# Patient Record
Sex: MALE | Race: WHITE | Employment: OTHER | URBAN - METROPOLITAN AREA
[De-identification: names, ages, dates, MRNs, and addresses within clinical notes are randomized per-mention and may not be internally consistent; named-entity substitution may affect disease eponyms.]

---

## 2017-01-11 ENCOUNTER — OFFICE VISIT (OUTPATIENT)
Dept: CARDIOLOGY CLINIC | Age: 62
End: 2017-01-11

## 2017-01-11 VITALS
OXYGEN SATURATION: 96 % | DIASTOLIC BLOOD PRESSURE: 74 MMHG | BODY MASS INDEX: 35.44 KG/M2 | HEIGHT: 63 IN | WEIGHT: 200 LBS | HEART RATE: 48 BPM | SYSTOLIC BLOOD PRESSURE: 138 MMHG

## 2017-01-11 DIAGNOSIS — R00.1 BRADYCARDIA: ICD-10-CM

## 2017-01-11 DIAGNOSIS — E78.2 MIXED HYPERLIPIDEMIA: ICD-10-CM

## 2017-01-11 DIAGNOSIS — G47.33 OSA (OBSTRUCTIVE SLEEP APNEA): ICD-10-CM

## 2017-01-11 DIAGNOSIS — I10 ESSENTIAL HYPERTENSION: Primary | ICD-10-CM

## 2017-01-11 LAB
T4 FREE: 1.3 NG/DL (ref 0.9–1.8)
TSH REFLEX: 4.69 UIU/ML (ref 0.27–4.2)

## 2017-01-11 PROCEDURE — 93000 ELECTROCARDIOGRAM COMPLETE: CPT | Performed by: INTERNAL MEDICINE

## 2017-01-11 PROCEDURE — 99214 OFFICE O/P EST MOD 30 MIN: CPT | Performed by: INTERNAL MEDICINE

## 2017-03-01 ENCOUNTER — TELEPHONE (OUTPATIENT)
Dept: FAMILY MEDICINE CLINIC | Age: 62
End: 2017-03-01

## 2017-03-01 RX ORDER — CYCLOBENZAPRINE HCL 10 MG
10 TABLET ORAL 3 TIMES DAILY PRN
Qty: 30 TABLET | Refills: 0 | Status: SHIPPED | OUTPATIENT
Start: 2017-03-01 | End: 2017-07-27 | Stop reason: ALTCHOICE

## 2017-03-15 RX ORDER — SIMVASTATIN 40 MG
TABLET ORAL
Qty: 90 TABLET | Refills: 1 | Status: SHIPPED | OUTPATIENT
Start: 2017-03-15 | End: 2017-09-11 | Stop reason: SDUPTHER

## 2017-03-23 ENCOUNTER — OFFICE VISIT (OUTPATIENT)
Dept: FAMILY MEDICINE CLINIC | Age: 62
End: 2017-03-23

## 2017-03-23 VITALS
TEMPERATURE: 97.6 F | BODY MASS INDEX: 35.61 KG/M2 | DIASTOLIC BLOOD PRESSURE: 62 MMHG | HEIGHT: 63 IN | WEIGHT: 201 LBS | HEART RATE: 56 BPM | SYSTOLIC BLOOD PRESSURE: 136 MMHG

## 2017-03-23 DIAGNOSIS — E78.2 MIXED HYPERLIPIDEMIA: ICD-10-CM

## 2017-03-23 DIAGNOSIS — E03.9 ACQUIRED HYPOTHYROIDISM: ICD-10-CM

## 2017-03-23 DIAGNOSIS — R73.02 GLUCOSE INTOLERANCE (IMPAIRED GLUCOSE TOLERANCE): ICD-10-CM

## 2017-03-23 DIAGNOSIS — R73.02 GLUCOSE INTOLERANCE (IMPAIRED GLUCOSE TOLERANCE): Primary | ICD-10-CM

## 2017-03-23 LAB
ALT SERPL-CCNC: 28 U/L (ref 10–40)
ANION GAP SERPL CALCULATED.3IONS-SCNC: 9 MMOL/L (ref 3–16)
AST SERPL-CCNC: 16 U/L (ref 15–37)
BUN BLDV-MCNC: 31 MG/DL (ref 7–20)
CALCIUM SERPL-MCNC: 9 MG/DL (ref 8.3–10.6)
CHLORIDE BLD-SCNC: 106 MMOL/L (ref 99–110)
CHOLESTEROL, TOTAL: 143 MG/DL (ref 0–199)
CO2: 28 MMOL/L (ref 21–32)
CREAT SERPL-MCNC: 1.2 MG/DL (ref 0.8–1.3)
GFR AFRICAN AMERICAN: >60
GFR NON-AFRICAN AMERICAN: >60
GLUCOSE BLD-MCNC: 114 MG/DL (ref 70–99)
HDLC SERPL-MCNC: 49 MG/DL (ref 40–60)
LDL CHOLESTEROL CALCULATED: 78 MG/DL
POTASSIUM SERPL-SCNC: 5 MMOL/L (ref 3.5–5.1)
SODIUM BLD-SCNC: 143 MMOL/L (ref 136–145)
T4 FREE: 1.5 NG/DL (ref 0.9–1.8)
TRIGL SERPL-MCNC: 78 MG/DL (ref 0–150)
TSH SERPL DL<=0.05 MIU/L-ACNC: 1.66 UIU/ML (ref 0.27–4.2)
VLDLC SERPL CALC-MCNC: 16 MG/DL

## 2017-03-23 PROCEDURE — 99213 OFFICE O/P EST LOW 20 MIN: CPT | Performed by: FAMILY MEDICINE

## 2017-03-23 PROCEDURE — 1036F TOBACCO NON-USER: CPT | Performed by: FAMILY MEDICINE

## 2017-03-23 PROCEDURE — G8427 DOCREV CUR MEDS BY ELIG CLIN: HCPCS | Performed by: FAMILY MEDICINE

## 2017-03-23 PROCEDURE — G8417 CALC BMI ABV UP PARAM F/U: HCPCS | Performed by: FAMILY MEDICINE

## 2017-03-23 PROCEDURE — G8484 FLU IMMUNIZE NO ADMIN: HCPCS | Performed by: FAMILY MEDICINE

## 2017-03-23 PROCEDURE — 3017F COLORECTAL CA SCREEN DOC REV: CPT | Performed by: FAMILY MEDICINE

## 2017-03-23 ASSESSMENT — ENCOUNTER SYMPTOMS
NAUSEA: 0
ABDOMINAL DISTENTION: 0
CHEST TIGHTNESS: 0
CONSTIPATION: 0
VOMITING: 0
DIARRHEA: 0
ABDOMINAL PAIN: 0
SHORTNESS OF BREATH: 0
BLOOD IN STOOL: 0

## 2017-03-24 LAB
ESTIMATED AVERAGE GLUCOSE: 131.2 MG/DL
HBA1C MFR BLD: 6.2 %

## 2017-04-20 ENCOUNTER — OFFICE VISIT (OUTPATIENT)
Dept: FAMILY MEDICINE CLINIC | Age: 62
End: 2017-04-20

## 2017-04-20 VITALS
DIASTOLIC BLOOD PRESSURE: 84 MMHG | TEMPERATURE: 98.7 F | SYSTOLIC BLOOD PRESSURE: 138 MMHG | WEIGHT: 203 LBS | HEIGHT: 63 IN | BODY MASS INDEX: 35.97 KG/M2

## 2017-04-20 DIAGNOSIS — J02.9 SORE THROAT: ICD-10-CM

## 2017-04-20 DIAGNOSIS — H02.9 EYELID LESION: Primary | ICD-10-CM

## 2017-04-20 PROCEDURE — G8417 CALC BMI ABV UP PARAM F/U: HCPCS | Performed by: FAMILY MEDICINE

## 2017-04-20 PROCEDURE — 99213 OFFICE O/P EST LOW 20 MIN: CPT | Performed by: FAMILY MEDICINE

## 2017-04-20 PROCEDURE — G8427 DOCREV CUR MEDS BY ELIG CLIN: HCPCS | Performed by: FAMILY MEDICINE

## 2017-04-20 PROCEDURE — 3017F COLORECTAL CA SCREEN DOC REV: CPT | Performed by: FAMILY MEDICINE

## 2017-04-20 PROCEDURE — 1036F TOBACCO NON-USER: CPT | Performed by: FAMILY MEDICINE

## 2017-04-21 ENCOUNTER — TELEPHONE (OUTPATIENT)
Dept: FAMILY MEDICINE CLINIC | Age: 62
End: 2017-04-21

## 2017-04-21 RX ORDER — AZITHROMYCIN 250 MG/1
TABLET, FILM COATED ORAL
Qty: 1 PACKET | Refills: 0 | Status: SHIPPED | OUTPATIENT
Start: 2017-04-21 | End: 2017-05-01

## 2017-06-19 RX ORDER — LEVOTHYROXINE SODIUM 175 UG/1
TABLET ORAL
Qty: 90 TABLET | Refills: 1 | Status: SHIPPED | OUTPATIENT
Start: 2017-06-19 | End: 2017-12-16 | Stop reason: SDUPTHER

## 2017-07-12 ENCOUNTER — OFFICE VISIT (OUTPATIENT)
Dept: CARDIOLOGY CLINIC | Age: 62
End: 2017-07-12

## 2017-07-12 VITALS
HEIGHT: 63 IN | OXYGEN SATURATION: 96 % | DIASTOLIC BLOOD PRESSURE: 60 MMHG | WEIGHT: 203.2 LBS | BODY MASS INDEX: 36 KG/M2 | HEART RATE: 46 BPM | SYSTOLIC BLOOD PRESSURE: 128 MMHG

## 2017-07-12 DIAGNOSIS — G47.33 OSA (OBSTRUCTIVE SLEEP APNEA): ICD-10-CM

## 2017-07-12 DIAGNOSIS — R00.1 BRADYCARDIA: Primary | ICD-10-CM

## 2017-07-12 DIAGNOSIS — I10 ESSENTIAL HYPERTENSION: ICD-10-CM

## 2017-07-12 DIAGNOSIS — E78.2 MIXED HYPERLIPIDEMIA: ICD-10-CM

## 2017-07-12 PROCEDURE — 93000 ELECTROCARDIOGRAM COMPLETE: CPT | Performed by: INTERNAL MEDICINE

## 2017-07-12 PROCEDURE — 1036F TOBACCO NON-USER: CPT | Performed by: INTERNAL MEDICINE

## 2017-07-12 PROCEDURE — G8417 CALC BMI ABV UP PARAM F/U: HCPCS | Performed by: INTERNAL MEDICINE

## 2017-07-12 PROCEDURE — 99214 OFFICE O/P EST MOD 30 MIN: CPT | Performed by: INTERNAL MEDICINE

## 2017-07-12 PROCEDURE — G8427 DOCREV CUR MEDS BY ELIG CLIN: HCPCS | Performed by: INTERNAL MEDICINE

## 2017-07-12 PROCEDURE — 3017F COLORECTAL CA SCREEN DOC REV: CPT | Performed by: INTERNAL MEDICINE

## 2017-07-27 ENCOUNTER — OFFICE VISIT (OUTPATIENT)
Dept: FAMILY MEDICINE CLINIC | Age: 62
End: 2017-07-27

## 2017-07-27 VITALS
HEART RATE: 52 BPM | WEIGHT: 198.8 LBS | SYSTOLIC BLOOD PRESSURE: 122 MMHG | HEIGHT: 63 IN | DIASTOLIC BLOOD PRESSURE: 72 MMHG | BODY MASS INDEX: 35.22 KG/M2

## 2017-07-27 DIAGNOSIS — R73.02 GLUCOSE INTOLERANCE (IMPAIRED GLUCOSE TOLERANCE): ICD-10-CM

## 2017-07-27 DIAGNOSIS — I10 ESSENTIAL HYPERTENSION: ICD-10-CM

## 2017-07-27 DIAGNOSIS — K21.9 GASTROESOPHAGEAL REFLUX DISEASE, ESOPHAGITIS PRESENCE NOT SPECIFIED: ICD-10-CM

## 2017-07-27 DIAGNOSIS — Z23 NEED FOR TDAP VACCINATION: ICD-10-CM

## 2017-07-27 DIAGNOSIS — I10 ESSENTIAL HYPERTENSION: Primary | ICD-10-CM

## 2017-07-27 DIAGNOSIS — E03.9 ACQUIRED HYPOTHYROIDISM: ICD-10-CM

## 2017-07-27 LAB
ANION GAP SERPL CALCULATED.3IONS-SCNC: 13 MMOL/L (ref 3–16)
BUN BLDV-MCNC: 25 MG/DL (ref 7–20)
CALCIUM SERPL-MCNC: 9.8 MG/DL (ref 8.3–10.6)
CHLORIDE BLD-SCNC: 104 MMOL/L (ref 99–110)
CO2: 28 MMOL/L (ref 21–32)
CREAT SERPL-MCNC: 1.3 MG/DL (ref 0.8–1.3)
GFR AFRICAN AMERICAN: >60
GFR NON-AFRICAN AMERICAN: 56
GLUCOSE BLD-MCNC: 115 MG/DL (ref 70–99)
POTASSIUM SERPL-SCNC: 4.8 MMOL/L (ref 3.5–5.1)
SODIUM BLD-SCNC: 145 MMOL/L (ref 136–145)

## 2017-07-27 PROCEDURE — G8417 CALC BMI ABV UP PARAM F/U: HCPCS | Performed by: FAMILY MEDICINE

## 2017-07-27 PROCEDURE — G8427 DOCREV CUR MEDS BY ELIG CLIN: HCPCS | Performed by: FAMILY MEDICINE

## 2017-07-27 PROCEDURE — 3017F COLORECTAL CA SCREEN DOC REV: CPT | Performed by: FAMILY MEDICINE

## 2017-07-27 PROCEDURE — 99214 OFFICE O/P EST MOD 30 MIN: CPT | Performed by: FAMILY MEDICINE

## 2017-07-27 PROCEDURE — 90715 TDAP VACCINE 7 YRS/> IM: CPT | Performed by: FAMILY MEDICINE

## 2017-07-27 PROCEDURE — 90471 IMMUNIZATION ADMIN: CPT | Performed by: FAMILY MEDICINE

## 2017-07-27 PROCEDURE — 1036F TOBACCO NON-USER: CPT | Performed by: FAMILY MEDICINE

## 2017-07-27 ASSESSMENT — ENCOUNTER SYMPTOMS
ABDOMINAL DISTENTION: 0
CONSTIPATION: 0
NAUSEA: 0
COUGH: 0
ABDOMINAL PAIN: 0
BLOOD IN STOOL: 0
DIARRHEA: 0
VOMITING: 0
CHEST TIGHTNESS: 0
SHORTNESS OF BREATH: 0

## 2017-07-28 LAB
ESTIMATED AVERAGE GLUCOSE: 134.1 MG/DL
HBA1C MFR BLD: 6.3 %

## 2017-08-16 ENCOUNTER — HOSPITAL ENCOUNTER (OUTPATIENT)
Dept: OTHER | Age: 62
Discharge: HOME | End: 2017-08-16
Attending: SURGERY | Admitting: SURGERY

## 2017-08-16 LAB — PROSTATE SPECIFIC ANTIGEN: 1.62 NG/ML (ref 0–4)

## 2017-09-11 RX ORDER — SIMVASTATIN 40 MG
TABLET ORAL
Qty: 90 TABLET | Refills: 1 | Status: SHIPPED | OUTPATIENT
Start: 2017-09-11 | End: 2018-03-10 | Stop reason: SDUPTHER

## 2017-09-29 ENCOUNTER — OFFICE VISIT (OUTPATIENT)
Dept: FAMILY MEDICINE CLINIC | Age: 62
End: 2017-09-29

## 2017-09-29 ENCOUNTER — HOSPITAL ENCOUNTER (OUTPATIENT)
Dept: NON INVASIVE DIAGNOSTICS | Age: 62
Discharge: HOME | End: 2017-09-29
Attending: FAMILY MEDICINE | Admitting: FAMILY MEDICINE

## 2017-09-29 VITALS
SYSTOLIC BLOOD PRESSURE: 126 MMHG | TEMPERATURE: 99.2 F | HEIGHT: 63 IN | BODY MASS INDEX: 35.37 KG/M2 | WEIGHT: 199.6 LBS | DIASTOLIC BLOOD PRESSURE: 82 MMHG

## 2017-09-29 DIAGNOSIS — J02.9 SORE THROAT: ICD-10-CM

## 2017-09-29 DIAGNOSIS — R05.9 COUGH: ICD-10-CM

## 2017-09-29 DIAGNOSIS — J02.9 SORE THROAT: Primary | ICD-10-CM

## 2017-09-29 PROCEDURE — 99213 OFFICE O/P EST LOW 20 MIN: CPT | Performed by: FAMILY MEDICINE

## 2017-09-29 PROCEDURE — G8427 DOCREV CUR MEDS BY ELIG CLIN: HCPCS | Performed by: FAMILY MEDICINE

## 2017-09-29 PROCEDURE — 3017F COLORECTAL CA SCREEN DOC REV: CPT | Performed by: FAMILY MEDICINE

## 2017-09-29 PROCEDURE — 1036F TOBACCO NON-USER: CPT | Performed by: FAMILY MEDICINE

## 2017-09-29 PROCEDURE — G8417 CALC BMI ABV UP PARAM F/U: HCPCS | Performed by: FAMILY MEDICINE

## 2017-09-29 RX ORDER — DOXYCYCLINE HYCLATE 100 MG/1
100 CAPSULE ORAL 2 TIMES DAILY
Qty: 20 CAPSULE | Refills: 0 | Status: SHIPPED | OUTPATIENT
Start: 2017-09-29 | End: 2017-10-09

## 2017-12-07 ENCOUNTER — OFFICE VISIT (OUTPATIENT)
Dept: FAMILY MEDICINE CLINIC | Age: 62
End: 2017-12-07

## 2017-12-07 VITALS
HEART RATE: 72 BPM | TEMPERATURE: 98.2 F | SYSTOLIC BLOOD PRESSURE: 134 MMHG | WEIGHT: 200.2 LBS | BODY MASS INDEX: 35.47 KG/M2 | DIASTOLIC BLOOD PRESSURE: 70 MMHG | HEIGHT: 63 IN

## 2017-12-07 DIAGNOSIS — E78.2 MIXED HYPERLIPIDEMIA: ICD-10-CM

## 2017-12-07 DIAGNOSIS — E03.9 ACQUIRED HYPOTHYROIDISM: ICD-10-CM

## 2017-12-07 DIAGNOSIS — R73.02 GLUCOSE INTOLERANCE (IMPAIRED GLUCOSE TOLERANCE): Primary | ICD-10-CM

## 2017-12-07 DIAGNOSIS — I10 ESSENTIAL HYPERTENSION: ICD-10-CM

## 2017-12-07 PROCEDURE — 3017F COLORECTAL CA SCREEN DOC REV: CPT | Performed by: FAMILY MEDICINE

## 2017-12-07 PROCEDURE — 1036F TOBACCO NON-USER: CPT | Performed by: FAMILY MEDICINE

## 2017-12-07 PROCEDURE — G8484 FLU IMMUNIZE NO ADMIN: HCPCS | Performed by: FAMILY MEDICINE

## 2017-12-07 PROCEDURE — 99214 OFFICE O/P EST MOD 30 MIN: CPT | Performed by: FAMILY MEDICINE

## 2017-12-07 PROCEDURE — G8427 DOCREV CUR MEDS BY ELIG CLIN: HCPCS | Performed by: FAMILY MEDICINE

## 2017-12-07 PROCEDURE — G8417 CALC BMI ABV UP PARAM F/U: HCPCS | Performed by: FAMILY MEDICINE

## 2017-12-07 ASSESSMENT — ENCOUNTER SYMPTOMS
BLOOD IN STOOL: 0
ABDOMINAL PAIN: 0
DIARRHEA: 0
VOMITING: 0
CONSTIPATION: 0
NAUSEA: 0
CHEST TIGHTNESS: 0
COUGH: 0
ABDOMINAL DISTENTION: 0
SHORTNESS OF BREATH: 0

## 2017-12-07 NOTE — PROGRESS NOTES
Constitutional: Negative for appetite change, chills, fever and unexpected weight change. Respiratory: Negative for cough, chest tightness and shortness of breath. Cardiovascular: Negative for chest pain, palpitations and leg swelling. Gastrointestinal: Negative for abdominal distention, abdominal pain, blood in stool, constipation, diarrhea, nausea and vomiting. No gerd no dysphagia   Endocrine: Negative for polydipsia and polyuria. Genitourinary: Negative for difficulty urinating and hematuria. Neurological: Negative for dizziness and headaches. Objective:   Physical Exam   Constitutional: He appears well-developed and well-nourished. No distress. HENT:   Head: Normocephalic and atraumatic. Mouth/Throat: Oropharynx is clear and moist and mucous membranes are normal.   Eyes: No scleral icterus. Neck: Full passive range of motion without pain. Neck supple. No thyroid mass and no thyromegaly present. Cardiovascular: Normal rate, regular rhythm and normal heart sounds. Exam reveals no gallop and no friction rub. No murmur heard. No edema   Pulmonary/Chest: Effort normal and breath sounds normal. No accessory muscle usage. No tachypnea. No respiratory distress. He has no decreased breath sounds. He has no wheezes. He has no rhonchi. He has no rales. Abdominal: Soft. Normal appearance and bowel sounds are normal. He exhibits no distension, no abdominal bruit, no pulsatile midline mass and no mass. There is no hepatosplenomegaly. There is no tenderness. There is no rigidity and no guarding. Lymphadenopathy:     He has no cervical adenopathy. Right: No supraclavicular adenopathy present. Left: No supraclavicular adenopathy present. Neurological: He is alert. He displays no tremor. Skin: Skin is warm, dry and intact. He is not diaphoretic. No cyanosis. No pallor. Nails show no clubbing. Psychiatric: He has a normal mood and affect.  His speech is normal. Assessment:      1. Glucose intolerance (impaired glucose tolerance)  Hemoglobin A1C    Comprehensive Metabolic Panel   2. Acquired hypothyroidism  TSH without Reflex    T4, Free   3. Essential hypertension  Lipid Panel    Comprehensive Metabolic Panel   4.  Mixed hyperlipidemia  Lipid Panel    Comprehensive Metabolic Panel     Stable and doing well      Plan:      Continue to stay with the low fat diet  Call for any problem  See us back in 6 months

## 2017-12-12 LAB
ALBUMIN SERPL-MCNC: 4.3 G/DL (ref 3.5–5.7)
ALP BLD-CCNC: 49 U/L (ref 36–125)
ALT SERPL-CCNC: 25 U/L (ref 7–52)
ANION GAP SERPL CALCULATED.3IONS-SCNC: 6 MMOL/L (ref 3–16)
AST SERPL-CCNC: 16 U/L (ref 13–39)
BILIRUB SERPL-MCNC: 0.6 MG/DL (ref 0–1.5)
BUN BLDV-MCNC: 23 MG/DL (ref 7–25)
CALCIUM SERPL-MCNC: 9.5 MG/DL (ref 8.6–10.3)
CHLORIDE BLD-SCNC: 108 MMOL/L (ref 98–110)
CHOLESTEROL, TOTAL: 149 MG/DL (ref 0–200)
CO2: 28 MMOL/L (ref 21–33)
CREAT SERPL-MCNC: 1.14 MG/DL (ref 0.6–1.3)
GFR, ESTIMATED: 69 SEE NOTE.
GFR, ESTIMATED: 79 SEE NOTE.
GLUCOSE BLD-MCNC: 120 MG/DL (ref 70–100)
HBA1C MFR BLD: 6.4 % (ref 4.8–6.4)
HDLC SERPL-MCNC: 61 MG/DL (ref 60–92)
LDL CHOLESTEROL CALCULATED: 72 MG/DL
OSMOLALITY CALCULATION: 299 MOSM/KG (ref 278–305)
POTASSIUM SERPL-SCNC: 4.4 MMOL/L (ref 3.5–5.3)
SODIUM BLD-SCNC: 142 MMOL/L (ref 133–146)
T4 FREE: 1.02 NG/DL (ref 0.61–1.76)
TOTAL PROTEIN: 6.8 G/DL (ref 6.4–8.9)
TRIGL SERPL-MCNC: 79 MG/DL (ref 10–149)
TSH, 3RD GENERATION: 2.31 UIU/ML (ref 0.34–5.6)

## 2017-12-14 ENCOUNTER — TELEPHONE (OUTPATIENT)
Dept: FAMILY MEDICINE CLINIC | Age: 62
End: 2017-12-14

## 2017-12-18 RX ORDER — LEVOTHYROXINE SODIUM 175 UG/1
TABLET ORAL
Qty: 90 TABLET | Refills: 1 | Status: SHIPPED | OUTPATIENT
Start: 2017-12-18 | End: 2018-04-30 | Stop reason: SDUPTHER

## 2018-01-30 ENCOUNTER — OFFICE VISIT (OUTPATIENT)
Dept: FAMILY MEDICINE CLINIC | Age: 63
End: 2018-01-30

## 2018-01-30 VITALS
DIASTOLIC BLOOD PRESSURE: 84 MMHG | HEIGHT: 63 IN | WEIGHT: 198.8 LBS | BODY MASS INDEX: 35.22 KG/M2 | TEMPERATURE: 98.4 F | SYSTOLIC BLOOD PRESSURE: 124 MMHG

## 2018-01-30 DIAGNOSIS — J06.9 UPPER RESPIRATORY TRACT INFECTION, UNSPECIFIED TYPE: Primary | ICD-10-CM

## 2018-01-30 PROCEDURE — 99213 OFFICE O/P EST LOW 20 MIN: CPT | Performed by: FAMILY MEDICINE

## 2018-01-30 PROCEDURE — 3017F COLORECTAL CA SCREEN DOC REV: CPT | Performed by: FAMILY MEDICINE

## 2018-01-30 PROCEDURE — G8417 CALC BMI ABV UP PARAM F/U: HCPCS | Performed by: FAMILY MEDICINE

## 2018-01-30 PROCEDURE — G8427 DOCREV CUR MEDS BY ELIG CLIN: HCPCS | Performed by: FAMILY MEDICINE

## 2018-01-30 PROCEDURE — G8484 FLU IMMUNIZE NO ADMIN: HCPCS | Performed by: FAMILY MEDICINE

## 2018-01-30 PROCEDURE — 1036F TOBACCO NON-USER: CPT | Performed by: FAMILY MEDICINE

## 2018-01-30 RX ORDER — DOXYCYCLINE HYCLATE 100 MG/1
100 CAPSULE ORAL 2 TIMES DAILY
Qty: 20 CAPSULE | Refills: 0 | Status: SHIPPED | OUTPATIENT
Start: 2018-01-30 | End: 2018-02-09

## 2018-01-30 NOTE — PROGRESS NOTES
Subjective:      Patient ID: Ashley Nunez is a 58 y.o. male. Patient presents with:  Congestion: cough, ears clogged x 2 days    Cough productive  Ears clogged no pain  No fever  No sore throat  No congestion head  No tobacco  Wife also ill     height is 5' 3\" (1.6 m) and weight is 198 lb 12.8 oz (90.2 kg). His oral temperature is 98.4 °F (36.9 °C). His blood pressure is 124/84. Review of Systems    Objective:   Physical Exam   Constitutional: He appears well-developed and well-nourished. No distress. HENT:   Head: Normocephalic and atraumatic. Right Ear: Tympanic membrane and ear canal normal.   Left Ear: Tympanic membrane and ear canal normal.   Nose: Nose normal.   Mouth/Throat: Uvula is midline, oropharynx is clear and moist and mucous membranes are normal. No oral lesions. Neck: Neck supple. Pulmonary/Chest: Effort normal and breath sounds normal. No accessory muscle usage. No tachypnea. No respiratory distress. He has no decreased breath sounds. He has no wheezes. He has no rhonchi. He has no rales. Few ronchi in the posterior chest and went away after a few deep breaths   Lymphadenopathy:        Head (right side): No submental and no submandibular adenopathy present. Head (left side): No submental and no submandibular adenopathy present. He has no cervical adenopathy. Right: No supraclavicular adenopathy present. Left: No supraclavicular adenopathy present. Neurological: He is alert. Skin: Skin is warm, dry and intact. He is not diaphoretic. No pallor. Assessment:      1.  Upper respiratory tract infection, unspecified type             Plan:      Do take the antibiotic  Take fluids  Use robitussin dm for the cough      Orders Placed This Encounter   Medications    doxycycline hyclate (VIBRAMYCIN) 100 MG capsule     Sig: Take 1 capsule by mouth 2 times daily for 10 days     Dispense:  20 capsule     Refill:  0

## 2018-03-12 RX ORDER — SIMVASTATIN 40 MG
TABLET ORAL
Qty: 90 TABLET | Refills: 2 | Status: SHIPPED | OUTPATIENT
Start: 2018-03-12 | End: 2018-03-22 | Stop reason: SDUPTHER

## 2018-03-14 ENCOUNTER — OFFICE VISIT (OUTPATIENT)
Dept: CARDIOLOGY CLINIC | Age: 63
End: 2018-03-14

## 2018-03-14 VITALS
SYSTOLIC BLOOD PRESSURE: 124 MMHG | OXYGEN SATURATION: 97 % | BODY MASS INDEX: 34.73 KG/M2 | WEIGHT: 196 LBS | DIASTOLIC BLOOD PRESSURE: 66 MMHG | HEART RATE: 50 BPM | HEIGHT: 63 IN

## 2018-03-14 DIAGNOSIS — R00.1 BRADYCARDIA: ICD-10-CM

## 2018-03-14 DIAGNOSIS — I10 ESSENTIAL HYPERTENSION: Primary | ICD-10-CM

## 2018-03-14 DIAGNOSIS — E78.2 MIXED HYPERLIPIDEMIA: ICD-10-CM

## 2018-03-14 DIAGNOSIS — G47.33 OSA (OBSTRUCTIVE SLEEP APNEA): ICD-10-CM

## 2018-03-14 PROCEDURE — G8417 CALC BMI ABV UP PARAM F/U: HCPCS | Performed by: INTERNAL MEDICINE

## 2018-03-14 PROCEDURE — G8427 DOCREV CUR MEDS BY ELIG CLIN: HCPCS | Performed by: INTERNAL MEDICINE

## 2018-03-14 PROCEDURE — 93000 ELECTROCARDIOGRAM COMPLETE: CPT | Performed by: INTERNAL MEDICINE

## 2018-03-14 PROCEDURE — G8484 FLU IMMUNIZE NO ADMIN: HCPCS | Performed by: INTERNAL MEDICINE

## 2018-03-14 PROCEDURE — 1036F TOBACCO NON-USER: CPT | Performed by: INTERNAL MEDICINE

## 2018-03-14 PROCEDURE — 99214 OFFICE O/P EST MOD 30 MIN: CPT | Performed by: INTERNAL MEDICINE

## 2018-03-14 PROCEDURE — 3017F COLORECTAL CA SCREEN DOC REV: CPT | Performed by: INTERNAL MEDICINE

## 2018-03-14 NOTE — PATIENT INSTRUCTIONS
Patient Education        Heart-Healthy Diet: Care Instructions  Your Care Instructions    A heart-healthy diet has lots of vegetables, fruits, nuts, beans, and whole grains, and is low in salt. It limits foods that are high in saturated fat, such as meats, cheeses, and fried foods. It may be hard to change your diet, but even small changes can lower your risk of heart attack and heart disease. Follow-up care is a key part of your treatment and safety. Be sure to make and go to all appointments, and call your doctor if you are having problems. It's also a good idea to know your test results and keep a list of the medicines you take. How can you care for yourself at home? Watch your portions  · Learn what a serving is. A \"serving\" and a \"portion\" are not always the same thing. Make sure that you are not eating larger portions than are recommended. For example, a serving of pasta is ½ cup. A serving size of meat is 2 to 3 ounces. A 3-ounce serving is about the size of a deck of cards. Measure serving sizes until you are good at [place]" them. Keep in mind that restaurants often serve portions that are 2 or 3 times the size of one serving. · To keep your energy level up and keep you from feeling hungry, eat often but in smaller portions. · Eat only the number of calories you need to stay at a healthy weight. If you need to lose weight, eat fewer calories than your body burns (through exercise and other physical activity). Eat more fruits and vegetables  · Eat a variety of fruit and vegetables every day. Dark green, deep orange, red, or yellow fruits and vegetables are especially good for you. Examples include spinach, carrots, peaches, and berries. · Keep carrots, celery, and other veggies handy for snacks. Buy fruit that is in season and store it where you can see it so that you will be tempted to eat it. · Cook dishes that have a lot of veggies in them, such as stir-fries and soups.   Limit saturated and

## 2018-03-14 NOTE — LETTER
[facility] HEART [address] [place] [address]  Phone: [phone]  Fax: [phone]    Mi Hogan MD        March 14, 2018     Trish Martinez MD  [address] [place], [place] [id number]    Patient: Tho Storey  MR Number: [id number]  YOB: 1955  Date of Visit: 3/14/2018    Dear Dr. Trish Martinez:            [address]      Cardiology [facility] up    Tho Storey  1955    March 14, 2018    Cc: \"I have no chest pain\"     HPI:  The patient is 58 y.o. male with a past medical history significant for HTN, HLD & sleep apena. He is here for an annual check up for his hypertension. He is feeling good and is working on his home doing remodeling without any symptoms. He denies CP, pressure, tightness, edema, SOB, heart racing, palpitations, lightheaded, dizziness, PND or orthopnea. He walks his dog daily. He also reports that he no longer wears his CPAP.        Past Medical History:   Diagnosis Date    Anxiety     BIPOLAR DISORDER     HYPERCHOLESTERAEMIA     Hypothyroid     Kidney disease     OCD (obsessive compulsive disorder)     PTSD     Screening PSA (prostate specific antigen) 1.3.11    result - 2.91    Sleep apnea      Past Surgical History:   Procedure Laterality Date    COLONOSCOPY  4.27.05    normal, Dr. Donna Goff COLONOSCOPY  5/16/14    normal, rhoades    EXTERNAL AUDITORY CANAL RECONSTRUCTION      left 1980 and 1995    LYMPH NODE BIOPSY  4/27/2010    removed groin, benign, dr Cranford Bernheim  5/16/14    Erica moore, check in one year    UPPER GASTROINTESTINAL ENDOSCOPY  6/1/15    Erica Schrader, check one year     Family History   Problem Relation Age of Onset    Heart Disease Mother     Heart Disease Father     Lung Cancer Sister     Diabetes Maternal Uncle     Diabetes Maternal Grandmother     Arthritis Other      Social History Substance Use Topics    Smoking status: Never Smoker    Smokeless tobacco: Never Used    Alcohol use No       Allergies   Allergen Reactions    Codeine      Current Outpatient Prescriptions   Medication Sig Dispense Refill    simvastatin (ZOCOR) 40 MG tablet TAKE 1 TABLET NIGHTLY 90 tablet 2    levothyroxine (SYNTHROID) 175 MCG tablet TAKE 1 TABLET DAILY 90 tablet 1    pantoprazole (PROTONIX) 20 MG tablet Take 20 mg by mouth daily      ONE TOUCH ULTRA TEST strip TEST ONCE DAILY 30 strip 6    ONETOUCH DELICA LANCETS MISC USE AS DIRECTED 4 each 6    busPIRone (BUSPAR) 10 MG tablet Take 1 tablet by mouth 2 times daily. (Patient taking differently: 20 mg 2 times daily ) 60 tablet 4    desvenlafaxine (PRISTIQ) 50 MG TB24 Take 50 mg by mouth daily.  fluvoxamine (LUVOX) 50 MG tablet Take 50 mg by mouth nightly        No current facility-administered medications for this visit. Review of Systems:  Review of systems is as detailed above and all other systems are normal.     Physical Exam:   /66   Pulse 50   Ht 5' 3\" (1.6 m)   Wt 196 lb (88.9 kg) Comment: without shoes  SpO2 97%   BMI 34.72 kg/m²    Wt Readings from Last 3 Encounters:   03/14/18 196 lb (88.9 kg)   01/30/18 198 lb 12.8 oz (90.2 kg)   12/07/17 200 lb 3.2 oz (90.8 kg)     Constitutional: He is oriented to person, place, and time. He appears well-developed and well-nourished. In no acute distress. Head: Normocephalic and atraumatic. Pupils equal and round. Neck: Neck supple. No JVP or carotid bruit appreciated. No mass and no thyromegaly present. No lymphadenopathy present. Cardiovascular: Bradycardia. Normal heart sounds. Exam reveals no gallop and no friction rub. No murmur heard. Pulmonary/Chest: Effort normal and breath sounds normal. No respiratory distress. He has no wheezes, rhonchi or rales. Abdominal: Soft, non-tender. Bowel sounds are normal. He exhibits no organomegaly, mass or bruit. Extremities: No edema, cyanosis, or clubbing. Pulses are 2+ radial/dorsalis pedis/posterior tibial/carotid bilaterally. Neurological: No gross cranial nerve deficit. Coordination normal.   Skin: Skin is warm and dry. There is no rash or diaphoresis. Psychiatric: He has a normal mood and affect. His speech is normal and behavior is normal.     Lab Review:   FLP:    Lab Results   Component Value Date    TRIG 79 12/12/2017    HDL 61 12/12/2017    HDL 54 01/31/2012    LDLCALC 72 12/12/2017    LABVLDL 16 03/23/2017     BUN/Creatinine:    Lab Results   Component Value Date    BUN 23 12/12/2017    CREATININE 1.14 12/12/2017     EKG Interpretation: 7/12/17, reviewed showed s bardycardia. 3/14/18, Marked sinus  Bradycardia  -First degree A-V block-HR 47       Image Review:     ECHO: 4/2010  LVEF 65%    Assessment/Plan:     Hypertension  BP is stable today. BMP from 12/2017 stable.      Hyperlipidemia  Last lipid profile from 12/2017 was within acceptable limits. Continue Zocor.       Bradycardia  Pt continues to have marked bradycardia on clinical exam. His EKG shows sinus bradycardia with HR 47 He continues to remain asymptomatic. This is very likely due to high vagal tone. I will continue to follow him clinically. TSH from 3/2017 WNL. BRIDGETTE  Pt is no longer using CPAP machine and refuses to wear it. Patient to follow up in 1 year. Thank you very much for allowing me to participate in the care of your patient. Please do not hesitate to contact me if you have any questions. Sincerely,  Sherrell Yee MD      [address] [place] [address]  Ph: [phone]  Fax: [phone]        If you have questions, please do not hesitate to call me. I look forward to following Kathy Ann along with you.     Sincerely,        Sherrell Yee MD

## 2018-03-22 RX ORDER — SIMVASTATIN 40 MG
40 TABLET ORAL NIGHTLY
Qty: 90 TABLET | Refills: 2 | Status: SHIPPED | OUTPATIENT
Start: 2018-03-22 | End: 2019-03-03 | Stop reason: SDUPTHER

## 2018-04-23 ENCOUNTER — OFFICE VISIT (OUTPATIENT)
Dept: FAMILY MEDICINE CLINIC | Age: 63
End: 2018-04-23

## 2018-04-23 VITALS
HEIGHT: 63 IN | WEIGHT: 203 LBS | HEART RATE: 56 BPM | TEMPERATURE: 98.3 F | SYSTOLIC BLOOD PRESSURE: 134 MMHG | BODY MASS INDEX: 35.97 KG/M2 | DIASTOLIC BLOOD PRESSURE: 82 MMHG

## 2018-04-23 DIAGNOSIS — I10 ESSENTIAL HYPERTENSION: Primary | ICD-10-CM

## 2018-04-23 DIAGNOSIS — R73.02 GLUCOSE INTOLERANCE (IMPAIRED GLUCOSE TOLERANCE): ICD-10-CM

## 2018-04-23 DIAGNOSIS — K21.9 GASTROESOPHAGEAL REFLUX DISEASE, ESOPHAGITIS PRESENCE NOT SPECIFIED: ICD-10-CM

## 2018-04-23 DIAGNOSIS — I10 ESSENTIAL HYPERTENSION: ICD-10-CM

## 2018-04-23 LAB
ANION GAP SERPL CALCULATED.3IONS-SCNC: 14 MMOL/L (ref 3–16)
BILIRUBIN URINE: NEGATIVE
BLOOD, URINE: NEGATIVE
BUN BLDV-MCNC: 22 MG/DL (ref 7–20)
CALCIUM SERPL-MCNC: 9.6 MG/DL (ref 8.3–10.6)
CHLORIDE BLD-SCNC: 104 MMOL/L (ref 99–110)
CLARITY: CLEAR
CO2: 29 MMOL/L (ref 21–32)
COLOR: YELLOW
CREAT SERPL-MCNC: 1.2 MG/DL (ref 0.8–1.3)
EPITHELIAL CELLS, UA: 0 /HPF (ref 0–5)
GFR AFRICAN AMERICAN: >60
GFR NON-AFRICAN AMERICAN: >60
GLUCOSE BLD-MCNC: 113 MG/DL (ref 70–99)
GLUCOSE URINE: NEGATIVE MG/DL
HYALINE CASTS: 1 /LPF (ref 0–8)
KETONES, URINE: NEGATIVE MG/DL
LEUKOCYTE ESTERASE, URINE: NEGATIVE
MICROSCOPIC EXAMINATION: YES
NITRITE, URINE: NEGATIVE
PH UA: 6
POTASSIUM SERPL-SCNC: 4.6 MMOL/L (ref 3.5–5.1)
PROTEIN UA: ABNORMAL MG/DL
RBC UA: 2 /HPF (ref 0–4)
SODIUM BLD-SCNC: 147 MMOL/L (ref 136–145)
SPECIFIC GRAVITY UA: 1.02
UROBILINOGEN, URINE: 0.2 E.U./DL
WBC UA: 3 /HPF (ref 0–5)

## 2018-04-23 PROCEDURE — 1036F TOBACCO NON-USER: CPT | Performed by: FAMILY MEDICINE

## 2018-04-23 PROCEDURE — G8417 CALC BMI ABV UP PARAM F/U: HCPCS | Performed by: FAMILY MEDICINE

## 2018-04-23 PROCEDURE — 99213 OFFICE O/P EST LOW 20 MIN: CPT | Performed by: FAMILY MEDICINE

## 2018-04-23 PROCEDURE — G8427 DOCREV CUR MEDS BY ELIG CLIN: HCPCS | Performed by: FAMILY MEDICINE

## 2018-04-23 PROCEDURE — 3017F COLORECTAL CA SCREEN DOC REV: CPT | Performed by: FAMILY MEDICINE

## 2018-04-23 ASSESSMENT — ENCOUNTER SYMPTOMS
NAUSEA: 0
CHEST TIGHTNESS: 0
SHORTNESS OF BREATH: 0
BLOOD IN STOOL: 0
ABDOMINAL PAIN: 0
CONSTIPATION: 0
DIARRHEA: 0
ABDOMINAL DISTENTION: 0
VOMITING: 0

## 2018-04-23 ASSESSMENT — PATIENT HEALTH QUESTIONNAIRE - PHQ9
SUM OF ALL RESPONSES TO PHQ9 QUESTIONS 1 & 2: 0
1. LITTLE INTEREST OR PLEASURE IN DOING THINGS: 0
SUM OF ALL RESPONSES TO PHQ QUESTIONS 1-9: 0
2. FEELING DOWN, DEPRESSED OR HOPELESS: 0

## 2018-04-24 ENCOUNTER — TELEPHONE (OUTPATIENT)
Dept: FAMILY MEDICINE CLINIC | Age: 63
End: 2018-04-24

## 2018-04-24 LAB
ESTIMATED AVERAGE GLUCOSE: 134.1 MG/DL
HBA1C MFR BLD: 6.3 %

## 2018-04-30 RX ORDER — LEVOTHYROXINE SODIUM 175 UG/1
TABLET ORAL
Qty: 90 TABLET | Refills: 1 | Status: SHIPPED | OUTPATIENT
Start: 2018-04-30 | End: 2018-12-06 | Stop reason: SDUPTHER

## 2018-08-09 ENCOUNTER — OFFICE VISIT (OUTPATIENT)
Dept: FAMILY MEDICINE CLINIC | Age: 63
End: 2018-08-09

## 2018-08-09 VITALS
DIASTOLIC BLOOD PRESSURE: 78 MMHG | WEIGHT: 203.4 LBS | HEART RATE: 68 BPM | BODY MASS INDEX: 36.04 KG/M2 | TEMPERATURE: 97.8 F | HEIGHT: 63 IN | SYSTOLIC BLOOD PRESSURE: 128 MMHG

## 2018-08-09 DIAGNOSIS — R73.02 GLUCOSE INTOLERANCE (IMPAIRED GLUCOSE TOLERANCE): ICD-10-CM

## 2018-08-09 DIAGNOSIS — E03.9 ACQUIRED HYPOTHYROIDISM: ICD-10-CM

## 2018-08-09 DIAGNOSIS — I10 ESSENTIAL HYPERTENSION: Primary | ICD-10-CM

## 2018-08-09 DIAGNOSIS — I10 ESSENTIAL HYPERTENSION: ICD-10-CM

## 2018-08-09 LAB
A/G RATIO: 1.9 (ref 1.1–2.2)
ALBUMIN SERPL-MCNC: 4.5 G/DL (ref 3.4–5)
ALP BLD-CCNC: 53 U/L (ref 40–129)
ALT SERPL-CCNC: 25 U/L (ref 10–40)
ANION GAP SERPL CALCULATED.3IONS-SCNC: 13 MMOL/L (ref 3–16)
AST SERPL-CCNC: 18 U/L (ref 15–37)
BILIRUB SERPL-MCNC: 0.6 MG/DL (ref 0–1)
BUN BLDV-MCNC: 22 MG/DL (ref 7–20)
CALCIUM SERPL-MCNC: 9.3 MG/DL (ref 8.3–10.6)
CHLORIDE BLD-SCNC: 103 MMOL/L (ref 99–110)
CHOLESTEROL, TOTAL: 147 MG/DL (ref 0–199)
CO2: 27 MMOL/L (ref 21–32)
CREAT SERPL-MCNC: 1.3 MG/DL (ref 0.8–1.3)
GFR AFRICAN AMERICAN: >60
GFR NON-AFRICAN AMERICAN: 56
GLOBULIN: 2.4 G/DL
GLUCOSE BLD-MCNC: 113 MG/DL (ref 70–99)
HDLC SERPL-MCNC: 59 MG/DL (ref 40–60)
LDL CHOLESTEROL CALCULATED: 71 MG/DL
POTASSIUM SERPL-SCNC: 4.5 MMOL/L (ref 3.5–5.1)
SODIUM BLD-SCNC: 143 MMOL/L (ref 136–145)
T4 FREE: 1.7 NG/DL (ref 0.9–1.8)
TOTAL PROTEIN: 6.9 G/DL (ref 6.4–8.2)
TRIGL SERPL-MCNC: 84 MG/DL (ref 0–150)
TSH SERPL DL<=0.05 MIU/L-ACNC: 3.99 UIU/ML (ref 0.27–4.2)
VLDLC SERPL CALC-MCNC: 17 MG/DL

## 2018-08-09 PROCEDURE — 1036F TOBACCO NON-USER: CPT | Performed by: FAMILY MEDICINE

## 2018-08-09 PROCEDURE — 99213 OFFICE O/P EST LOW 20 MIN: CPT | Performed by: FAMILY MEDICINE

## 2018-08-09 PROCEDURE — G8417 CALC BMI ABV UP PARAM F/U: HCPCS | Performed by: FAMILY MEDICINE

## 2018-08-09 PROCEDURE — G8427 DOCREV CUR MEDS BY ELIG CLIN: HCPCS | Performed by: FAMILY MEDICINE

## 2018-08-09 PROCEDURE — 3017F COLORECTAL CA SCREEN DOC REV: CPT | Performed by: FAMILY MEDICINE

## 2018-08-10 LAB
ESTIMATED AVERAGE GLUCOSE: 139.9 MG/DL
HBA1C MFR BLD: 6.5 %

## 2018-09-27 ENCOUNTER — HOSPITAL ENCOUNTER (OUTPATIENT)
Age: 63
Discharge: HOME | End: 2018-09-27
Payer: MEDICARE

## 2018-09-27 LAB
ANION GAP SERPL CALCULATED.3IONS-SCNC: 12 MMOL/L (ref 3–16)
BUN BLDV-MCNC: 27 MG/DL (ref 7–20)
CALCIUM SERPL-MCNC: 9.5 MG/DL (ref 8.3–10.6)
CHLORIDE BLD-SCNC: 103 MMOL/L (ref 99–110)
CO2: 28 MMOL/L (ref 21–32)
CREAT SERPL-MCNC: 1.5 MG/DL (ref 0.8–1.3)
GFR AFRICAN AMERICAN: 57
GFR NON-AFRICAN AMERICAN: 47
GLUCOSE BLD-MCNC: 113 MG/DL (ref 70–99)
POTASSIUM SERPL-SCNC: 5 MMOL/L (ref 3.5–5.1)
SODIUM BLD-SCNC: 143 MMOL/L (ref 136–145)

## 2018-09-27 PROCEDURE — 84156 ASSAY OF PROTEIN URINE: CPT

## 2018-09-27 PROCEDURE — 82570 ASSAY OF URINE CREATININE: CPT

## 2018-09-27 PROCEDURE — 80048 BASIC METABOLIC PNL TOTAL CA: CPT

## 2018-09-27 PROCEDURE — 36415 COLL VENOUS BLD VENIPUNCTURE: CPT

## 2018-09-28 LAB
CREATININE URINE: 161.7 MG/DL (ref 39–259)
PROTEIN PROTEIN: 8 MG/DL
PROTEIN/CREAT RATIO: 0 MG/DL

## 2018-12-07 RX ORDER — LEVOTHYROXINE SODIUM 175 UG/1
TABLET ORAL
Qty: 90 TABLET | Refills: 2 | Status: SHIPPED | OUTPATIENT
Start: 2018-12-07 | End: 2019-09-29 | Stop reason: SDUPTHER

## 2019-01-18 ENCOUNTER — OFFICE VISIT (OUTPATIENT)
Dept: FAMILY MEDICINE CLINIC | Age: 64
End: 2019-01-18
Payer: MEDICARE

## 2019-01-18 VITALS
WEIGHT: 203 LBS | TEMPERATURE: 97.7 F | SYSTOLIC BLOOD PRESSURE: 136 MMHG | HEIGHT: 63 IN | DIASTOLIC BLOOD PRESSURE: 80 MMHG | BODY MASS INDEX: 35.97 KG/M2 | HEART RATE: 56 BPM

## 2019-01-18 DIAGNOSIS — E03.9 ACQUIRED HYPOTHYROIDISM: ICD-10-CM

## 2019-01-18 DIAGNOSIS — J06.9 UPPER RESPIRATORY TRACT INFECTION, UNSPECIFIED TYPE: Primary | ICD-10-CM

## 2019-01-18 DIAGNOSIS — E11.9 TYPE 2 DIABETES MELLITUS WITHOUT COMPLICATION, WITHOUT LONG-TERM CURRENT USE OF INSULIN (HCC): ICD-10-CM

## 2019-01-18 LAB
A/G RATIO: 1.2 (ref 1.1–2.2)
ALBUMIN SERPL-MCNC: 4.1 G/DL (ref 3.4–5)
ALP BLD-CCNC: 64 U/L (ref 40–129)
ALT SERPL-CCNC: 24 U/L (ref 10–40)
ANION GAP SERPL CALCULATED.3IONS-SCNC: 12 MMOL/L (ref 3–16)
AST SERPL-CCNC: 19 U/L (ref 15–37)
BILIRUB SERPL-MCNC: 0.4 MG/DL (ref 0–1)
BUN BLDV-MCNC: 25 MG/DL (ref 7–20)
CALCIUM SERPL-MCNC: 9.5 MG/DL (ref 8.3–10.6)
CHLORIDE BLD-SCNC: 107 MMOL/L (ref 99–110)
CO2: 24 MMOL/L (ref 21–32)
CREAT SERPL-MCNC: 1.2 MG/DL (ref 0.8–1.3)
GFR AFRICAN AMERICAN: >60
GFR NON-AFRICAN AMERICAN: >60
GLOBULIN: 3.3 G/DL
GLUCOSE BLD-MCNC: 133 MG/DL (ref 70–99)
POTASSIUM SERPL-SCNC: 4.9 MMOL/L (ref 3.5–5.1)
SODIUM BLD-SCNC: 143 MMOL/L (ref 136–145)
T4 FREE: 1.6 NG/DL (ref 0.9–1.8)
TOTAL PROTEIN: 7.4 G/DL (ref 6.4–8.2)
TSH SERPL DL<=0.05 MIU/L-ACNC: 0.72 UIU/ML (ref 0.27–4.2)

## 2019-01-18 PROCEDURE — 3017F COLORECTAL CA SCREEN DOC REV: CPT | Performed by: FAMILY MEDICINE

## 2019-01-18 PROCEDURE — 1036F TOBACCO NON-USER: CPT | Performed by: FAMILY MEDICINE

## 2019-01-18 PROCEDURE — 3046F HEMOGLOBIN A1C LEVEL >9.0%: CPT | Performed by: FAMILY MEDICINE

## 2019-01-18 PROCEDURE — G8427 DOCREV CUR MEDS BY ELIG CLIN: HCPCS | Performed by: FAMILY MEDICINE

## 2019-01-18 PROCEDURE — G8484 FLU IMMUNIZE NO ADMIN: HCPCS | Performed by: FAMILY MEDICINE

## 2019-01-18 PROCEDURE — 99213 OFFICE O/P EST LOW 20 MIN: CPT | Performed by: FAMILY MEDICINE

## 2019-01-18 PROCEDURE — G8417 CALC BMI ABV UP PARAM F/U: HCPCS | Performed by: FAMILY MEDICINE

## 2019-01-18 PROCEDURE — 2022F DILAT RTA XM EVC RTNOPTHY: CPT | Performed by: FAMILY MEDICINE

## 2019-01-18 RX ORDER — FLUVOXAMINE MALEATE 100 MG
TABLET ORAL NIGHTLY
COMMUNITY
Start: 2018-11-22

## 2019-01-18 RX ORDER — CEFUROXIME AXETIL 250 MG/1
250 TABLET ORAL 2 TIMES DAILY
Qty: 20 TABLET | Refills: 0 | Status: SHIPPED | OUTPATIENT
Start: 2019-01-18 | End: 2019-01-28

## 2019-01-18 ASSESSMENT — ENCOUNTER SYMPTOMS
CONSTIPATION: 0
VOMITING: 0
ABDOMINAL DISTENTION: 0
DIARRHEA: 0
SHORTNESS OF BREATH: 0
CHEST TIGHTNESS: 0
ABDOMINAL PAIN: 0
NAUSEA: 0
BLOOD IN STOOL: 0

## 2019-01-19 LAB
ESTIMATED AVERAGE GLUCOSE: 139.9 MG/DL
HBA1C MFR BLD: 6.5 %

## 2019-02-07 ENCOUNTER — OFFICE VISIT (OUTPATIENT)
Dept: FAMILY MEDICINE CLINIC | Age: 64
End: 2019-02-07
Payer: MEDICARE

## 2019-02-07 VITALS
TEMPERATURE: 98.3 F | HEIGHT: 63 IN | HEART RATE: 52 BPM | SYSTOLIC BLOOD PRESSURE: 122 MMHG | WEIGHT: 202 LBS | DIASTOLIC BLOOD PRESSURE: 80 MMHG | BODY MASS INDEX: 35.79 KG/M2

## 2019-02-07 DIAGNOSIS — E11.9 TYPE 2 DIABETES MELLITUS WITHOUT COMPLICATION, WITHOUT LONG-TERM CURRENT USE OF INSULIN (HCC): ICD-10-CM

## 2019-02-07 DIAGNOSIS — K21.9 GASTROESOPHAGEAL REFLUX DISEASE, ESOPHAGITIS PRESENCE NOT SPECIFIED: ICD-10-CM

## 2019-02-07 DIAGNOSIS — E03.9 ACQUIRED HYPOTHYROIDISM: Primary | ICD-10-CM

## 2019-02-07 PROCEDURE — 1036F TOBACCO NON-USER: CPT | Performed by: FAMILY MEDICINE

## 2019-02-07 PROCEDURE — G8484 FLU IMMUNIZE NO ADMIN: HCPCS | Performed by: FAMILY MEDICINE

## 2019-02-07 PROCEDURE — 3017F COLORECTAL CA SCREEN DOC REV: CPT | Performed by: FAMILY MEDICINE

## 2019-02-07 PROCEDURE — 2022F DILAT RTA XM EVC RTNOPTHY: CPT | Performed by: FAMILY MEDICINE

## 2019-02-07 PROCEDURE — G8427 DOCREV CUR MEDS BY ELIG CLIN: HCPCS | Performed by: FAMILY MEDICINE

## 2019-02-07 PROCEDURE — G8417 CALC BMI ABV UP PARAM F/U: HCPCS | Performed by: FAMILY MEDICINE

## 2019-02-07 PROCEDURE — 3044F HG A1C LEVEL LT 7.0%: CPT | Performed by: FAMILY MEDICINE

## 2019-02-07 PROCEDURE — 99214 OFFICE O/P EST MOD 30 MIN: CPT | Performed by: FAMILY MEDICINE

## 2019-02-07 ASSESSMENT — ENCOUNTER SYMPTOMS
ABDOMINAL PAIN: 0
DIARRHEA: 0
CHEST TIGHTNESS: 0
CONSTIPATION: 0
VOMITING: 0
SHORTNESS OF BREATH: 0
NAUSEA: 0
ABDOMINAL DISTENTION: 0
BLOOD IN STOOL: 0

## 2019-03-04 RX ORDER — SIMVASTATIN 40 MG
40 TABLET ORAL NIGHTLY
Qty: 90 TABLET | Refills: 2 | Status: SHIPPED | OUTPATIENT
Start: 2019-03-04 | End: 2019-09-29 | Stop reason: SDUPTHER

## 2019-05-24 NOTE — PROGRESS NOTES
Was here with wife and noted a few weeks of irritated area on the abdomen. Though it was bug bite but not healing  No fever  Picks at it  Small barely felt red papule  About 8 mm   No foreign body  No d/c  He will use the bactroban   caLL IF NO BETTER   he is seeing dr Sweeney No soon and will check then    Orders Placed This Encounter   Medications    mupirocin (BACTROBAN) 2 % ointment     Sig: Apply topically 3 times daily for 5 days Apply 3 times daily.      Dispense:  1 Tube     Refill:  0

## 2019-06-06 ENCOUNTER — OFFICE VISIT (OUTPATIENT)
Dept: FAMILY MEDICINE CLINIC | Age: 64
End: 2019-06-06
Payer: MEDICARE

## 2019-06-06 VITALS
SYSTOLIC BLOOD PRESSURE: 134 MMHG | WEIGHT: 195 LBS | TEMPERATURE: 97.8 F | HEIGHT: 63 IN | HEART RATE: 52 BPM | BODY MASS INDEX: 34.55 KG/M2 | DIASTOLIC BLOOD PRESSURE: 70 MMHG

## 2019-06-06 DIAGNOSIS — I10 ESSENTIAL HYPERTENSION: ICD-10-CM

## 2019-06-06 DIAGNOSIS — H72.92 PERFORATION OF LEFT TYMPANIC MEMBRANE: ICD-10-CM

## 2019-06-06 DIAGNOSIS — E11.9 TYPE 2 DIABETES MELLITUS WITHOUT COMPLICATION, WITHOUT LONG-TERM CURRENT USE OF INSULIN (HCC): ICD-10-CM

## 2019-06-06 DIAGNOSIS — E11.9 TYPE 2 DIABETES MELLITUS WITHOUT COMPLICATION, WITHOUT LONG-TERM CURRENT USE OF INSULIN (HCC): Primary | ICD-10-CM

## 2019-06-06 LAB
A/G RATIO: 1.2 (ref 1.1–2.2)
ALBUMIN SERPL-MCNC: 4.2 G/DL (ref 3.4–5)
ALP BLD-CCNC: 70 U/L (ref 40–129)
ALT SERPL-CCNC: 37 U/L (ref 10–40)
ANION GAP SERPL CALCULATED.3IONS-SCNC: 13 MMOL/L (ref 3–16)
AST SERPL-CCNC: 22 U/L (ref 15–37)
BILIRUB SERPL-MCNC: 0.3 MG/DL (ref 0–1)
BUN BLDV-MCNC: 31 MG/DL (ref 7–20)
CALCIUM SERPL-MCNC: 10.1 MG/DL (ref 8.3–10.6)
CHLORIDE BLD-SCNC: 105 MMOL/L (ref 99–110)
CHOLESTEROL, TOTAL: 163 MG/DL (ref 0–199)
CO2: 25 MMOL/L (ref 21–32)
CREAT SERPL-MCNC: 1.3 MG/DL (ref 0.8–1.3)
GFR AFRICAN AMERICAN: >60
GFR NON-AFRICAN AMERICAN: 56
GLOBULIN: 3.4 G/DL
GLUCOSE BLD-MCNC: 108 MG/DL (ref 70–99)
HDLC SERPL-MCNC: 59 MG/DL (ref 40–60)
LDL CHOLESTEROL CALCULATED: 85 MG/DL
POTASSIUM SERPL-SCNC: 5.6 MMOL/L (ref 3.5–5.1)
SODIUM BLD-SCNC: 143 MMOL/L (ref 136–145)
TOTAL PROTEIN: 7.6 G/DL (ref 6.4–8.2)
TRIGL SERPL-MCNC: 95 MG/DL (ref 0–150)
VLDLC SERPL CALC-MCNC: 19 MG/DL

## 2019-06-06 PROCEDURE — 2022F DILAT RTA XM EVC RTNOPTHY: CPT | Performed by: FAMILY MEDICINE

## 2019-06-06 PROCEDURE — G8417 CALC BMI ABV UP PARAM F/U: HCPCS | Performed by: FAMILY MEDICINE

## 2019-06-06 PROCEDURE — 1036F TOBACCO NON-USER: CPT | Performed by: FAMILY MEDICINE

## 2019-06-06 PROCEDURE — 3017F COLORECTAL CA SCREEN DOC REV: CPT | Performed by: FAMILY MEDICINE

## 2019-06-06 PROCEDURE — 3044F HG A1C LEVEL LT 7.0%: CPT | Performed by: FAMILY MEDICINE

## 2019-06-06 PROCEDURE — 99213 OFFICE O/P EST LOW 20 MIN: CPT | Performed by: FAMILY MEDICINE

## 2019-06-06 PROCEDURE — G8427 DOCREV CUR MEDS BY ELIG CLIN: HCPCS | Performed by: FAMILY MEDICINE

## 2019-06-06 ASSESSMENT — ENCOUNTER SYMPTOMS
NAUSEA: 0
BLOOD IN STOOL: 0
DIARRHEA: 0
ABDOMINAL PAIN: 0
CHEST TIGHTNESS: 0
CONSTIPATION: 0
VOMITING: 0
SHORTNESS OF BREATH: 0
ABDOMINAL DISTENTION: 0

## 2019-06-06 NOTE — PROGRESS NOTES
Subjective:      Patient ID: Lukasz Berger is a 59 y.o. male. Patient presents with: Follow-up: thyroid, hypertension, lipids     He is doing well and no c/o  Last few times he has checked the glucose he was at about 111    He is trying to watch the diet carefully. He is to start the cpap  Feet ok  eye check in October    YOB: 1955    Date of Visit:  6/6/2019     -- Codeine     Current Outpatient Medications:  simvastatin (ZOCOR) 40 MG tablet, TAKE 1 TABLET BY MOUTH  NIGHTLY, Disp: 90 tablet, Rfl: 2  fluvoxaMINE (LUVOX) 100 MG tablet, , Disp: , Rfl:   levothyroxine (SYNTHROID) 175 MCG tablet, TAKE 1 TABLET DAILY, Disp: 90 tablet, Rfl: 2  pantoprazole (PROTONIX) 20 MG tablet, Take 20 mg by mouth daily, Disp: , Rfl:   ONE TOUCH ULTRA TEST strip, TEST ONCE DAILY, Disp: 30 strip, Rfl: 6  ONETOUCH DELICA LANCETS MISC, USE AS DIRECTED, Disp: 4 each, Rfl: 6  busPIRone (BUSPAR) 10 MG tablet, Take 1 tablet by mouth 2 times daily. (Patient taking differently: 20 mg 2 times daily ), Disp: 60 tablet, Rfl: 4  desvenlafaxine (PRISTIQ) 50 MG TB24, Take 50 mg by mouth daily. , Disp: , Rfl:     No current facility-administered medications for this visit.       ---------------------------               06/06/19                      1355         ---------------------------   BP:          134/70         Site:    Left Upper Arm     Position:     Sitting        Cuff Size:   Large Adult      Pulse:         52           Temp:   97.8 °F (36.6 °C)   TempSrc:      Oral          Weight: 195 lb (88.5 kg)    Height:   5' 3\" (1.6 m)    ---------------------------  Body mass index is 34.54 kg/m².      Wt Readings from Last 3 Encounters:  06/06/19 : 195 lb (88.5 kg)  02/07/19 : 202 lb (91.6 kg)  01/18/19 : 203 lb (92.1 kg)    BP Readings from Last 3 Encounters:  06/06/19 : 134/70  02/07/19 : 122/80  01/18/19 : 136/80                    Review of Systems   Constitutional: Negative for

## 2019-06-07 DIAGNOSIS — E87.5 SERUM POTASSIUM ELEVATED: ICD-10-CM

## 2019-06-07 DIAGNOSIS — R79.9 ABNORMAL BLOOD CHEMISTRY: Primary | ICD-10-CM

## 2019-06-07 LAB
ESTIMATED AVERAGE GLUCOSE: 139.9 MG/DL
HBA1C MFR BLD: 6.5 %

## 2019-06-10 ENCOUNTER — OFFICE VISIT (OUTPATIENT)
Dept: ENT CLINIC | Age: 64
End: 2019-06-10
Payer: MEDICARE

## 2019-06-10 VITALS
WEIGHT: 196.4 LBS | HEART RATE: 40 BPM | BODY MASS INDEX: 34.8 KG/M2 | TEMPERATURE: 98.8 F | SYSTOLIC BLOOD PRESSURE: 108 MMHG | HEIGHT: 63 IN | DIASTOLIC BLOOD PRESSURE: 66 MMHG

## 2019-06-10 DIAGNOSIS — H74.02 TYMPANOSCLEROSIS INVOLVING TYMPANIC MEMBRANE ONLY, LEFT: Primary | ICD-10-CM

## 2019-06-10 DIAGNOSIS — H72.92 MONOMERIC TYMPANIC MEMBRANE OF LEFT EAR: ICD-10-CM

## 2019-06-10 PROCEDURE — 3017F COLORECTAL CA SCREEN DOC REV: CPT | Performed by: OTOLARYNGOLOGY

## 2019-06-10 PROCEDURE — 1036F TOBACCO NON-USER: CPT | Performed by: OTOLARYNGOLOGY

## 2019-06-10 PROCEDURE — G8427 DOCREV CUR MEDS BY ELIG CLIN: HCPCS | Performed by: OTOLARYNGOLOGY

## 2019-06-10 PROCEDURE — G8417 CALC BMI ABV UP PARAM F/U: HCPCS | Performed by: OTOLARYNGOLOGY

## 2019-06-10 PROCEDURE — 99203 OFFICE O/P NEW LOW 30 MIN: CPT | Performed by: OTOLARYNGOLOGY

## 2019-06-10 NOTE — PROGRESS NOTES
CHIEF COMPLAINT: Hearing loss left ear    HISTORY OF PRESENT ILLNESS:  59 y.o. male who presents with hearing loss left ear. Long standing. . No otorrhea. No otalgia. Has history of noise exposure at work but wore ear protection. Has had 4 procedures on his left middle ear. Has concern about a perforation of the left tympanic membrane. PAST MEDICAL HISTORY:   Social History     Tobacco Use   Smoking Status Never Smoker   Smokeless Tobacco Never Used                                                    Social History     Substance and Sexual Activity   Alcohol Use No    Alcohol/week: 0.0 oz                                                    Current Outpatient Medications:     simvastatin (ZOCOR) 40 MG tablet, TAKE 1 TABLET BY MOUTH  NIGHTLY, Disp: 90 tablet, Rfl: 2    fluvoxaMINE (LUVOX) 100 MG tablet, , Disp: , Rfl:     levothyroxine (SYNTHROID) 175 MCG tablet, TAKE 1 TABLET DAILY, Disp: 90 tablet, Rfl: 2    pantoprazole (PROTONIX) 20 MG tablet, Take 20 mg by mouth daily, Disp: , Rfl:     ONE TOUCH ULTRA TEST strip, TEST ONCE DAILY, Disp: 30 strip, Rfl: 6    ONETOUCH DELICA LANCETS MISC, USE AS DIRECTED, Disp: 4 each, Rfl: 6    busPIRone (BUSPAR) 10 MG tablet, Take 1 tablet by mouth 2 times daily. (Patient taking differently: 20 mg 2 times daily ), Disp: 60 tablet, Rfl: 4    desvenlafaxine (PRISTIQ) 50 MG TB24, Take 50 mg by mouth daily.   , Disp: , Rfl:                                                  Past Medical History:   Diagnosis Date    Allergic rhinitis     Anxiety     Arthritis     Bipolar Disorder     Diabetes mellitus ([address]     GERD (gastroesophageal reflux disease)     Hearing loss     HYPERCHOLESTERAEMIA     Hypertension     Hypothyroid     Kidney disease     OCD (obsessive compulsive disorder)     PTSD     Screening PSA (prostate specific antigen) 1.3.11    result - 2.91    Sleep apnea                                                     Past Surgical History:   Procedure Laterality Date    COLONOSCOPY  4.27.05    normal, Dr. Emeka Phoenix COLONOSCOPY  5/16/14    normal, Yumiko Franklin EXTERNAL AUDITORY CANAL RECONSTRUCTION      left 1980 and 1995    LYMPH NODE BIOPSY  4/27/2010    removed groin, benign, dr Zenaida Cordero  5/16/14    Jose moore, check in one year    UPPER GASTROINTESTINAL ENDOSCOPY  6/1/15    rhoades, check one year         REVIEW OF SYSTEMS:  All pertinent positive and negative review of systems included in HPI. Otherwise, all systems are reviewed and negative. PHYSICAL EXAMINATION:   GENERAL: wdwn- no acute distress  COMMUNICATION :  Normal voice  MENTAL STATUS:  Normal  HEAD AND FACE:  Normal  EXTERNAL EARS AND NOSE:  Normal  FACIAL MUSCLES:  Normal  EXTRAOCULAR MUSCLES: Intact  FACE PALPATION:  Negative  OTOSCOPY: Right ear normal.  Left tympanic membrane has some anterior tympanosclerosis as well as an area of monomeric membrane. The middle ear space is well aerated. TUNING FORKS: Rinne ++ Oliveira midline at 512 Hz  INTRANASAL:  Septum midline, turbinates normal, meati clear. LIPS, TEETH, GINGIVA:  Normal mucosa  PHARYNX:  Normal  NECK:  No masses or adenopathy  SALIVARY GLANDS:  Normal  THYROID:  Normal    IMPRESSION: Status post multiple procedures on his left tympanic membrane with a monomeric membrane and some tympanosclerosis. I see no perforation. PLAN: Patient advised that he does not have a perforation. He is not interested in audiometry at this time. FOLLOW-UP: As needed.

## 2019-06-24 ENCOUNTER — HOSPITAL ENCOUNTER (OUTPATIENT)
Age: 64
Discharge: HOME | End: 2019-06-24
Payer: MEDICARE

## 2019-06-24 DIAGNOSIS — E87.5 SERUM POTASSIUM ELEVATED: ICD-10-CM

## 2019-06-24 DIAGNOSIS — R79.9 ABNORMAL BLOOD CHEMISTRY: ICD-10-CM

## 2019-06-24 LAB
ANION GAP SERPL CALCULATED.3IONS-SCNC: 11 MMOL/L (ref 3–16)
BUN BLDV-MCNC: 24 MG/DL (ref 7–20)
CALCIUM SERPL-MCNC: 9.6 MG/DL (ref 8.3–10.6)
CHLORIDE BLD-SCNC: 104 MMOL/L (ref 99–110)
CO2: 28 MMOL/L (ref 21–32)
CREAT SERPL-MCNC: 1.2 MG/DL (ref 0.8–1.3)
GFR AFRICAN AMERICAN: >60
GFR NON-AFRICAN AMERICAN: >60
GLUCOSE BLD-MCNC: 122 MG/DL (ref 70–99)
POTASSIUM SERPL-SCNC: 5 MMOL/L (ref 3.5–5.1)
SODIUM BLD-SCNC: 143 MMOL/L (ref 136–145)

## 2019-06-24 PROCEDURE — 36415 COLL VENOUS BLD VENIPUNCTURE: CPT

## 2019-06-24 PROCEDURE — 80048 BASIC METABOLIC PNL TOTAL CA: CPT

## 2019-09-20 NOTE — PROGRESS NOTES
"""S/P IOL OU: OD: Tecnis ZCB00 26.0FemtoOmidria. OS: Tecnis ZCB00 25 (ORA) +ORA/Arcs. " [address]      Cardiology [facility] up    Bruna Kurtz  1955    March 14, 2018    Cc: \"I have no chest pain\"     HPI:  The patient is 58 y.o. male with a past medical history significant for HTN, HLD & sleep apena. He is here for an annual check up for his hypertension. He is feeling good and is working on his home doing remodeling without any symptoms. He denies CP, pressure, tightness, edema, SOB, heart racing, palpitations, lightheaded, dizziness, PND or orthopnea. He walks his dog daily. He also reports that he no longer wears his CPAP.        Past Medical History:   Diagnosis Date    Anxiety     BIPOLAR DISORDER     HYPERCHOLESTERAEMIA     Hypothyroid     Kidney disease     OCD (obsessive compulsive disorder)     PTSD     Screening PSA (prostate specific antigen) 1.3.11    result - 2.91    Sleep apnea      Past Surgical History:   Procedure Laterality Date    COLONOSCOPY  4.27.05    normal, Dr. Merida Standing COLONOSCOPY  5/16/14    normal, rhoades    EXTERNAL AUDITORY CANAL RECONSTRUCTION      left 1980 and 1995    LYMPH NODE BIOPSY  4/27/2010    removed groin, benign, dr Jose Tran ENDOSCOPY  5/16/14    Toma moore, check in one year    UPPER GASTROINTESTINAL ENDOSCOPY  6/1/15    Toma Carreno, check one year     Family History   Problem Relation Age of Onset    Heart Disease Mother     Heart Disease Father     Lung Cancer Sister     Diabetes Maternal Uncle     Diabetes Maternal Grandmother     Arthritis Other      Social History   Substance Use Topics    Smoking status: Never Smoker    Smokeless tobacco: Never Used    Alcohol use No       Allergies   Allergen Reactions    Codeine      Current Outpatient Prescriptions   Medication Sig Dispense Refill    simvastatin (ZOCOR) 40 MG tablet TAKE 1 TABLET NIGHTLY 90 tablet 2    levothyroxine (SYNTHROID) 175 MCG tablet TAKE 1 TABLET DAILY 90 tablet 1    Date    TRIG 79 12/12/2017    HDL 61 12/12/2017    HDL 54 01/31/2012    LDLCALC 72 12/12/2017    LABVLDL 16 03/23/2017     BUN/Creatinine:    Lab Results   Component Value Date    BUN 23 12/12/2017    CREATININE 1.14 12/12/2017     EKG Interpretation: 7/12/17, reviewed showed s bardycardia. 3/14/18, Marked sinus  Bradycardia  -First degree A-V block-HR 47       Image Review:     ECHO: 4/2010  LVEF 65%    Assessment/Plan:     Hypertension  BP is stable today. BMP from 12/2017 stable.      Hyperlipidemia  Last lipid profile from 12/2017 was within acceptable limits. Continue Zocor.       Bradycardia  Pt continues to have marked bradycardia on clinical exam. His EKG shows sinus bradycardia with HR 47 He continues to remain asymptomatic. This is very likely due to high vagal tone. I will continue to follow him clinically. TSH from 3/2017 WNL. BRIDGETTE  Pt is no longer using CPAP machine and refuses to wear it. Patient to follow up in 1 year. Thank you very much for allowing me to participate in the care of your patient. Please do not hesitate to contact me if you have any questions.     Sincerely,  MD DAGOBERTO [address] [place] [address], [address] [place] [address] Hugh Gonzalez [facility]  Ph: [phone]  Fax: [phone]

## 2019-09-30 RX ORDER — SIMVASTATIN 40 MG
40 TABLET ORAL NIGHTLY
Qty: 90 TABLET | Refills: 2 | Status: SHIPPED | OUTPATIENT
Start: 2019-09-30 | End: 2020-05-11 | Stop reason: SDUPTHER

## 2019-09-30 RX ORDER — LEVOTHYROXINE SODIUM 175 UG/1
TABLET ORAL
Qty: 90 TABLET | Refills: 2 | Status: SHIPPED | OUTPATIENT
Start: 2019-09-30 | End: 2020-05-11 | Stop reason: SDUPTHER

## 2019-10-14 ENCOUNTER — OFFICE VISIT (OUTPATIENT)
Dept: FAMILY MEDICINE CLINIC | Age: 64
End: 2019-10-14
Payer: MEDICARE

## 2019-10-14 VITALS
BODY MASS INDEX: 34.02 KG/M2 | HEIGHT: 63 IN | DIASTOLIC BLOOD PRESSURE: 80 MMHG | HEART RATE: 76 BPM | SYSTOLIC BLOOD PRESSURE: 138 MMHG | WEIGHT: 192 LBS | TEMPERATURE: 97.6 F

## 2019-10-14 DIAGNOSIS — E11.9 TYPE 2 DIABETES MELLITUS WITHOUT COMPLICATION, WITHOUT LONG-TERM CURRENT USE OF INSULIN (HCC): ICD-10-CM

## 2019-10-14 DIAGNOSIS — E11.9 TYPE 2 DIABETES MELLITUS WITHOUT COMPLICATION, WITHOUT LONG-TERM CURRENT USE OF INSULIN (HCC): Primary | ICD-10-CM

## 2019-10-14 LAB — GLUCOSE BLD-MCNC: 113 MG/DL (ref 70–99)

## 2019-10-14 PROCEDURE — 1036F TOBACCO NON-USER: CPT | Performed by: FAMILY MEDICINE

## 2019-10-14 PROCEDURE — G8417 CALC BMI ABV UP PARAM F/U: HCPCS | Performed by: FAMILY MEDICINE

## 2019-10-14 PROCEDURE — 3017F COLORECTAL CA SCREEN DOC REV: CPT | Performed by: FAMILY MEDICINE

## 2019-10-14 PROCEDURE — G8484 FLU IMMUNIZE NO ADMIN: HCPCS | Performed by: FAMILY MEDICINE

## 2019-10-14 PROCEDURE — G8427 DOCREV CUR MEDS BY ELIG CLIN: HCPCS | Performed by: FAMILY MEDICINE

## 2019-10-14 PROCEDURE — 3044F HG A1C LEVEL LT 7.0%: CPT | Performed by: FAMILY MEDICINE

## 2019-10-14 PROCEDURE — 2022F DILAT RTA XM EVC RTNOPTHY: CPT | Performed by: FAMILY MEDICINE

## 2019-10-14 PROCEDURE — 99213 OFFICE O/P EST LOW 20 MIN: CPT | Performed by: FAMILY MEDICINE

## 2019-10-15 LAB
ESTIMATED AVERAGE GLUCOSE: 128.4 MG/DL
HBA1C MFR BLD: 6.1 %

## 2020-02-06 ENCOUNTER — OFFICE VISIT (OUTPATIENT)
Dept: FAMILY MEDICINE CLINIC | Age: 65
End: 2020-02-06
Payer: MEDICARE

## 2020-02-06 VITALS
SYSTOLIC BLOOD PRESSURE: 132 MMHG | DIASTOLIC BLOOD PRESSURE: 84 MMHG | BODY MASS INDEX: 34.38 KG/M2 | WEIGHT: 194 LBS | HEIGHT: 63 IN | HEART RATE: 60 BPM | TEMPERATURE: 98.6 F

## 2020-02-06 DIAGNOSIS — E11.9 TYPE 2 DIABETES MELLITUS WITHOUT COMPLICATION, WITHOUT LONG-TERM CURRENT USE OF INSULIN (HCC): ICD-10-CM

## 2020-02-06 DIAGNOSIS — I10 ESSENTIAL HYPERTENSION: ICD-10-CM

## 2020-02-06 LAB
BILIRUBIN URINE: NEGATIVE
BLOOD, URINE: NEGATIVE
CLARITY: CLEAR
COLOR: YELLOW
CREATININE URINE: 196.8 MG/DL (ref 39–259)
EPITHELIAL CELLS, UA: 0 /HPF (ref 0–5)
GLUCOSE BLD-MCNC: 100 MG/DL (ref 70–99)
GLUCOSE URINE: NEGATIVE MG/DL
HYALINE CASTS: 1 /LPF (ref 0–8)
KETONES, URINE: NEGATIVE MG/DL
LEUKOCYTE ESTERASE, URINE: NEGATIVE
MICROALBUMIN UR-MCNC: <1.2 MG/DL
MICROALBUMIN/CREAT UR-RTO: NORMAL MG/G (ref 0–30)
MICROSCOPIC EXAMINATION: NORMAL
NITRITE, URINE: NEGATIVE
PH UA: 6 (ref 5–8)
PROTEIN UA: NEGATIVE MG/DL
RBC UA: 2 /HPF (ref 0–4)
SPECIFIC GRAVITY UA: 1.03 (ref 1–1.03)
URINE TYPE: NORMAL
UROBILINOGEN, URINE: 0.2 E.U./DL
WBC UA: 1 /HPF (ref 0–5)

## 2020-02-06 PROCEDURE — 1036F TOBACCO NON-USER: CPT | Performed by: FAMILY MEDICINE

## 2020-02-06 PROCEDURE — G8427 DOCREV CUR MEDS BY ELIG CLIN: HCPCS | Performed by: FAMILY MEDICINE

## 2020-02-06 PROCEDURE — G8417 CALC BMI ABV UP PARAM F/U: HCPCS | Performed by: FAMILY MEDICINE

## 2020-02-06 PROCEDURE — 2022F DILAT RTA XM EVC RTNOPTHY: CPT | Performed by: FAMILY MEDICINE

## 2020-02-06 PROCEDURE — 3046F HEMOGLOBIN A1C LEVEL >9.0%: CPT | Performed by: FAMILY MEDICINE

## 2020-02-06 PROCEDURE — G8484 FLU IMMUNIZE NO ADMIN: HCPCS | Performed by: FAMILY MEDICINE

## 2020-02-06 PROCEDURE — 99213 OFFICE O/P EST LOW 20 MIN: CPT | Performed by: FAMILY MEDICINE

## 2020-02-06 PROCEDURE — 3017F COLORECTAL CA SCREEN DOC REV: CPT | Performed by: FAMILY MEDICINE

## 2020-02-06 NOTE — PROGRESS NOTES
108/66        Review of Systems    Objective:   Physical Exam  Constitutional:       General: He is not in acute distress. Appearance: Normal appearance. He is well-developed. He is not ill-appearing or diaphoretic. Eyes:      General: No scleral icterus. Cardiovascular:      Rate and Rhythm: Normal rate and regular rhythm. Heart sounds: Normal heart sounds. No murmur. No friction rub. No gallop. Comments:     Pulmonary:      Effort: Pulmonary effort is normal. No tachypnea, accessory muscle usage or respiratory distress. Breath sounds: Normal breath sounds. No decreased breath sounds, wheezing, rhonchi or rales. Abdominal:      General: Bowel sounds are normal. There is no distension or abdominal bruit. Palpations: Abdomen is soft. There is no hepatomegaly, splenomegaly, mass or pulsatile mass. Tenderness: There is no abdominal tenderness. There is no guarding. Lymphadenopathy:      Cervical: No cervical adenopathy. Upper Body:      Right upper body: No supraclavicular adenopathy. Left upper body: No supraclavicular adenopathy. Skin:     General: Skin is warm and dry. Coloration: Skin is not pale. Nails: There is no clubbing. Neurological:      General: No focal deficit present. Mental Status: He is alert. Assessment:       Diagnosis Orders   1. Essential hypertension  Urinalysis with Microscopic   2. Type 2 diabetes mellitus without complication, without long-term current use of insulin ([facility])  Hemoglobin A1C    Glucose    Microalbumin / Creatinine Urine Ratio   3.  Gastroesophageal reflux disease, esophagitis presence not specified                 Plan:      See dr Paloma Boston for a check on the reflux  Continue the medications and diet  See in 4 months        Loreta Ramirez MD

## 2020-02-07 LAB
ESTIMATED AVERAGE GLUCOSE: 137 MG/DL
HBA1C MFR BLD: 6.4 %

## 2020-02-27 ENCOUNTER — HOSPITAL ENCOUNTER (OUTPATIENT)
Dept: NUCLEAR MEDICINE | Age: 65
Discharge: HOME | End: 2020-02-27
Payer: MEDICARE

## 2020-02-27 PROCEDURE — 78264 GASTRIC EMPTYING IMG STUDY: CPT

## 2020-02-27 PROCEDURE — 3430000000 HC RX DIAGNOSTIC RADIOPHARMACEUTICAL: Performed by: INTERNAL MEDICINE

## 2020-02-27 PROCEDURE — A9541 TC99M SULFUR COLLOID: HCPCS | Performed by: INTERNAL MEDICINE

## 2020-02-27 RX ADMIN — Medication 1 MILLICURIE: at 09:48

## 2020-03-05 LAB
ANION GAP SERPL CALCULATED.3IONS-SCNC: 12 MMOL/L (ref 3–16)
BUN BLDV-MCNC: 26 MG/DL (ref 7–20)
CALCIUM SERPL-MCNC: 9.6 MG/DL (ref 8.3–10.6)
CHLORIDE BLD-SCNC: 101 MMOL/L (ref 99–110)
CO2: 26 MMOL/L (ref 21–32)
CREAT SERPL-MCNC: 1.5 MG/DL (ref 0.8–1.3)
GFR AFRICAN AMERICAN: 57
GFR NON-AFRICAN AMERICAN: 47
GLUCOSE BLD-MCNC: 99 MG/DL (ref 70–99)
MAGNESIUM: 2 MG/DL (ref 1.8–2.4)
POTASSIUM SERPL-SCNC: 4.5 MMOL/L (ref 3.5–5.1)
SODIUM BLD-SCNC: 139 MMOL/L (ref 136–145)
VITAMIN B-12: 480 PG/ML (ref 211–911)

## 2020-03-07 LAB — VITAMIN D 1,25-DIHYDROXY: 33.6 PG/ML (ref 19.9–79.3)

## 2020-05-12 RX ORDER — LEVOTHYROXINE SODIUM 175 UG/1
TABLET ORAL
Qty: 90 TABLET | Refills: 1 | Status: SHIPPED | OUTPATIENT
Start: 2020-05-12 | End: 2020-06-08

## 2020-05-12 RX ORDER — SIMVASTATIN 40 MG
40 TABLET ORAL NIGHTLY
Qty: 90 TABLET | Refills: 1 | Status: SHIPPED | OUTPATIENT
Start: 2020-05-12 | End: 2020-09-17

## 2020-05-15 RX ORDER — SIMVASTATIN 40 MG
40 TABLET ORAL NIGHTLY
Qty: 90 TABLET | Status: CANCELLED | OUTPATIENT
Start: 2020-05-15

## 2020-05-15 RX ORDER — LEVOTHYROXINE SODIUM 175 UG/1
TABLET ORAL
Qty: 90 TABLET | Status: CANCELLED | OUTPATIENT
Start: 2020-05-15

## 2020-05-21 LAB
ALBUMIN SERPL-MCNC: 3.9 G/DL (ref 3.4–5)
ALP BLD-CCNC: 60 U/L (ref 40–129)
ALT SERPL-CCNC: 27 U/L (ref 10–40)
AST SERPL-CCNC: 16 U/L (ref 15–37)
BILIRUB SERPL-MCNC: 0.3 MG/DL (ref 0–1)
BILIRUBIN DIRECT: <0.2 MG/DL (ref 0–0.3)
BILIRUBIN, INDIRECT: ABNORMAL MG/DL (ref 0–1)
TOTAL PROTEIN: 6.3 G/DL (ref 6.4–8.2)

## 2020-06-05 ENCOUNTER — HOSPITAL ENCOUNTER (OUTPATIENT)
Age: 65
Discharge: HOME | End: 2020-06-05
Payer: MEDICARE

## 2020-06-05 ENCOUNTER — VIRTUAL VISIT (OUTPATIENT)
Dept: FAMILY MEDICINE CLINIC | Age: 65
End: 2020-06-05
Payer: MEDICARE

## 2020-06-05 LAB
CHOLESTEROL, TOTAL: 142 MG/DL (ref 0–199)
GLUCOSE BLD-MCNC: 115 MG/DL (ref 70–99)
HDLC SERPL-MCNC: 57 MG/DL (ref 40–60)
LDL CHOLESTEROL CALCULATED: 72 MG/DL
TRIGL SERPL-MCNC: 66 MG/DL (ref 0–150)
VLDLC SERPL CALC-MCNC: 13 MG/DL

## 2020-06-05 PROCEDURE — 99443 PR PHYS/QHP TELEPHONE EVALUATION 21-30 MIN: CPT | Performed by: FAMILY MEDICINE

## 2020-06-05 PROCEDURE — 84443 ASSAY THYROID STIM HORMONE: CPT

## 2020-06-05 PROCEDURE — 83036 HEMOGLOBIN GLYCOSYLATED A1C: CPT

## 2020-06-05 PROCEDURE — 82947 ASSAY GLUCOSE BLOOD QUANT: CPT

## 2020-06-05 PROCEDURE — 84153 ASSAY OF PSA TOTAL: CPT

## 2020-06-05 PROCEDURE — G0103 PSA SCREENING: HCPCS

## 2020-06-05 PROCEDURE — 80061 LIPID PANEL: CPT

## 2020-06-05 PROCEDURE — 84439 ASSAY OF FREE THYROXINE: CPT

## 2020-06-05 PROCEDURE — 36415 COLL VENOUS BLD VENIPUNCTURE: CPT

## 2020-06-05 ASSESSMENT — PATIENT HEALTH QUESTIONNAIRE - PHQ9
SUM OF ALL RESPONSES TO PHQ QUESTIONS 1-9: 0
SUM OF ALL RESPONSES TO PHQ9 QUESTIONS 1 & 2: 0
2. FEELING DOWN, DEPRESSED OR HOPELESS: 0
1. LITTLE INTEREST OR PLEASURE IN DOING THINGS: 0
SUM OF ALL RESPONSES TO PHQ QUESTIONS 1-9: 0

## 2020-06-05 ASSESSMENT — ENCOUNTER SYMPTOMS
DIARRHEA: 0
SHORTNESS OF BREATH: 0
NAUSEA: 0
ABDOMINAL DISTENTION: 0
CONSTIPATION: 0
CHEST TIGHTNESS: 0
BLOOD IN STOOL: 0
COUGH: 0
VOMITING: 0
ABDOMINAL PAIN: 0

## 2020-06-05 NOTE — PROGRESS NOTES
Constitutional: Negative for appetite change, chills, fever and unexpected weight change. HENT:        No cold sx    Eyes: Negative for visual disturbance. Respiratory: Negative for cough, chest tightness and shortness of breath. Cardiovascular: Negative for chest pain, palpitations and leg swelling. He is active and denies sx   Gastrointestinal: Negative for abdominal distention, abdominal pain, blood in stool, constipation, diarrhea, nausea and vomiting. No gerd no dysphagia    Endocrine: Negative for polydipsia and polyuria. Genitourinary: Negative for difficulty urinating, dysuria and hematuria. Musculoskeletal:        Numbness feet no sores   Skin: Negative for rash. Neurological: Negative for dizziness. He will get ha occasional not new or severe    Hematological: Does not bruise/bleed easily. Psychiatric/Behavioral:        Still see mental health and voices no concern       Objective:   Physical Exam  Constitutional:       General: He is not in acute distress. Comments: He sounds well over the phone and no distress   Neurological:      Mental Status: He is alert. Assessment:        Diagnosis Orders   1. Type 2 diabetes mellitus without complication, without long-term current use of insulin ([facility])  Hemoglobin A1C    Glucose    Lipid Panel   2. Gastroesophageal reflux disease, esophagitis presence not specified     3. Acquired hypothyroidism  TSH without Reflex    T4, Free   4. Prostate cancer screening  Psa screening      Overall well   He was going back and forth during the interview to get his bp cuff obtain the scale review meds etc  No changes to regimen at this time  He will get the labs at [facility] in [place]    Time 22 min 21 sec    Chhaya Wilson is a 72 y.o. male evaluated via telephone on 6/5/2020.       Consent:  He and/or health care decision maker is aware that that he may receive a bill for this telephone service, depending on his insurance

## 2020-06-06 LAB
ESTIMATED AVERAGE GLUCOSE: 125.5 MG/DL
HBA1C MFR BLD: 6 %
PROSTATE SPECIFIC ANTIGEN: 1.96 NG/ML (ref 0–4)
T4 FREE: 2.2 NG/DL (ref 0.9–1.8)
TSH SERPL DL<=0.05 MIU/L-ACNC: 0.12 UIU/ML (ref 0.27–4.2)

## 2020-06-08 ENCOUNTER — VIRTUAL VISIT (OUTPATIENT)
Dept: FAMILY MEDICINE CLINIC | Age: 65
End: 2020-06-08
Payer: MEDICARE

## 2020-06-08 PROCEDURE — 99442 PR PHYS/QHP TELEPHONE EVALUATION 11-20 MIN: CPT | Performed by: FAMILY MEDICINE

## 2020-06-08 RX ORDER — LEVOTHYROXINE SODIUM 0.15 MG/1
TABLET ORAL
Qty: 90 TABLET | Refills: 0 | Status: SHIPPED | OUTPATIENT
Start: 2020-06-08 | End: 2020-09-02

## 2020-06-08 ASSESSMENT — ENCOUNTER SYMPTOMS
BLOOD IN STOOL: 0
ABDOMINAL PAIN: 0
SHORTNESS OF BREATH: 0
DIARRHEA: 0
CONSTIPATION: 0
NAUSEA: 0
ABDOMINAL DISTENTION: 0
CHEST TIGHTNESS: 0
VOMITING: 0

## 2020-06-15 ENCOUNTER — OFFICE VISIT (OUTPATIENT)
Dept: FAMILY MEDICINE CLINIC | Age: 65
End: 2020-06-15
Payer: MEDICARE

## 2020-06-15 VITALS
DIASTOLIC BLOOD PRESSURE: 82 MMHG | HEIGHT: 63 IN | SYSTOLIC BLOOD PRESSURE: 168 MMHG | WEIGHT: 196 LBS | BODY MASS INDEX: 34.73 KG/M2 | HEART RATE: 52 BPM | TEMPERATURE: 98.5 F

## 2020-06-15 PROCEDURE — 4040F PNEUMOC VAC/ADMIN/RCVD: CPT | Performed by: FAMILY MEDICINE

## 2020-06-15 PROCEDURE — 3017F COLORECTAL CA SCREEN DOC REV: CPT | Performed by: FAMILY MEDICINE

## 2020-06-15 PROCEDURE — 1036F TOBACCO NON-USER: CPT | Performed by: FAMILY MEDICINE

## 2020-06-15 PROCEDURE — 1123F ACP DISCUSS/DSCN MKR DOCD: CPT | Performed by: FAMILY MEDICINE

## 2020-06-15 PROCEDURE — G8417 CALC BMI ABV UP PARAM F/U: HCPCS | Performed by: FAMILY MEDICINE

## 2020-06-15 PROCEDURE — G8427 DOCREV CUR MEDS BY ELIG CLIN: HCPCS | Performed by: FAMILY MEDICINE

## 2020-06-15 PROCEDURE — 99213 OFFICE O/P EST LOW 20 MIN: CPT | Performed by: FAMILY MEDICINE

## 2020-06-15 RX ORDER — NIFEDIPINE 30 MG/1
30 TABLET, EXTENDED RELEASE ORAL DAILY
Qty: 30 TABLET | Refills: 3 | Status: SHIPPED
Start: 2020-06-15 | End: 2020-09-04 | Stop reason: SINTOL

## 2020-06-15 ASSESSMENT — ENCOUNTER SYMPTOMS: SHORTNESS OF BREATH: 0

## 2020-06-18 ENCOUNTER — TELEPHONE (OUTPATIENT)
Dept: FAMILY MEDICINE CLINIC | Age: 65
End: 2020-06-18

## 2020-06-22 ENCOUNTER — TELEPHONE (OUTPATIENT)
Dept: FAMILY MEDICINE CLINIC | Age: 65
End: 2020-06-22

## 2020-06-22 RX ORDER — LOSARTAN POTASSIUM 50 MG/1
50 TABLET ORAL DAILY
Qty: 30 TABLET | Refills: 5 | Status: SHIPPED | OUTPATIENT
Start: 2020-06-22 | End: 2020-12-15

## 2020-07-27 ENCOUNTER — OFFICE VISIT (OUTPATIENT)
Dept: FAMILY MEDICINE CLINIC | Age: 65
End: 2020-07-27
Payer: MEDICARE

## 2020-07-27 VITALS
TEMPERATURE: 98.4 F | HEIGHT: 63 IN | BODY MASS INDEX: 35.97 KG/M2 | DIASTOLIC BLOOD PRESSURE: 86 MMHG | SYSTOLIC BLOOD PRESSURE: 128 MMHG | HEART RATE: 80 BPM | WEIGHT: 203 LBS

## 2020-07-27 DIAGNOSIS — E03.9 ACQUIRED HYPOTHYROIDISM: ICD-10-CM

## 2020-07-27 LAB
T4 FREE: 1 NG/DL (ref 0.9–1.8)
TSH SERPL DL<=0.05 MIU/L-ACNC: 5.3 UIU/ML (ref 0.27–4.2)

## 2020-07-27 PROCEDURE — 3017F COLORECTAL CA SCREEN DOC REV: CPT | Performed by: FAMILY MEDICINE

## 2020-07-27 PROCEDURE — G0009 ADMIN PNEUMOCOCCAL VACCINE: HCPCS | Performed by: FAMILY MEDICINE

## 2020-07-27 PROCEDURE — G8427 DOCREV CUR MEDS BY ELIG CLIN: HCPCS | Performed by: FAMILY MEDICINE

## 2020-07-27 PROCEDURE — 99213 OFFICE O/P EST LOW 20 MIN: CPT | Performed by: FAMILY MEDICINE

## 2020-07-27 PROCEDURE — 1123F ACP DISCUSS/DSCN MKR DOCD: CPT | Performed by: FAMILY MEDICINE

## 2020-07-27 PROCEDURE — 1036F TOBACCO NON-USER: CPT | Performed by: FAMILY MEDICINE

## 2020-07-27 PROCEDURE — G8417 CALC BMI ABV UP PARAM F/U: HCPCS | Performed by: FAMILY MEDICINE

## 2020-07-27 PROCEDURE — 90732 PPSV23 VACC 2 YRS+ SUBQ/IM: CPT | Performed by: FAMILY MEDICINE

## 2020-07-27 PROCEDURE — 4040F PNEUMOC VAC/ADMIN/RCVD: CPT | Performed by: FAMILY MEDICINE

## 2020-07-27 NOTE — PROGRESS NOTES
Subjective:      Patient ID: Basilio Heredia is a 72 y.o. male. Patient presents with: Follow-up: hypertension    He is well and no c/o  Here for the check up  Doing good with the bp meds  He is no ha or occ  No cp  Urine is doing fine no edema  No sob    YOB: 1955    Date of Visit:  7/27/2020     -- Codeine     Current Outpatient Medications:  losartan (COZAAR) 50 MG tablet, Take 1 tablet by mouth daily, Disp: 30 tablet, Rfl: 5  NIFEdipine (PROCARDIA XL) 30 MG extended release tablet, Take 1 tablet by mouth daily, Disp: 30 tablet, Rfl: 3  levothyroxine (SYNTHROID) 150 MCG tablet, TAKE 1 TABLET BY MOUTH  DAILY, Disp: 90 tablet, Rfl: 0  Probiotic Product (ALIGN PO), Take by mouth, Disp: , Rfl:   simvastatin (ZOCOR) 40 MG tablet, Take 1 tablet by mouth nightly, Disp: 90 tablet, Rfl: 1  fluvoxaMINE (LUVOX) 100 MG tablet, , Disp: , Rfl:   pantoprazole (PROTONIX) 20 MG tablet, Take 20 mg by mouth daily, Disp: , Rfl:   ONE TOUCH ULTRA TEST strip, TEST ONCE DAILY, Disp: 30 strip, Rfl: 6  ONETOUCH DELICA LANCETS MISC, USE AS DIRECTED, Disp: 4 each, Rfl: 6  busPIRone (BUSPAR) 10 MG tablet, Take 1 tablet by mouth 2 times daily. (Patient taking differently: 20 mg 2 times daily ), Disp: 60 tablet, Rfl: 4  desvenlafaxine (PRISTIQ) 50 MG TB24, Take 50 mg by mouth daily. , Disp: , Rfl:     No current facility-administered medications for this visit.       ---------------------------               07/27/20                      1303         ---------------------------   BP:          128/86         Site:    Left Upper Arm     Position:     Sitting        Cuff Size:   Large Adult      Pulse:         80           Temp:   98.4 °F (36.9 °C)   TempSrc:    Temporal        Weight: 203 lb (92.1 kg)    Height:   5' 3\" (1.6 m)    ---------------------------  Body mass index is 35.96 kg/m².      Wt Readings from Last 3 Encounters:  07/27/20 : 203 lb (92.1 kg)  06/15/20 : 196 lb (88.9 kg)  02/06/20 : 194 lb (88 kg)    BP Readings from Last 3 Encounters:  07/27/20 : 128/86  06/15/20 : (!) 168/82  02/06/20 : 132/84        Review of Systems    Objective:   Physical Exam  Constitutional:       General: He is not in acute distress. Appearance: Normal appearance. He is well-developed. He is not ill-appearing or diaphoretic. Cardiovascular:      Rate and Rhythm: Normal rate and regular rhythm. Heart sounds: Normal heart sounds. No murmur. No friction rub. No gallop. Comments: No edema legs  Pulmonary:      Effort: Pulmonary effort is normal. No tachypnea, accessory muscle usage or respiratory distress. Breath sounds: Normal breath sounds. No decreased breath sounds, wheezing, rhonchi or rales. Lymphadenopathy:      Upper Body:      Right upper body: No supraclavicular adenopathy. Left upper body: No supraclavicular adenopathy. Skin:     General: Skin is warm and dry. Coloration: Skin is not pale. Neurological:      Mental Status: He is alert. Assessment:        Diagnosis Orders   1. Essential hypertension     2. Acquired hypothyroidism  TSH without Reflex    T4, Free   3.  Need for 23-polyvalent pneumococcal polysaccharide vaccine  PNEUMOVAX 23 subcutaneous/IM (Pneumococcal polysaccharide vaccine 23-valent >= 3yo)         Doing better  reviewed his labs with him now  He agrees to the pneumonia vaccine now      Plan:      Continue the medicine  Need to repeat the thyroid test   See in about 5 months         Jasmine Verdin MD

## 2020-09-04 ENCOUNTER — OFFICE VISIT (OUTPATIENT)
Dept: FAMILY MEDICINE CLINIC | Age: 65
End: 2020-09-04
Payer: MEDICARE

## 2020-09-04 ENCOUNTER — HOSPITAL ENCOUNTER (OUTPATIENT)
Age: 65
Discharge: HOME | End: 2020-09-04
Payer: MEDICARE

## 2020-09-04 VITALS
DIASTOLIC BLOOD PRESSURE: 82 MMHG | WEIGHT: 206 LBS | HEIGHT: 63 IN | SYSTOLIC BLOOD PRESSURE: 128 MMHG | HEART RATE: 56 BPM | TEMPERATURE: 97.5 F | BODY MASS INDEX: 36.5 KG/M2

## 2020-09-04 PROBLEM — E66.01 MORBIDLY OBESE (HCC): Status: ACTIVE | Noted: 2020-09-04

## 2020-09-04 LAB
A/G RATIO: 1.4 (ref 1.1–2.2)
ABO/RH: NORMAL
ALBUMIN SERPL-MCNC: 4.1 G/DL (ref 3.4–5)
ALP BLD-CCNC: 51 U/L (ref 40–129)
ALT SERPL-CCNC: 42 U/L (ref 10–40)
ANION GAP SERPL CALCULATED.3IONS-SCNC: 7 MMOL/L (ref 3–16)
ANTIBODY SCREEN: NORMAL
APTT: 33.7 SEC (ref 24.2–36.2)
AST SERPL-CCNC: 28 U/L (ref 15–37)
BASOPHILS ABSOLUTE: 0 K/UL (ref 0–0.2)
BASOPHILS RELATIVE PERCENT: 0.4 %
BILIRUB SERPL-MCNC: 0.4 MG/DL (ref 0–1)
BUN BLDV-MCNC: 27 MG/DL (ref 7–20)
C-REACTIVE PROTEIN: 2.5 MG/L (ref 0–5.1)
CALCIUM SERPL-MCNC: 9.7 MG/DL (ref 8.3–10.6)
CHLORIDE BLD-SCNC: 102 MMOL/L (ref 99–110)
CO2: 29 MMOL/L (ref 21–32)
CREAT SERPL-MCNC: 1.1 MG/DL (ref 0.8–1.3)
EOSINOPHILS ABSOLUTE: 0.1 K/UL (ref 0–0.6)
EOSINOPHILS RELATIVE PERCENT: 1.5 %
GFR AFRICAN AMERICAN: >60
GFR NON-AFRICAN AMERICAN: >60
GLOBULIN: 2.9 G/DL
GLUCOSE BLD-MCNC: 105 MG/DL (ref 70–99)
HCT VFR BLD CALC: 41.5 % (ref 40.5–52.5)
HEMOGLOBIN: 13.6 G/DL (ref 13.5–17.5)
INR BLD: 1.04 (ref 0.86–1.14)
LYMPHOCYTES ABSOLUTE: 1.6 K/UL (ref 1–5.1)
LYMPHOCYTES RELATIVE PERCENT: 29.7 %
MCH RBC QN AUTO: 28.7 PG (ref 26–34)
MCHC RBC AUTO-ENTMCNC: 32.7 G/DL (ref 31–36)
MCV RBC AUTO: 87.8 FL (ref 80–100)
MONOCYTES ABSOLUTE: 0.6 K/UL (ref 0–1.3)
MONOCYTES RELATIVE PERCENT: 10.8 %
NEUTROPHILS ABSOLUTE: 3 K/UL (ref 1.7–7.7)
NEUTROPHILS RELATIVE PERCENT: 57.6 %
PDW BLD-RTO: 14 % (ref 12.4–15.4)
PLATELET # BLD: 162 K/UL (ref 135–450)
PMV BLD AUTO: 10.4 FL (ref 5–10.5)
POTASSIUM SERPL-SCNC: 5.1 MMOL/L (ref 3.5–5.1)
PROTHROMBIN TIME: 12.1 SEC (ref 10–13.2)
RBC # BLD: 4.73 M/UL (ref 4.2–5.9)
SEDIMENTATION RATE, ERYTHROCYTE: 11 MM/HR (ref 0–20)
SODIUM BLD-SCNC: 138 MMOL/L (ref 136–145)
TOTAL PROTEIN: 7 G/DL (ref 6.4–8.2)
WBC # BLD: 5.2 K/UL (ref 4–11)

## 2020-09-04 PROCEDURE — 86850 RBC ANTIBODY SCREEN: CPT

## 2020-09-04 PROCEDURE — 86900 BLOOD TYPING SEROLOGIC ABO: CPT

## 2020-09-04 PROCEDURE — 86901 BLOOD TYPING SEROLOGIC RH(D): CPT

## 2020-09-04 PROCEDURE — 85610 PROTHROMBIN TIME: CPT

## 2020-09-04 PROCEDURE — 99214 OFFICE O/P EST MOD 30 MIN: CPT | Performed by: FAMILY MEDICINE

## 2020-09-04 PROCEDURE — 2022F DILAT RTA XM EVC RTNOPTHY: CPT | Performed by: FAMILY MEDICINE

## 2020-09-04 PROCEDURE — 86140 C-REACTIVE PROTEIN: CPT

## 2020-09-04 PROCEDURE — 87081 CULTURE SCREEN ONLY: CPT

## 2020-09-04 PROCEDURE — 85025 COMPLETE CBC W/AUTO DIFF WBC: CPT

## 2020-09-04 PROCEDURE — 1036F TOBACCO NON-USER: CPT | Performed by: FAMILY MEDICINE

## 2020-09-04 PROCEDURE — 80053 COMPREHEN METABOLIC PANEL: CPT

## 2020-09-04 PROCEDURE — 36415 COLL VENOUS BLD VENIPUNCTURE: CPT

## 2020-09-04 PROCEDURE — 85730 THROMBOPLASTIN TIME PARTIAL: CPT

## 2020-09-04 PROCEDURE — 85652 RBC SED RATE AUTOMATED: CPT

## 2020-09-04 PROCEDURE — G8427 DOCREV CUR MEDS BY ELIG CLIN: HCPCS | Performed by: FAMILY MEDICINE

## 2020-09-04 PROCEDURE — G8417 CALC BMI ABV UP PARAM F/U: HCPCS | Performed by: FAMILY MEDICINE

## 2020-09-04 PROCEDURE — 3044F HG A1C LEVEL LT 7.0%: CPT | Performed by: FAMILY MEDICINE

## 2020-09-04 PROCEDURE — 1123F ACP DISCUSS/DSCN MKR DOCD: CPT | Performed by: FAMILY MEDICINE

## 2020-09-04 PROCEDURE — 83036 HEMOGLOBIN GLYCOSYLATED A1C: CPT

## 2020-09-04 PROCEDURE — 3017F COLORECTAL CA SCREEN DOC REV: CPT | Performed by: FAMILY MEDICINE

## 2020-09-04 PROCEDURE — 4040F PNEUMOC VAC/ADMIN/RCVD: CPT | Performed by: FAMILY MEDICINE

## 2020-09-04 RX ORDER — CYCLOBENZAPRINE HCL 10 MG
TABLET ORAL PRN
COMMUNITY
Start: 2019-12-12 | End: 2021-02-19 | Stop reason: SDUPTHER

## 2020-09-04 ASSESSMENT — ENCOUNTER SYMPTOMS
COUGH: 0
BLOOD IN STOOL: 0
NAUSEA: 0
DIARRHEA: 0
CONSTIPATION: 0
VOMITING: 0
TROUBLE SWALLOWING: 0
ABDOMINAL DISTENTION: 0
EYE PAIN: 0
VOICE CHANGE: 0
SHORTNESS OF BREATH: 0
ABDOMINAL PAIN: 0
CHEST TIGHTNESS: 0
SORE THROAT: 0

## 2020-09-04 NOTE — PATIENT INSTRUCTIONS
Do the preop labs that the surgeon has written for you  [id number] Christina Zambrano for the surgery   On the morning of the surgery take the protonix and thyroid medicine with just enough water to get them down as soon as you wake up

## 2020-09-04 NOTE — PROGRESS NOTES
Subjective:      Patient ID: Regi Rivero is a 72 y.o. male. Patient presents with:  Pre-op Exam: Right Hip Replacement on 9- by Dr. Katie Concepcion at [facility]. He is for the above  He is well  No fever no cold sx    Allergies:   -- Codeine      height is 5' 3\" (1.6 m) and weight is 206 lb (93.4 kg). His temporal temperature is 97.5 °F (36.4 °C). His blood pressure is 128/82 and his pulse is 56. No tobacco use. No ETOH use      Immunization History  Administered            Date(s) Administered    Pneumococcal Polysaccharide ([id number])                          07/27/2020      Td, unspecified formulation                          06/26/2006      Tdap (Boostrix, Adacel)                          07/27/2017      Current Outpatient Medications:     cyclobenzaprine (FLEXERIL) 10 MG tablet, as needed    levothyroxine (SYNTHROID) 150 MCG tablet, TAKE ONE TABLET BY MOUTH DAILY    losartan (COZAAR) 50 MG tablet, Take 1 tablet by mouth daily      Probiotic Product (ALIGN PO), Take by mouth    simvastatin (ZOCOR) 40 MG tablet, Take 1 tablet by mouth nightly    fluvoxaMINE (LUVOX) 100 MG tablet,     pantoprazole (PROTONIX) 20 MG tablet, Take 20 mg by mouth daily    ONE TOUCH ULTRA TEST strip, TEST ONCE DAILY,    ONETOUCH DELICA LANCETS MISC, USE AS DIRECTED    busPIRone (BUSPAR) 10 MG tablet, Take 1 tablet by mouth 2 times daily.   desvenlafaxine (PRISTIQ) 50 MG TB24, Take 50 mg by mouth daily.     Past Surgical History:  4.27.05: COLONOSCOPY      Comment:  wang, Dr. Pa Hong  5/16/14: COLONOSCOPY      Comment:  jf piña  No date: EXTERNAL AUDITORY CANAL RECONSTRUCTION      Comment:  left 1980 and 1995 4/27/2010: LYMPH NODE BIOPSY      Comment:  removed groin, benign, dr Darnell Fenton  No date: TONSILLECTOMY  5/16/14: UPPER GASTROINTESTINAL ENDOSCOPY      Comment:  jf moore, check in one year  6/1/15: UPPER GASTROINTESTINAL ENDOSCOPY      Comment:  Ilana Lopez, check one year  06/13/2018: UPPER GASTROINTESTINAL ENDOSCOPY      Comment:  oscar Hinojosa, check in 3 years    No problems with anesthesia    Past Medical History: Allergic rhinitis  Anxiety  Arthritis  Bipolar Disorder  Diabetes mellitus (HCC)  GERD (gastroesophageal reflux disease)  Hearing loss  HYPERCHOLESTERAEMIA  Hypertension   Hypothyroid   Kidney disease  Sleep apnea  No cpap use     Family hx  No anesthesia problems          Review of Systems   Constitutional: Negative for appetite change, chills, fever and unexpected weight change. HENT: Negative for sore throat, trouble swallowing and voice change. Permanent dental bridgework. No loose teeth. Front teeth crowns   Eyes: Negative for pain and visual disturbance. Respiratory: Negative for cough, chest tightness and shortness of breath. Cardiovascular: Negative for chest pain, palpitations and leg swelling. No hx of heart disease   Can do heavy yard work stack tree limbs and cut trees using chain saw this summer no cp no sob    Gastrointestinal: Negative for abdominal distention, abdominal pain, blood in stool, constipation, diarrhea, nausea and vomiting. No gerd no dysphagia    Genitourinary: Negative for difficulty urinating, dysuria and hematuria. Skin: Negative for rash. Neurological: Negative for dizziness, syncope and headaches. No hx of CVA   Hematological: Negative for adenopathy. Does not bruise/bleed easily. No hx of blood clot       Objective:   Physical Exam  Constitutional:       General: He is not in acute distress. Appearance: Normal appearance. He is well-developed. He is not ill-appearing or diaphoretic. HENT:      Head: Normocephalic and atraumatic. Right Ear: Tympanic membrane and ear canal normal.      Left Ear: Ear canal normal. Tympanic membrane is scarred. Nose: Nose normal.      Mouth/Throat:      Lips: Pink. Mouth: Mucous membranes are moist. No oral lesions. Pharynx: Oropharynx is clear. Neck:      Musculoskeletal: Neck supple. Thyroid: No thyroid mass or thyromegaly. Cardiovascular:      Rate and Rhythm: Normal rate and regular rhythm. Heart sounds: Normal heart sounds. No murmur. No friction rub. No gallop. Comments:   No edema legs  Pulmonary:      Effort: Pulmonary effort is normal. No tachypnea, accessory muscle usage or respiratory distress. Breath sounds: Normal breath sounds. No decreased breath sounds, wheezing, rhonchi or rales. Abdominal:      General: Bowel sounds are normal. There is no distension or abdominal bruit. Palpations: Abdomen is soft. There is no hepatomegaly, splenomegaly, mass or pulsatile mass. Tenderness: There is no abdominal tenderness. There is no right CVA tenderness, left CVA tenderness or guarding. Lymphadenopathy:      Head:      Right side of head: No submental, submandibular, preauricular or posterior auricular adenopathy. Left side of head: No submental, submandibular, preauricular or posterior auricular adenopathy. Cervical: No cervical adenopathy. Upper Body:      Right upper body: No supraclavicular adenopathy. Left upper body: No supraclavicular adenopathy. Skin:     General: Skin is warm and dry. Coloration: Skin is not pale. Nails: There is no clubbing. Neurological:      General: No focal deficit present. Mental Status: He is alert. Psychiatric:         Attention and Perception: Attention normal.         Speech: Speech normal.         Behavior: Behavior normal.         Assessment:        Diagnosis Orders   1. Preop examination  Hemoglobin A1C   2. Primary osteoarthritis of both hips     3. Essential hypertension     4. Mixed hyperlipidemia     5.  Type 2 diabetes mellitus without complication, without long-term current use of insulin ([facility])  Hemoglobin A1C       He declines flu shot  Diet controlled diabetes  He has been cleared by Cardiology  Corky Signs on 8/18/20      Plan:      Do the preop labs that the surgeon has written for you  Stephani Hudson for the surgery   On the morning of the surgery take the protonix and thyroid medicine with just enough water to get them down as soon as you wake up        Cornelius Navarrete MD

## 2020-09-05 LAB
ESTIMATED AVERAGE GLUCOSE: 145.6 MG/DL
HBA1C MFR BLD: 6.7 %

## 2020-09-06 LAB — MRSA CULTURE ONLY: NORMAL

## 2020-09-08 NOTE — PROGRESS NOTES
[facility] / [facility] ([facility]) [address], [address]    Acknowledgment of Informed Consent for Surgical or Medical Procedure and Sedation  I agree to allow doctor(s) [id number] Guevara and his/her associates or assistants, including residents and/or other qualified medical practitioner to perform the following medical treatment or procedure and to administer or direct the administration of sedation as necessary:  Procedure(s): RIGHT DIRECT ANTERIOR APPROACH TOTAL HIP ARTHROPLASTY  My doctor has explained the following regarding the proposed procedure:   the explanation of the procedure   the benefits of the procedure   the potential problems that might occur during recuperation   the risks and side effects of the procedure which could include but are not limited to severe blood loss, infection, stroke or death   the benefits, risks and side effect of alternative procedures including the consequences of declining this procedure or any alternative procedures   the likelihood of achieving satisfactory results. I acknowledge no guarantee or assurance has been made to me regarding the results. I understand that during the course of this treatment/procedure, unforeseen conditions can occur which require an additional or different procedure. I agree to allow my physician or assistants to perform such extension of the original procedure as they may find necessary. I understand that sedation will often result in temporary impairment of memory and fine motor skills and that sedation can occasionally progress to a state of deep sedation or general anesthesia. I understand the risks of anesthesia for surgery include, but are not limited to, sore throat, hoarseness, injury to face, mouth, or teeth; nausea; headache; injury to blood vessels or nerves; death, brain damage, or paralysis.     I understand that if I have a Limitation of Treatment order in effect during my hospitalization, the order may or may not be in effect during this procedure. I give my doctor permission to give me blood or blood products. I understand that there are risks with receiving blood such as hepatitis, AIDS, fever, or allergic reaction. I acknowledge that the risks, benefits, and alternatives of this treatment have been explained to me and that no express or implied warranty has been given by the hospital, any blood bank, or any person or entity as to the blood or blood components transfused. At the discretion of my doctor, I agree to allow observers, equipment/product representatives and allow photographing, and/or televising of the procedure, provided my name or identity is maintained confidentially. I agree the hospital may dispose of or use for scientific or educational purposes any tissue, fluid, or body parts which may be removed.     ________________________________Date________Time______ am/pm  ([facility])  Patient or Signature of Closest Relative or Legal Guardian    ________________________________Date________Time______am/pm      Page 1 of  1  Witness

## 2020-09-09 ENCOUNTER — TELEPHONE (OUTPATIENT)
Dept: FAMILY MEDICINE CLINIC | Age: 65
End: 2020-09-09

## 2020-09-09 ENCOUNTER — NURSE ONLY (OUTPATIENT)
Dept: PRIMARY CARE CLINIC | Age: 65
End: 2020-09-09
Payer: MEDICARE

## 2020-09-09 PROCEDURE — 99211 OFF/OP EST MAY X REQ PHY/QHP: CPT | Performed by: NURSE PRACTITIONER

## 2020-09-09 NOTE — TELEPHONE ENCOUNTER
SARAH   Pt stated his A1C is up to 6.7 , it is only that high b/c he has been eating out a lot lately. He is going to go back on his diet and eat at the house.

## 2020-09-11 LAB — SARS-COV-2, NAA: NOT DETECTED

## 2020-09-11 NOTE — PROGRESS NOTES
[facility] PRE-SURGICAL TESTING INSTRUCTIONS                              PRIOR TO PROCEDURE DATE:  1. Please follow any guidelines/instructions prior to your procedure as advised by your surgeon. 2. Arrange for someone to drive you home and be with you for the first 24 hours after discharge for your safety after your procedure for which you received sedation. Ensure it is someone we can share information with regarding your discharge. 3. You must contact your surgeon for instructions IF:   You are taking any blood thinners, aspirin, anti-inflammatory or vitamin E.   There is a change in your physical condition such as a cold, fever, rash, cuts, sores or any other infection, especially near your surgical site. 4. Do not drink alcohol the day before or day of your procedure. 5. A Pre-op History and Physical for surgery MUST be completed by your Physician or Urgent Care within 30 days of your procedure date. Please bring a copy with you on the day of your procedure and along with any other testing performed. THE DAY OF YOUR PROCEDURE:  1. Follow instructions for ARRIVAL TIME as DIRECTED BY YOUR SURGEON. I    2. Enter the MAIN entrance from [employer] and follow the signs to the free [employer] or [employer] parking (offered free of charge 6am-5pm). 3. Enter the Main Entrance of the hospital (do not enter from the lower level of the parking garage). Upon entrance, check in with the  at the main desk on your left. If no one is available at the desk, proceed into the [facility] Waiting Room and go through the door directly into the [facility]. There is a Check-in desk ACROSS from Room 5 (marked with a sign hanging from the ceiling). The phone number for the surgery center is [phone]. 4. Please call [phone] option #2 option #2 if you have not been preregistered yet. On the day of your procedure bring your insurance card and photo ID.  You will be registered at your bedside once brought back to your room. 5. DO NOT EAT ANYTHING eight hours prior to surgery. May have 8 ounces of water 4 hours prior to surgery. 6. MEDICATIONS    Take the following medications with a SMALL sip of water: levothyroxine and Protonix.  Use your usual dose of inhalers the morning of surgery. BRING your rescue inhaler with you to hospital.    Anesthesia does NOT want you to take insulin the morning of surgery. They will control your blood sugar while you are at the hospital. Please contact your ordering physician for instructions regarding your insulin the night before your procedure. If you have an insulin pump, please keep it set on basal rate. 7. Do not swallow water when brushing teeth. No gum, candy, mints or ice chips. Refrain from smoking or at least decrease the amount. 8. Dress in loose, comfortable clothing appropriate for redressing after your procedure. Do not wear jewelry (including body piercings), make-up (especially NO eye make-up), fingernail polish (NO toenail polish if foot/leg surgery), lotion, powders or metal hairclips. 9. Dentures, glasses, or contacts will need to be removed before your procedure. Bring cases for your glasses, contacts, dentures, or hearing aids to protect them while you are in surgery. 10. If you use a CPAP, please bring it with you on the day of your procedure. 11. We recommend that valuable personal  belongings such as cash, cell phones, e-tablets or jewelry, be left at home during your stay. The hospital will not be responsible for valuables that are not secured in the hospital safe. However, if your insurance requires a co-pay, you may want to bring a method of payment, i.e. Check or credit card, if you wish to pay your co-pay the day of surgery. 12. If you are to stay overnight, you may bring a bag with personal items.  Please have any large items you may need brought in by your family after your arrival to your hospital room. 15. If you have a Living Will or Durable Power of , please bring a copy on the day of your procedure. 15. With your permission, one family member may accompany you while you are being prepared for surgery. Once you are ready, additional family members may join you. HOW WE KEEP YOU SAFE and WORK TO PREVENT SURGICAL SITE INFECTIONS:  1. Health care workers should always check your ID bracelet to verify your name and birth date. You will be asked many times to state your name, date of birth, and allergies. 2. Health care workers should always clean their hands with soap or alcohol gel before providing care to you. It is okay to ask anyone if they cleaned their hands before they touch you. 3. You will be actively involved in verifying the type of procedure you are having and ensuring the correct surgical site. This will be confirmed multiple times prior to your procedure. Do NOT caesar your surgery site UNLESS instructed to by your surgeon. 4. Do not shave or wax for 72 hours prior to procedure near your operative site. Shaving with a razor can irritate your skin and make it easier to develop an infection. On the day of your procedure, any hair that needs to be removed near the surgical site will be clipped by a healthcare worker using a special clippers designed to avoid skin irritation. 5. When you are in the operating room, your surgical site will be cleansed with a special soap, and in most cases, you will be given an antibiotic before the surgery begins. What to expect AFTER YOUR PROCEDURE:  1. Immediately following your procedure, your will be taken to the PACU for the first phase of your recovery. Your nurse will help you recover from any potential side effects of anesthesia, such as extreme drowsiness, changes in your vital signs or breathing patterns. Nausea, headache, muscle aches, or sore throat may also occur after anesthesia.   Your nurse will help you manage these potential side effects. 2. For comfort and safety, arrange to have someone at home with you for the first 24 hours after discharge. 3. You and your family will be given written instructions about your diet, activity, dressing care, medications, and return visits. 4. Once at home, should issues with nausea, pain, or bleeding occur, or should you notice any signs of infection, you should call your surgeon. 5. Always clean your hands before and after caring for your wound. Do not let your family touch your surgery site without cleaning their hands. 6. Narcotic pain medications can cause significant constipation. You may want to add a stool softener to your postoperative medication schedule or speak to your surgeon on how best to manage this SIDE EFFECT. SPECIAL INSTRUCTIONS    Thank you for allowing us to care for you. We strive to exceed your expectations in the delivery of care and service provided to you and your family. If you need to contact us for any reason, please call us at [phone]    Instructions reviewed with patient during preadmission testing phone interview. Janette Shane. 9/11/2020 .8:00 AM      ADDITIONAL EDUCATIONAL INFORMATION REVIEWED PER PHONE WITH YOU AND/OR YOUR FAMILY:  Yes Antibacterial Soap  Yes Incentive Spirometer Education pre-op

## 2020-09-14 ENCOUNTER — ANESTHESIA EVENT (OUTPATIENT)
Dept: OPERATING ROOM | Age: 65
DRG: 470 | End: 2020-09-14
Payer: MEDICARE

## 2020-09-15 ENCOUNTER — HOSPITAL ENCOUNTER (INPATIENT)
Age: 65
LOS: 1 days | Discharge: HOME | DRG: 470 | End: 2020-09-17
Attending: ORTHOPAEDIC SURGERY | Admitting: ORTHOPAEDIC SURGERY
Payer: MEDICARE

## 2020-09-15 ENCOUNTER — APPOINTMENT (OUTPATIENT)
Dept: GENERAL RADIOLOGY | Age: 65
DRG: 470 | End: 2020-09-15
Attending: ORTHOPAEDIC SURGERY
Payer: MEDICARE

## 2020-09-15 ENCOUNTER — ANESTHESIA (OUTPATIENT)
Dept: OPERATING ROOM | Age: 65
DRG: 470 | End: 2020-09-15
Payer: MEDICARE

## 2020-09-15 VITALS — SYSTOLIC BLOOD PRESSURE: 111 MMHG | OXYGEN SATURATION: 97 % | DIASTOLIC BLOOD PRESSURE: 61 MMHG

## 2020-09-15 PROBLEM — M16.11 ARTHRITIS OF RIGHT HIP: Status: ACTIVE | Noted: 2020-09-15

## 2020-09-15 LAB
GLUCOSE BLD-MCNC: 106 MG/DL (ref 70–99)
GLUCOSE BLD-MCNC: 150 MG/DL (ref 70–99)
PERFORMED ON: ABNORMAL
PERFORMED ON: ABNORMAL

## 2020-09-15 PROCEDURE — 6360000002 HC RX W HCPCS: Performed by: NURSE ANESTHETIST, CERTIFIED REGISTERED

## 2020-09-15 PROCEDURE — 94799 UNLISTED PULMONARY SVC/PX: CPT

## 2020-09-15 PROCEDURE — 3209999900 FLUORO FOR SURGICAL PROCEDURES

## 2020-09-15 PROCEDURE — 6360000002 HC RX W HCPCS: Performed by: ORTHOPAEDIC SURGERY

## 2020-09-15 PROCEDURE — 6370000000 HC RX 637 (ALT 250 FOR IP): Performed by: ORTHOPAEDIC SURGERY

## 2020-09-15 PROCEDURE — 0SR906Z REPLACEMENT OF RIGHT HIP JOINT WITH OXIDIZED ZIRCONIUM ON POLYETHYLENE SYNTHETIC SUBSTITUTE, OPEN APPROACH: ICD-10-PCS | Performed by: ORTHOPAEDIC SURGERY

## 2020-09-15 PROCEDURE — 3700000000 HC ANESTHESIA ATTENDED CARE: Performed by: ORTHOPAEDIC SURGERY

## 2020-09-15 PROCEDURE — 2709999900 HC NON-CHARGEABLE SUPPLY: Performed by: ORTHOPAEDIC SURGERY

## 2020-09-15 PROCEDURE — 7100000001 HC PACU RECOVERY - ADDTL 15 MIN: Performed by: ORTHOPAEDIC SURGERY

## 2020-09-15 PROCEDURE — 2580000003 HC RX 258: Performed by: ANESTHESIOLOGY

## 2020-09-15 PROCEDURE — 2720000010 HC SURG SUPPLY STERILE: Performed by: ORTHOPAEDIC SURGERY

## 2020-09-15 PROCEDURE — G0378 HOSPITAL OBSERVATION PER HR: HCPCS

## 2020-09-15 PROCEDURE — 72170 X-RAY EXAM OF PELVIS: CPT

## 2020-09-15 PROCEDURE — 3600000014 HC SURGERY LEVEL 4 ADDTL 15MIN: Performed by: ORTHOPAEDIC SURGERY

## 2020-09-15 PROCEDURE — C1776 JOINT DEVICE (IMPLANTABLE): HCPCS | Performed by: ORTHOPAEDIC SURGERY

## 2020-09-15 PROCEDURE — 73502 X-RAY EXAM HIP UNI 2-3 VIEWS: CPT

## 2020-09-15 PROCEDURE — 94150 VITAL CAPACITY TEST: CPT

## 2020-09-15 PROCEDURE — 3600000004 HC SURGERY LEVEL 4 BASE: Performed by: ORTHOPAEDIC SURGERY

## 2020-09-15 PROCEDURE — 3700000001 HC ADD 15 MINUTES (ANESTHESIA): Performed by: ORTHOPAEDIC SURGERY

## 2020-09-15 PROCEDURE — 2580000003 HC RX 258: Performed by: ORTHOPAEDIC SURGERY

## 2020-09-15 PROCEDURE — 2580000003 HC RX 258: Performed by: NURSE ANESTHETIST, CERTIFIED REGISTERED

## 2020-09-15 PROCEDURE — 6360000002 HC RX W HCPCS: Performed by: ANESTHESIOLOGY

## 2020-09-15 PROCEDURE — 2500000003 HC RX 250 WO HCPCS: Performed by: NURSE ANESTHETIST, CERTIFIED REGISTERED

## 2020-09-15 PROCEDURE — 7100000000 HC PACU RECOVERY - FIRST 15 MIN: Performed by: ORTHOPAEDIC SURGERY

## 2020-09-15 PROCEDURE — 62327 NJX INTERLAMINAR LMBR/SAC: CPT | Performed by: ANESTHESIOLOGY

## 2020-09-15 DEVICE — SHELL ACET SZ D DIA50MM 3 CLUS H TRITANIUM PRESSFIT PRI: Type: IMPLANTABLE DEVICE | Site: HIP | Status: FUNCTIONAL

## 2020-09-15 DEVICE — LINER ACET SZ D ID36MM THK3.9MM 0DEG HIP X3 LOK RNG FOR: Type: IMPLANTABLE DEVICE | Site: HIP | Status: FUNCTIONAL

## 2020-09-15 DEVICE — OXINIUM FEMORAL HEAD 12/14 TAPER                                    36 MM L/+8
Type: IMPLANTABLE DEVICE | Site: HIP | Status: FUNCTIONAL
Brand: OXINIUM

## 2020-09-15 DEVICE — POLARSTEM COLLAR STANDARD                                    NON-CEMENTED WITH TI/HA 2
Type: IMPLANTABLE DEVICE | Site: HIP | Status: FUNCTIONAL
Brand: POLARSTEM

## 2020-09-15 RX ORDER — OXYCODONE HYDROCHLORIDE 15 MG/1
15 TABLET ORAL EVERY 4 HOURS PRN
Status: DISCONTINUED | OUTPATIENT
Start: 2020-09-15 | End: 2020-09-16

## 2020-09-15 RX ORDER — ACETAMINOPHEN 500 MG
1000 TABLET ORAL EVERY 8 HOURS SCHEDULED
Status: DISCONTINUED | OUTPATIENT
Start: 2020-09-15 | End: 2020-09-17 | Stop reason: HOSPADM

## 2020-09-15 RX ORDER — OXYCODONE HYDROCHLORIDE 5 MG/1
5 TABLET ORAL EVERY 6 HOURS PRN
Qty: 28 TABLET | Refills: 0 | Status: SHIPPED | OUTPATIENT
Start: 2020-09-15 | End: 2020-09-22

## 2020-09-15 RX ORDER — OXYCODONE HYDROCHLORIDE AND ACETAMINOPHEN 5; 325 MG/1; MG/1
2 TABLET ORAL PRN
Status: DISCONTINUED | OUTPATIENT
Start: 2020-09-15 | End: 2020-09-15 | Stop reason: HOSPADM

## 2020-09-15 RX ORDER — PROPOFOL 10 MG/ML
INJECTION, EMULSION INTRAVENOUS CONTINUOUS PRN
Status: DISCONTINUED | OUTPATIENT
Start: 2020-09-15 | End: 2020-09-15 | Stop reason: SDUPTHER

## 2020-09-15 RX ORDER — LACTOBACILLUS RHAMNOSUS GG 10B CELL
1 CAPSULE ORAL DAILY
Status: DISCONTINUED | OUTPATIENT
Start: 2020-09-15 | End: 2020-09-17 | Stop reason: HOSPADM

## 2020-09-15 RX ORDER — LEVOTHYROXINE SODIUM 0.15 MG/1
150 TABLET ORAL DAILY
Status: DISCONTINUED | OUTPATIENT
Start: 2020-09-16 | End: 2020-09-17 | Stop reason: HOSPADM

## 2020-09-15 RX ORDER — HYDRALAZINE HYDROCHLORIDE 20 MG/ML
5 INJECTION INTRAMUSCULAR; INTRAVENOUS EVERY 10 MIN PRN
Status: DISCONTINUED | OUTPATIENT
Start: 2020-09-15 | End: 2020-09-15 | Stop reason: HOSPADM

## 2020-09-15 RX ORDER — SODIUM CHLORIDE 0.9 % (FLUSH) 0.9 %
10 SYRINGE (ML) INJECTION EVERY 12 HOURS SCHEDULED
Status: DISCONTINUED | OUTPATIENT
Start: 2020-09-15 | End: 2020-09-17 | Stop reason: HOSPADM

## 2020-09-15 RX ORDER — MIDAZOLAM HYDROCHLORIDE 1 MG/ML
2 INJECTION INTRAMUSCULAR; INTRAVENOUS ONCE
Status: COMPLETED | OUTPATIENT
Start: 2020-09-15 | End: 2020-09-15

## 2020-09-15 RX ORDER — LABETALOL 20 MG/4 ML (5 MG/ML) INTRAVENOUS SYRINGE
5 EVERY 10 MIN PRN
Status: DISCONTINUED | OUTPATIENT
Start: 2020-09-15 | End: 2020-09-15 | Stop reason: HOSPADM

## 2020-09-15 RX ORDER — FENTANYL CITRATE 50 UG/ML
25 INJECTION, SOLUTION INTRAMUSCULAR; INTRAVENOUS EVERY 5 MIN PRN
Status: DISCONTINUED | OUTPATIENT
Start: 2020-09-15 | End: 2020-09-15 | Stop reason: HOSPADM

## 2020-09-15 RX ORDER — SODIUM CHLORIDE 0.9 % (FLUSH) 0.9 %
10 SYRINGE (ML) INJECTION PRN
Status: DISCONTINUED | OUTPATIENT
Start: 2020-09-15 | End: 2020-09-15 | Stop reason: HOSPADM

## 2020-09-15 RX ORDER — LIDOCAINE HYDROCHLORIDE 20 MG/ML
INJECTION, SOLUTION INFILTRATION; PERINEURAL PRN
Status: DISCONTINUED | OUTPATIENT
Start: 2020-09-15 | End: 2020-09-15 | Stop reason: SDUPTHER

## 2020-09-15 RX ORDER — MAGNESIUM HYDROXIDE 1200 MG/15ML
LIQUID ORAL CONTINUOUS PRN
Status: COMPLETED | OUTPATIENT
Start: 2020-09-15 | End: 2020-09-15

## 2020-09-15 RX ORDER — PANTOPRAZOLE SODIUM 20 MG/1
20 TABLET, DELAYED RELEASE ORAL DAILY
Status: DISCONTINUED | OUTPATIENT
Start: 2020-09-16 | End: 2020-09-17 | Stop reason: HOSPADM

## 2020-09-15 RX ORDER — LOSARTAN POTASSIUM 50 MG/1
50 TABLET ORAL DAILY
Status: DISCONTINUED | OUTPATIENT
Start: 2020-09-15 | End: 2020-09-17 | Stop reason: HOSPADM

## 2020-09-15 RX ORDER — OXYCODONE HYDROCHLORIDE 5 MG/1
10 TABLET ORAL EVERY 4 HOURS PRN
Status: DISCONTINUED | OUTPATIENT
Start: 2020-09-15 | End: 2020-09-16

## 2020-09-15 RX ORDER — SODIUM CHLORIDE 0.9 % (FLUSH) 0.9 %
10 SYRINGE (ML) INJECTION EVERY 12 HOURS SCHEDULED
Status: DISCONTINUED | OUTPATIENT
Start: 2020-09-15 | End: 2020-09-15 | Stop reason: HOSPADM

## 2020-09-15 RX ORDER — FLUVOXAMINE MALEATE 100 MG
100 TABLET ORAL NIGHTLY
Status: DISCONTINUED | OUTPATIENT
Start: 2020-09-15 | End: 2020-09-17 | Stop reason: HOSPADM

## 2020-09-15 RX ORDER — ONDANSETRON 2 MG/ML
4 INJECTION INTRAMUSCULAR; INTRAVENOUS EVERY 6 HOURS PRN
Status: DISCONTINUED | OUTPATIENT
Start: 2020-09-15 | End: 2020-09-17 | Stop reason: HOSPADM

## 2020-09-15 RX ORDER — SENNA AND DOCUSATE SODIUM 50; 8.6 MG/1; MG/1
1 TABLET, FILM COATED ORAL 2 TIMES DAILY
Status: DISCONTINUED | OUTPATIENT
Start: 2020-09-15 | End: 2020-09-17 | Stop reason: HOSPADM

## 2020-09-15 RX ORDER — ATORVASTATIN CALCIUM 20 MG/1
40 TABLET, FILM COATED ORAL NIGHTLY
Status: DISCONTINUED | OUTPATIENT
Start: 2020-09-15 | End: 2020-09-17 | Stop reason: HOSPADM

## 2020-09-15 RX ORDER — SODIUM CHLORIDE, SODIUM LACTATE, POTASSIUM CHLORIDE, CALCIUM CHLORIDE 600; 310; 30; 20 MG/100ML; MG/100ML; MG/100ML; MG/100ML
INJECTION, SOLUTION INTRAVENOUS CONTINUOUS
Status: DISCONTINUED | OUTPATIENT
Start: 2020-09-15 | End: 2020-09-15

## 2020-09-15 RX ORDER — FENTANYL CITRATE 50 UG/ML
50 INJECTION, SOLUTION INTRAMUSCULAR; INTRAVENOUS EVERY 5 MIN PRN
Status: DISCONTINUED | OUTPATIENT
Start: 2020-09-15 | End: 2020-09-15 | Stop reason: HOSPADM

## 2020-09-15 RX ORDER — EPHEDRINE SULFATE 50 MG/ML
INJECTION, SOLUTION INTRAVENOUS PRN
Status: DISCONTINUED | OUTPATIENT
Start: 2020-09-15 | End: 2020-09-15 | Stop reason: SDUPTHER

## 2020-09-15 RX ORDER — GABAPENTIN 100 MG/1
CAPSULE ORAL
COMMUNITY
End: 2021-04-29

## 2020-09-15 RX ORDER — SODIUM CHLORIDE 9 MG/ML
INJECTION, SOLUTION INTRAVENOUS CONTINUOUS
Status: DISCONTINUED | OUTPATIENT
Start: 2020-09-15 | End: 2020-09-17 | Stop reason: HOSPADM

## 2020-09-15 RX ORDER — ASPIRIN 81 MG/1
81 TABLET ORAL DAILY
Status: DISCONTINUED | OUTPATIENT
Start: 2020-09-16 | End: 2020-09-17 | Stop reason: HOSPADM

## 2020-09-15 RX ORDER — OXYCODONE HYDROCHLORIDE AND ACETAMINOPHEN 5; 325 MG/1; MG/1
1 TABLET ORAL PRN
Status: DISCONTINUED | OUTPATIENT
Start: 2020-09-15 | End: 2020-09-15 | Stop reason: HOSPADM

## 2020-09-15 RX ORDER — BUSPIRONE HYDROCHLORIDE 10 MG/1
20 TABLET ORAL 2 TIMES DAILY
Status: DISCONTINUED | OUTPATIENT
Start: 2020-09-15 | End: 2020-09-17 | Stop reason: HOSPADM

## 2020-09-15 RX ORDER — SODIUM CHLORIDE, SODIUM LACTATE, POTASSIUM CHLORIDE, CALCIUM CHLORIDE 600; 310; 30; 20 MG/100ML; MG/100ML; MG/100ML; MG/100ML
INJECTION, SOLUTION INTRAVENOUS CONTINUOUS PRN
Status: DISCONTINUED | OUTPATIENT
Start: 2020-09-15 | End: 2020-09-15 | Stop reason: SDUPTHER

## 2020-09-15 RX ORDER — ONDANSETRON 2 MG/ML
INJECTION INTRAMUSCULAR; INTRAVENOUS PRN
Status: DISCONTINUED | OUTPATIENT
Start: 2020-09-15 | End: 2020-09-15 | Stop reason: SDUPTHER

## 2020-09-15 RX ORDER — FENTANYL CITRATE 50 UG/ML
INJECTION, SOLUTION INTRAMUSCULAR; INTRAVENOUS PRN
Status: DISCONTINUED | OUTPATIENT
Start: 2020-09-15 | End: 2020-09-15 | Stop reason: SDUPTHER

## 2020-09-15 RX ORDER — SODIUM CHLORIDE 0.9 % (FLUSH) 0.9 %
10 SYRINGE (ML) INJECTION PRN
Status: DISCONTINUED | OUTPATIENT
Start: 2020-09-15 | End: 2020-09-17 | Stop reason: HOSPADM

## 2020-09-15 RX ORDER — CYCLOBENZAPRINE HCL 10 MG
10 TABLET ORAL 3 TIMES DAILY PRN
Status: DISCONTINUED | OUTPATIENT
Start: 2020-09-15 | End: 2020-09-16

## 2020-09-15 RX ORDER — LIDOCAINE HYDROCHLORIDE 10 MG/ML
1 INJECTION, SOLUTION EPIDURAL; INFILTRATION; INTRACAUDAL; PERINEURAL
Status: DISCONTINUED | OUTPATIENT
Start: 2020-09-15 | End: 2020-09-15 | Stop reason: HOSPADM

## 2020-09-15 RX ORDER — ONDANSETRON 2 MG/ML
4 INJECTION INTRAMUSCULAR; INTRAVENOUS
Status: DISCONTINUED | OUTPATIENT
Start: 2020-09-15 | End: 2020-09-15 | Stop reason: HOSPADM

## 2020-09-15 RX ADMIN — VANCOMYCIN HYDROCHLORIDE 1500 MG: 10 INJECTION, POWDER, LYOPHILIZED, FOR SOLUTION INTRAVENOUS at 09:40

## 2020-09-15 RX ADMIN — ACETAMINOPHEN 1000 MG: 500 TABLET ORAL at 21:44

## 2020-09-15 RX ADMIN — PHENYLEPHRINE HYDROCHLORIDE 40 MCG: 10 INJECTION, SOLUTION INTRAMUSCULAR; INTRAVENOUS; SUBCUTANEOUS at 12:38

## 2020-09-15 RX ADMIN — EPHEDRINE SULFATE 10 MG: 50 INJECTION, SOLUTION INTRAMUSCULAR; INTRAVENOUS; SUBCUTANEOUS at 11:59

## 2020-09-15 RX ADMIN — ATORVASTATIN CALCIUM 40 MG: 20 TABLET, FILM COATED ORAL at 21:45

## 2020-09-15 RX ADMIN — LIDOCAINE HYDROCHLORIDE 5 ML: 20 INJECTION, SOLUTION INFILTRATION; PERINEURAL at 11:35

## 2020-09-15 RX ADMIN — LIDOCAINE HYDROCHLORIDE 2.5 ML: 20 INJECTION, SOLUTION INFILTRATION; PERINEURAL at 12:59

## 2020-09-15 RX ADMIN — BUSPIRONE HYDROCHLORIDE 20 MG: 10 TABLET ORAL at 21:45

## 2020-09-15 RX ADMIN — Medication 1 CAPSULE: at 19:48

## 2020-09-15 RX ADMIN — DOCUSATE SODIUM 50 MG AND SENNOSIDES 8.6 MG 1 TABLET: 8.6; 5 TABLET, FILM COATED ORAL at 21:45

## 2020-09-15 RX ADMIN — MIDAZOLAM 2 MG: 1 INJECTION INTRAMUSCULAR; INTRAVENOUS at 09:02

## 2020-09-15 RX ADMIN — Medication 10 ML: at 21:45

## 2020-09-15 RX ADMIN — SODIUM CHLORIDE, POTASSIUM CHLORIDE, SODIUM LACTATE AND CALCIUM CHLORIDE: 600; 310; 30; 20 INJECTION, SOLUTION INTRAVENOUS at 09:39

## 2020-09-15 RX ADMIN — EPHEDRINE SULFATE 5 MG: 50 INJECTION, SOLUTION INTRAMUSCULAR; INTRAVENOUS; SUBCUTANEOUS at 12:03

## 2020-09-15 RX ADMIN — SODIUM CHLORIDE, SODIUM LACTATE, POTASSIUM CHLORIDE, AND CALCIUM CHLORIDE: 600; 310; 30; 20 INJECTION, SOLUTION INTRAVENOUS at 11:15

## 2020-09-15 RX ADMIN — EPHEDRINE SULFATE 10 MG: 50 INJECTION, SOLUTION INTRAMUSCULAR; INTRAVENOUS; SUBCUTANEOUS at 11:47

## 2020-09-15 RX ADMIN — SODIUM CHLORIDE: 9 INJECTION, SOLUTION INTRAVENOUS at 19:51

## 2020-09-15 RX ADMIN — PHENYLEPHRINE HYDROCHLORIDE 80 MCG: 10 INJECTION, SOLUTION INTRAMUSCULAR; INTRAVENOUS; SUBCUTANEOUS at 12:52

## 2020-09-15 RX ADMIN — FENTANYL CITRATE 100 MCG: 50 INJECTION INTRAMUSCULAR; INTRAVENOUS at 11:15

## 2020-09-15 RX ADMIN — PHENYLEPHRINE HYDROCHLORIDE 40 MCG: 10 INJECTION, SOLUTION INTRAMUSCULAR; INTRAVENOUS; SUBCUTANEOUS at 12:49

## 2020-09-15 RX ADMIN — PHENYLEPHRINE HYDROCHLORIDE 40 MCG: 10 INJECTION, SOLUTION INTRAMUSCULAR; INTRAVENOUS; SUBCUTANEOUS at 12:18

## 2020-09-15 RX ADMIN — ONDANSETRON 4 MG: 2 INJECTION INTRAMUSCULAR; INTRAVENOUS at 13:53

## 2020-09-15 RX ADMIN — PHENYLEPHRINE HYDROCHLORIDE 40 MCG: 10 INJECTION, SOLUTION INTRAMUSCULAR; INTRAVENOUS; SUBCUTANEOUS at 12:08

## 2020-09-15 RX ADMIN — PHENYLEPHRINE HYDROCHLORIDE 40 MCG: 10 INJECTION, SOLUTION INTRAMUSCULAR; INTRAVENOUS; SUBCUTANEOUS at 12:40

## 2020-09-15 RX ADMIN — LOSARTAN POTASSIUM 50 MG: 50 TABLET ORAL at 19:47

## 2020-09-15 RX ADMIN — HYDROMORPHONE HYDROCHLORIDE 0.25 MG: 1 INJECTION, SOLUTION INTRAMUSCULAR; INTRAVENOUS; SUBCUTANEOUS at 14:49

## 2020-09-15 RX ADMIN — PHENYLEPHRINE HYDROCHLORIDE 40 MCG: 10 INJECTION, SOLUTION INTRAMUSCULAR; INTRAVENOUS; SUBCUTANEOUS at 12:47

## 2020-09-15 RX ADMIN — EPHEDRINE SULFATE 15 MG: 50 INJECTION, SOLUTION INTRAMUSCULAR; INTRAVENOUS; SUBCUTANEOUS at 11:42

## 2020-09-15 RX ADMIN — LIDOCAINE HYDROCHLORIDE 5 ML: 20 INJECTION, SOLUTION INFILTRATION; PERINEURAL at 11:15

## 2020-09-15 RX ADMIN — PROPOFOL 50 MCG/KG/MIN: 10 INJECTION, EMULSION INTRAVENOUS at 11:35

## 2020-09-15 RX ADMIN — LIDOCAINE HYDROCHLORIDE 2.5 ML: 20 INJECTION, SOLUTION INFILTRATION; PERINEURAL at 12:14

## 2020-09-15 RX ADMIN — PHENYLEPHRINE HYDROCHLORIDE 40 MCG: 10 INJECTION, SOLUTION INTRAMUSCULAR; INTRAVENOUS; SUBCUTANEOUS at 12:12

## 2020-09-15 RX ADMIN — CEFAZOLIN 2 G: 10 INJECTION, POWDER, FOR SOLUTION INTRAVENOUS at 11:37

## 2020-09-15 RX ADMIN — CEFAZOLIN 2 G: 10 INJECTION, POWDER, FOR SOLUTION INTRAVENOUS at 21:44

## 2020-09-15 RX ADMIN — SODIUM CHLORIDE, POTASSIUM CHLORIDE, SODIUM LACTATE AND CALCIUM CHLORIDE: 600; 310; 30; 20 INJECTION, SOLUTION INTRAVENOUS at 08:59

## 2020-09-15 RX ADMIN — FENTANYL CITRATE 50 MCG: 50 INJECTION INTRAMUSCULAR; INTRAVENOUS at 13:15

## 2020-09-15 RX ADMIN — SODIUM CHLORIDE, SODIUM LACTATE, POTASSIUM CHLORIDE, AND CALCIUM CHLORIDE: 600; 310; 30; 20 INJECTION, SOLUTION INTRAVENOUS at 11:50

## 2020-09-15 RX ADMIN — EPHEDRINE SULFATE 10 MG: 50 INJECTION, SOLUTION INTRAMUSCULAR; INTRAVENOUS; SUBCUTANEOUS at 11:54

## 2020-09-15 RX ADMIN — HYDROMORPHONE HYDROCHLORIDE 0.5 MG: 1 INJECTION, SOLUTION INTRAMUSCULAR; INTRAVENOUS; SUBCUTANEOUS at 18:36

## 2020-09-15 ASSESSMENT — PULMONARY FUNCTION TESTS
PIF_VALUE: 0
PIF_VALUE: 2
PIF_VALUE: 0
PIF_VALUE: 23
PIF_VALUE: 0
PIF_VALUE: 2
PIF_VALUE: 2
PIF_VALUE: 0
PIF_VALUE: 2
PIF_VALUE: 0
PIF_VALUE: 2
PIF_VALUE: 2
PIF_VALUE: 0
PIF_VALUE: 2
PIF_VALUE: 0
PIF_VALUE: 0
PIF_VALUE: 2
PIF_VALUE: 0
PIF_VALUE: 2
PIF_VALUE: 0
PIF_VALUE: 2
PIF_VALUE: 0
PIF_VALUE: 2
PIF_VALUE: 0
PIF_VALUE: 2
PIF_VALUE: 0
PIF_VALUE: 2
PIF_VALUE: 2
PIF_VALUE: 0
PIF_VALUE: 2
PIF_VALUE: 0
PIF_VALUE: 0
PIF_VALUE: 2
PIF_VALUE: 0
PIF_VALUE: 2
PIF_VALUE: 0
PIF_VALUE: 2
PIF_VALUE: 2
PIF_VALUE: 0
PIF_VALUE: 2
PIF_VALUE: 0
PIF_VALUE: 0
PIF_VALUE: 2
PIF_VALUE: 0
PIF_VALUE: 2
PIF_VALUE: 0
PIF_VALUE: 0
PIF_VALUE: 1
PIF_VALUE: 0
PIF_VALUE: 7
PIF_VALUE: 0
PIF_VALUE: 2
PIF_VALUE: 2
PIF_VALUE: 0
PIF_VALUE: 0
PIF_VALUE: 2
PIF_VALUE: 0
PIF_VALUE: 2
PIF_VALUE: 2
PIF_VALUE: 0
PIF_VALUE: 0
PIF_VALUE: 24
PIF_VALUE: 0
PIF_VALUE: 2
PIF_VALUE: 2
PIF_VALUE: 0
PIF_VALUE: 2
PIF_VALUE: 0
PIF_VALUE: 2
PIF_VALUE: 0
PIF_VALUE: 2
PIF_VALUE: 0
PIF_VALUE: 2
PIF_VALUE: 0

## 2020-09-15 ASSESSMENT — PAIN DESCRIPTION - PAIN TYPE
TYPE: ACUTE PAIN;SURGICAL PAIN
TYPE: ACUTE PAIN

## 2020-09-15 ASSESSMENT — PAIN DESCRIPTION - FREQUENCY
FREQUENCY: CONTINUOUS
FREQUENCY: CONTINUOUS

## 2020-09-15 ASSESSMENT — PAIN SCALES - GENERAL
PAINLEVEL_OUTOF10: 5
PAINLEVEL_OUTOF10: 5
PAINLEVEL_OUTOF10: 7
PAINLEVEL_OUTOF10: 6

## 2020-09-15 ASSESSMENT — PAIN DESCRIPTION - LOCATION
LOCATION: HIP
LOCATION: GROIN;HIP;KNEE

## 2020-09-15 ASSESSMENT — PAIN DESCRIPTION - DESCRIPTORS
DESCRIPTORS: ACHING;CONSTANT;SORE
DESCRIPTORS: ACHING;DISCOMFORT
DESCRIPTORS: ACHING

## 2020-09-15 ASSESSMENT — PAIN DESCRIPTION - ONSET
ONSET: ON-GOING
ONSET: ON-GOING

## 2020-09-15 ASSESSMENT — PAIN DESCRIPTION - ORIENTATION
ORIENTATION: RIGHT
ORIENTATION: RIGHT

## 2020-09-15 ASSESSMENT — PAIN - FUNCTIONAL ASSESSMENT
PAIN_FUNCTIONAL_ASSESSMENT: PREVENTS OR INTERFERES SOME ACTIVE ACTIVITIES AND ADLS
PAIN_FUNCTIONAL_ASSESSMENT: PREVENTS OR INTERFERES SOME ACTIVE ACTIVITIES AND ADLS
PAIN_FUNCTIONAL_ASSESSMENT: 0-10

## 2020-09-15 ASSESSMENT — LIFESTYLE VARIABLES: SMOKING_STATUS: 0

## 2020-09-15 ASSESSMENT — PAIN DESCRIPTION - PROGRESSION
CLINICAL_PROGRESSION: NOT CHANGED
CLINICAL_PROGRESSION: GRADUALLY WORSENING

## 2020-09-15 NOTE — H&P
Mildred Carmichael    [id number]    [facility] Same Day Surgery Update H & P  Department of General Surgery   Surgical Service   Pre-operative History and Physical  Last H & P within the last 30 days. DIAGNOSIS:   Primary osteoarthritis of right hip [M16.11]    Procedure(s):  RIGHT DIRECT ANTERIOR APPROACH TOTAL HIP ARTHROPLASTY     HISTORY OF PRESENT ILLNESS:   Patient has chronic pain and limited range of motion of his right hip. The symptoms have been recalcitrant to conservative treatment and the patient presents today for the above procedure. Covid 19:  Patient denies fever, chills, cough or known exposure to Covid-19.   Patient reports they have been quarantined at home since Covid-19 test.      Past Medical History:        Diagnosis Date    Allergic rhinitis     Anxiety     Arthritis     Bipolar Disorder     Diabetes mellitus ([address]     GERD (gastroesophageal reflux disease)     Hearing loss     HYPERCHOLESTERAEMIA     Hypertension     Hypothyroid     Kidney disease     OCD (obsessive compulsive disorder)     PTSD     Screening PSA (prostate specific antigen) 1.3.11    result - 2.91    Sleep apnea      Past Surgical History:        Procedure Laterality Date    COLONOSCOPY  4.27.05    normal, Dr. Kang Begum  5/16/14    normal, rhoades    EXTERNAL AUDITORY CANAL RECONSTRUCTION      left 1980 and 1995    LYMPH NODE BIOPSY  4/27/2010    removed groin, benign, dr Daniels [place] ENDOSCOPY  5/16/14    Dorie moore, check in one year    UPPER GASTROINTESTINAL ENDOSCOPY  6/1/15    Dorie Cameron, check one year    UPPER GASTROINTESTINAL ENDOSCOPY  06/13/2018    oscar Cameron, check in 3 years     Past Social History:  Social History     Socioeconomic History    Marital status:      Spouse name: None    Number of children: None    Years of education: None    Highest education level: None Occupational History    Occupation: Retired   Social Needs    Financial resource strain: None    Food insecurity     Worry: None     Inability: None    Transportation needs     Medical: None     Non-medical: None   Tobacco Use    Smoking status: Never Smoker    Smokeless tobacco: Never Used    Tobacco comment: occ cigar as teen    Substance and Sexual Activity    Alcohol use: No     Alcohol/week: 0.0 standard drinks    Drug use: No    Sexual activity: None   Lifestyle    Physical activity     Days per week: None     Minutes per session: None    Stress: None   Relationships    Social connections     Talks on phone: None     Gets together: None     Attends [employer] service: None     Active member of club or organization: None     Attends meetings of clubs or organizations: None     Relationship status: None    Intimate partner violence     Fear of current or ex partner: None     Emotionally abused: None     Physically abused: None     Forced sexual activity: None   Other Topics Concern    None   Social History Narrative    None         Medications Prior to Admission:      Prior to Admission medications    Medication Sig Start Date End Date Taking? Authorizing Provider   levothyroxine (SYNTHROID) 150 MCG tablet TAKE ONE TABLET BY MOUTH DAILY 9/2/20  Yes Harris Banuelos MD   losartan (COZAAR) 50 MG tablet Take 1 tablet by mouth daily 6/22/20  Yes Harris Banuelos MD   Probiotic Product (ALIGN PO) Take by mouth   Yes Historical Provider, MD   simvastatin (ZOCOR) 40 MG tablet Take 1 tablet by mouth nightly 5/12/20  Yes Harris Banuelos MD   fluvoxaMINE (LUVOX) 100 MG tablet  11/22/18  Yes Historical Provider, MD   pantoprazole (PROTONIX) 20 MG tablet Take 20 mg by mouth daily   Yes Historical Provider, MD   busPIRone (BUSPAR) 10 MG tablet Take 1 tablet by mouth 2 times daily. Patient taking differently: 20 mg 2 times daily  1/22/13  Yes Harris Banuelos MD   desvenlafaxine (PRISTIQ) 50 MG TB24 Take 50 mg by mouth daily. Yes Historical Provider, MD   cyclobenzaprine (FLEXERIL) 10 MG tablet as needed 12/12/19   Historical Provider, MD   ONE TOUCH ULTRA TEST strip TEST ONCE DAILY 9/14/13   Casimiro Carvajal MD   [address] [place] LANCETS MISC USE AS DIRECTED 9/5/13   Casimiro Carvajal MD         Allergies:  Codeine and Nsaids    PHYSICAL EXAM:      BP (!) 177/77   Pulse (!) 47   Temp 98.2 °F (36.8 °C) (Oral)   Resp 19   Ht 5' 3\" (1.6 m)   Wt 206 lb (93.4 kg)   SpO2 96%   BMI 36.49 kg/m²      Airway:  Airway patent with no audible stridor    Heart:  Regular rate and rhythm, No murmur noted    Lungs:  No increased work of breathing, good air exchange, clear to auscultation bilaterally, no crackles or wheezing    Abdomen:  Soft, non-distended, non-tender, normal active bowel sounds, no masses palpated    ASSESSMENT AND PLAN    Patient is a 72 y.o. male with above specified procedure planned. 1.  Patient seen and focused exam done today- no new changes since last physical exam on 9-4-2020    2. Access to ancillary services are available per request of the provider.     Tim [place], [place]     9/15/2020

## 2020-09-15 NOTE — PROGRESS NOTES
Wife called and updated hourly.  Patient still very sleepy but arousable to participate in assessments and answer questions appropriately

## 2020-09-15 NOTE — PROGRESS NOTES
PACU Transfer Note    Current Allergies: Codeine and Nsaids    Pt meets criteria as per Stephanie Score and ASPAN Standards to transfer to next phase of care. Recent Labs     09/15/20  0858 09/15/20  1418   POCGLU 106* 150*         Vitals:    09/15/20 1700   BP: 120/66   Pulse: (!) 49   Resp: 20   Temp: 97 °F (36.1 °C)   SpO2: 100%     BP within   20% of pt's admitting BP as per STEPHANIE SCORE    SpO2: 100 %    O2 Flow Rate (L/min): 3 L/min      Intake/Output Summary (Last 24 hours) at 9/15/2020 1746  Last data filed at 9/15/2020 1742  Gross per 24 hour   Intake 2417 ml   Output --   Net 2417 ml       Pain assessment:  level of pain (1-10, 10 severe),     Pain Level: 6           Handoff report given at bedside.    Family updated and directed to pt room      9/15/2020 5:46 PM

## 2020-09-15 NOTE — ANESTHESIA POSTPROCEDURE EVALUATION
Department of Anesthesiology  Postprocedure Note    Patient: Jeannette Roche  MRN: [id number]  YOB: 1955  Date of evaluation: 9/15/2020  Time:  2:32 PM     Procedure Summary     Date:  09/15/20 Room / Location:  [address]    Anesthesia Start:  1115 Anesthesia Stop:  1403    Procedure:  RIGHT DIRECT ANTERIOR APPROACH TOTAL HIP ARTHROPLASTY (Right Hip) Diagnosis:       Primary osteoarthritis of right hip      (Primary osteoarthritis of right hip [M16.11])    Surgeon:  Cecile Blum MD Responsible Provider:  Marianna Ruff MD    Anesthesia Type:  regional, MAC ASA Status:  3          Anesthesia Type: regional, MAC    Marisabel Phase I: Marisabel Score: 7    Marisabel Phase II:      Last vitals: Reviewed and per EMR flowsheets.        Anesthesia Post Evaluation    Patient location during evaluation: PACU  Level of consciousness: awake and alert  Airway patency: patent  Nausea & Vomiting: no vomiting  Complications: no  Cardiovascular status: blood pressure returned to baseline  Respiratory status: acceptable  Hydration status: euvolemic

## 2020-09-15 NOTE — PROGRESS NOTES
Pt/ot at bedside, patient still requiring oxygen and extremely lethargic.  Unable to participate at this time

## 2020-09-15 NOTE — OP NOTE
PREOPERATIVE DIAGNOSIS: Right hip arthritis. POSTOPERATIVE DIAGNOSIS: Right hip arthritis. OPERATION PERFORMED: Right total hip arthroplasty, direct anterior supine under  fluoroscopic guidance. ATTENDING SURGEON: Michi Stevenson MD    FIRST SURGICAL ASSISTANT: Shai Gamez, [facility]. The Physician Assistant was necessary to perform the surgery today by assisting with application of implants and manipulation of the extremity. This help was not otherwise available in the operating room today. ANESTHESIA: Epidural plus ortho cocktail mixture. ESTIMATED BLOOD LOSS: 500 mL. INTRAVENOUS FLUIDS: 1600 mL crystalloid. URINE OUTPUT: 0.    ANTIBIOTICS: 02G Cefazolin + 1G Vancomycin     COMPLICATIONS: None. IMPLANTS:   1. [place] Trident II 50 mm outer diameter acetabular shell. 2. X3 polyethylene acetabular liner, 36 mm inner diameter neutral.  3. Smith [employer] Nephew Polar femoral stem 2 standard with collar. 4. Oxinium femoral head 36 mm outer diameter +8 offset     OPERATIVE INDICATIONS: This is a patient with longstanding  hip pain. They had radiographic evidence of hip arthritis. They were initially managed with nonoperative measures. After failing non-operative management, total hip arthroplasty was discussed with the patient. We discussed the risk adherent  to the anterior approach including but not limited to injury from on the [place]  table, anterior rather than posterior dislocation, damage to the anterior  structures. General risk of total hip arthroplasty including to dislocation,  neurovascular damage, infection, need for further surgery or even loss of life  or limb were discussed. Despite these risks and others, the patient elected  to proceed. OPERATIVE DETAILS: The patient was greeted in the preoperative holding area. The operative hip was marked with a marker. They were brought to the operating room  where general anesthesia was obtained.  They were placed on the [facility] table with feet in the boots and appropriately positioned against the post. The arms  were placed on the arm boards. All bony prominences were well padded. The anterior portion of the hip was prepped and draped in normal standard sterile surgical fashion. A final timeout was performed, verifying correct patient, operative site and  operative plan. The patient did receive antibiotics prior to surgical incision. They also received 1 g of tranexamic acid prior to surgical incision. I began by making an incision just lateral and distal to the anterior superior  iliac spine heading just laterally towards the lateral patellar. This was made through the skin through the subcutaneous  tissue, identifying the fascia overlying the tensor fascia niecy. The fascia  was divided in line with its fibers. I placed an Allis clamp on the fascia  anteriorly and dissected over top of the tensor fascia niecy, meeting the  sartorial fascia and then diving over top of the femoral neck. A cobra  retractor was placed medially over the neck and then laterally over the neck. I brought x-ray in, verified my position. I then divided through the fascia  overlying the anterior hip, coagulating all bleeders including the circumflex  vessel. I made a capsulotomy in a T-type fashion and tagged them with #1  Vicryl for retraction and later closure. I placed my cobra retractors  intraarticularly, then placed a Bovie caesar on the femoral neck for my planned  femoral osteotomy, placed the saw in place at the Bovie caesar, and brought  fluoroscopy in and verified my position. I made my osteotomy and then using a  corkscrew, was able to remove the femoral head. At this point, I externally  rotated the femur, dropping it into extension and adduction, and performed a  release of pubofemoral ligament inferiorly down to the lesser trochanter.  I  brought it back up into a neutral position and I placed 2 retractors  anteriorly and posteriorly for acetabular reaming. Under direct fluoroscopic  guidance, I reamed sequentially to a size 50 and then opened a 50 mm shell and impacted it into place. I grabbed this with a Kocher clamp and was able to clearly  demonstrate that it had excellent fixation and I did not feel it needed screw  fixation. A 36 mm inner diameter liner was opened and impacted into place. I  was pleased with this and I turned my attention to the femur. I placed the hook for the [place] table just superior to the vastus lateralis underneath the  femur, and then elevated it and clamped it into the table. A retractor was  placed over the medial femur, the leg was brought into once again extension  and adduction, and full external rotation to approximately 110 degrees. I was  able to expose the femur. I then released the capsule superiorly in order to  get full femoral exposure, I used the box osteotome, followed by canal finder  and placed a broach into the femur and brought the femur back  up removing my retractors and took a fluoroscopic image to verify that I was  within the canal. I was pleased with this. I reset my exposure and then I  broached with sequentially to a size 2 stem. I calcar planed and had a nice flat cut. I then came down, I trialed this under fluoroscopic guidance. I had good leg length and I opened  the 2 stem. I reset my retraction and my position of my femur. I impacted  the stem to the level of my femoral neck osteotomy. After trialing once again I opened the size 2 STD head. This was impacted onto a dry Stubbs  taper. I reduced the hip, took fluoroscopic images of the bilateral hips and  the pelvis to verify my leg length. I was pleased with leg length and offset. I dropped the hip into extension and external rotation and it did not  dislocate on the table. I was pleased with this and brought it back up into  neutral, took all traction off.     The wound was soaked in a dilute betadine solution followed by pulsatile irrigation with 3 L of sterile saline with bacitracin. I then turned my attention to closure of the wound. The fascia of the TFL was closed with interrupted 0 vicryl oversewn with a running #2 stratafix suture. A local anesthetic mixture was injected into the muscle, deep and subcutaneous tissue followed by 0 vicryl in the subcutaneous tissue. 2-0 vicryl was placed in a buried interrupted fashion followed by a running subcuticular 4-0 monocryl in the subdermal layer. Surgical adhesive was placed on the incision. A sterile silver impregnated occlusive was placed over the incision. The patient was extubated, transferred to a hospital bed and transported to the post-operative anesthesia care unit in stable condition. All sponge and needle counts were correct x 2.

## 2020-09-15 NOTE — PROGRESS NOTES
Patient to PACU 16. S/P RIGHT DIRECT ANTERIOR APPROACH TOTAL HIP ARTHROPLASTY, report received from CRNA, reported hypertension intra op, patients blood glucose checked, 150. Patient on 5L of o2 at this time. Patient agitated, turning over, unable to lie still. Patient still lethargic, unable to answer questions. Warm blankets applied.

## 2020-09-15 NOTE — ANESTHESIA PRE PROCEDURE
Department of Anesthesiology  Preprocedure Note       Name:  Kristal Cooper   Age:  72 y.o.  :  1955                                          MRN:  [id number]         Date:  9/15/2020      Surgeon: Quang Parks):  Scarlett Doran MD    Procedure: Procedure(s):  RIGHT DIRECT ANTERIOR APPROACH TOTAL HIP ARTHROPLASTY    Medications prior to admission:   Prior to Admission medications    Medication Sig Start Date End Date Taking? Authorizing Provider   levothyroxine (SYNTHROID) 150 MCG tablet TAKE ONE TABLET BY MOUTH DAILY 20  Yes Alex Velasquez MD   losartan (COZAAR) 50 MG tablet Take 1 tablet by mouth daily 20  Yes Alex Velasquez MD   Probiotic Product (ALIGN PO) Take by mouth   Yes Historical Provider, MD   simvastatin (ZOCOR) 40 MG tablet Take 1 tablet by mouth nightly 20  Yes Alex Velasquez MD   fluvoxaMINE (LUVOX) 100 MG tablet  18  Yes Historical Provider, MD   pantoprazole (PROTONIX) 20 MG tablet Take 20 mg by mouth daily   Yes Historical Provider, MD   busPIRone (BUSPAR) 10 MG tablet Take 1 tablet by mouth 2 times daily. Patient taking differently: 20 mg 2 times daily  13  Yes Alex Velasquez MD   desvenlafaxine (PRISTIQ) 50 MG TB24 Take 50 mg by mouth daily.      Yes Historical Provider, MD   cyclobenzaprine (FLEXERIL) 10 MG tablet as needed 19   Historical Provider, MD   ONE TOUCH ULTRA TEST strip TEST ONCE DAILY 13   Alex Velasquez MD   [place] LANCETS MISC USE AS DIRECTED 13   Alex Velasquez MD       Current medications:    Current Facility-Administered Medications   Medication Dose Route Frequency Provider Last Rate Last Dose    ceFAZolin (ANCEF) 2 g in dextrose 5 % 50 mL IVPB  2 g Intravenous Once Scarlett Doran MD        vancomycin (VANCOCIN) 1,500 mg in dextrose 5 % 250 mL IVPB  15 mg/kg Intravenous Once Scarlett Doran MD        tranexamic acid (CYKLOKAPRON) 1,000 mg in sodium chloride 0.9 % 50 mL IVPB  1,000 mg Intravenous Once MD Ryan Chambers ortho mix (with morphine) injection   Injection On Call Cecile Blum MD        lactated ringers infusion   Intravenous Continuous Ryan Barcenas  mL/hr at 09/15/20 0859      sodium chloride flush 0.9 % injection 10 mL  10 mL Intravenous 2 times per day Ryan Barcenas MD        sodium chloride flush 0.9 % injection 10 mL  10 mL Intravenous PRN Ryan Barcenas MD        lidocaine PF 1 % injection 1 mL  1 mL Intradermal Once PRN Ryan Barcenas MD        midazolam (VERSED) injection 2 mg  2 mg Intravenous Once Marianna Ruff MD           Allergies:     Allergies   Allergen Reactions    Codeine Nausea Only    Nsaids      Kidney issues       Problem List:    Patient Active Problem List   Diagnosis Code    Hypothyroidism E03.9    Esophageal reflux K21.9    Allergic rhinitis J30.9    Bipolar affective disorder ([address] F31.9    Palpitations R00.2    Bradycardia R00.1    Hypertension I10    Hyperlipidemia E78.5    Osteoarthritis, hip, bilateral M16.0    Paresthesias R20.2    Chronic neck pain M54.2, G89.29    Arthritis of right shoulder region M19.011    Rotator cuff tendonitis M75.80    Type 2 diabetes mellitus without complication, without long-term current use of insulin ([facility]) E11.9    Morbidly obese ([facility]) E66.01       Past Medical History:        Diagnosis Date    Allergic rhinitis     Anxiety     Arthritis     Bipolar Disorder     Diabetes mellitus ([facility])     GERD (gastroesophageal reflux disease)     Hearing loss     HYPERCHOLESTERAEMIA     Hypertension     Hypothyroid     Kidney disease     Nephritis     OCD (obsessive compulsive disorder)     PTSD     Screening PSA (prostate specific antigen) 1.3.11    result - 2.91    Sleep apnea        Past Surgical History:        Procedure Laterality Date    COLONOSCOPY  4.27.05    normal, Dr. Rob Royal COLONOSCOPY  5/16/14    Selam piña    EXTERNAL AUDITORY CANAL RECONSTRUCTION      left 1980 and 1995    LYMPH NODE 09/04/2020    LABGLOM >60 09/04/2020    GLUCOSE 105 09/04/2020    PROT 7.0 09/04/2020    PROT 7.0 02/05/2013    CALCIUM 9.7 09/04/2020    BILITOT 0.4 09/04/2020    ALKPHOS 51 09/04/2020    AST 28 09/04/2020    ALT 42 09/04/2020       POC Tests:   Recent Labs     09/15/20  0858   POCGLU 106*       Coags:   Lab Results   Component Value Date    PROTIME 12.1 09/04/2020    INR 1.04 09/04/2020    APTT 33.7 09/04/2020       HCG (If Applicable): No results found for: PREGTESTUR, PREGSERUM, HCG, HCGQUANT     ABGs: No results found for: PHART, PO2ART, SNE9PFI, CZS3PWB, BEART, E3TQNSTB     Type & Screen (If Applicable):  No results found for: LABABO, LABRH    Drug/Infectious Status (If Applicable):  No results found for: HIV, HEPCAB    COVID-19 Screening (If Applicable):   Lab Results   Component Value Date    COVID19 NOT DETECTED 09/09/2020         Anesthesia Evaluation  Patient summary reviewed  Airway: Mallampati: II  TM distance: >3 FB   Neck ROM: full  Mouth opening: > = 3 FB Dental:      Comment: Has a dental plate that he does not want to remove    Pulmonary: breath sounds clear to auscultation  (+) sleep apnea: on CPAP,      (-) COPD, asthma and not a current smoker                           Cardiovascular:    (+) hypertension:, hyperlipidemia    (-) pacemaker, valvular problems/murmurs, past MI, CAD, CABG/stent, dysrhythmias,  angina,  CHF, orthopnea and PND      Rhythm: regular  Rate: normal           Beta Blocker:  Not on Beta Blocker         Neuro/Psych:   (+) psychiatric history:   (-) seizures, TIA and CVA            ROS comment: Bipolar  OCD  PTSD GI/Hepatic/Renal:   (+) GERD:, morbid obesity     (-) hiatal hernia, hepatitis and liver disease       Endo/Other:    (+) DiabetesType II DM, , hypothyroidism::., .    (-) blood dyscrasia, arthritis, no electrolyte abnormalities               Abdominal:           Vascular:     - DVT and PE.                                     Anesthesia Plan      regional and MAC ASA 3       Induction: intravenous. Anesthetic plan and risks discussed with patient. Plan discussed with CRNA.                   Perla Linconl MD   9/15/2020

## 2020-09-15 NOTE — PROGRESS NOTES
4 Eyes Admission Assessment     I agree as the admission nurse that 2 RN's have performed a thorough Head to Toe Skin Assessment on the patient. ALL assessment sites listed below have been assessed on admission. Areas assessed by both nurses:   [x]   Head, Face, and Ears   [x]   Shoulders, Back, and Chest  [x]   Arms, Elbows, and Hands   [x]   Coccyx, Sacrum, and Ischium  [x]   Legs, Feet, and Heels        Does the Patient have Skin Breakdown? Redness bilateral heels; strawberry dots in groin, blanchable.     Francisco Javier Prevention initiated: NO  Wound Care Orders initiated: NO      [facility] nurse consulted for Pressure Injury (Stage 3,4, Unstageable, DTI, NWPT, and Complex wounds) or Francisco Javier score 18 or lower:  NO      Nurse 1 eSignature: Electronically signed by Gerardo Girard RN on 9/15/20 at 6:51 PM EDT    SHARE this note so that the co-signing nurse is able to place an eSignature    Nurse 2 eSignature: Electronically signed by Joyce Agosto RN on 9/15/20 at 6:53 PM EDT

## 2020-09-15 NOTE — PROGRESS NOTES
Wife called and updated on patient status, informed that he will need to stay at the hospital overnight. Upon assessment patient states \"it hurts\" and then immediately falls back to sleep.

## 2020-09-16 LAB
GLUCOSE BLD-MCNC: 151 MG/DL (ref 70–99)
HCT VFR BLD CALC: 30.3 % (ref 40.5–52.5)
HEMOGLOBIN: 10.2 G/DL (ref 13.5–17.5)
PERFORMED ON: ABNORMAL

## 2020-09-16 PROCEDURE — 85014 HEMATOCRIT: CPT

## 2020-09-16 PROCEDURE — 6360000002 HC RX W HCPCS: Performed by: ORTHOPAEDIC SURGERY

## 2020-09-16 PROCEDURE — 1200000000 HC SEMI PRIVATE

## 2020-09-16 PROCEDURE — 97535 SELF CARE MNGMENT TRAINING: CPT

## 2020-09-16 PROCEDURE — 97166 OT EVAL MOD COMPLEX 45 MIN: CPT

## 2020-09-16 PROCEDURE — 97110 THERAPEUTIC EXERCISES: CPT

## 2020-09-16 PROCEDURE — 36415 COLL VENOUS BLD VENIPUNCTURE: CPT

## 2020-09-16 PROCEDURE — 96374 THER/PROPH/DIAG INJ IV PUSH: CPT

## 2020-09-16 PROCEDURE — 6370000000 HC RX 637 (ALT 250 FOR IP): Performed by: ORTHOPAEDIC SURGERY

## 2020-09-16 PROCEDURE — 97116 GAIT TRAINING THERAPY: CPT

## 2020-09-16 PROCEDURE — 2580000003 HC RX 258: Performed by: ORTHOPAEDIC SURGERY

## 2020-09-16 PROCEDURE — 97530 THERAPEUTIC ACTIVITIES: CPT

## 2020-09-16 PROCEDURE — 6370000000 HC RX 637 (ALT 250 FOR IP): Performed by: PHYSICIAN ASSISTANT

## 2020-09-16 PROCEDURE — 85018 HEMOGLOBIN: CPT

## 2020-09-16 PROCEDURE — 97162 PT EVAL MOD COMPLEX 30 MIN: CPT

## 2020-09-16 RX ORDER — CYCLOBENZAPRINE HCL 10 MG
5 TABLET ORAL 3 TIMES DAILY PRN
Status: DISCONTINUED | OUTPATIENT
Start: 2020-09-16 | End: 2020-09-17 | Stop reason: HOSPADM

## 2020-09-16 RX ORDER — OXYCODONE HYDROCHLORIDE 5 MG/1
5 TABLET ORAL EVERY 4 HOURS PRN
Status: DISCONTINUED | OUTPATIENT
Start: 2020-09-16 | End: 2020-09-17

## 2020-09-16 RX ORDER — OXYCODONE HYDROCHLORIDE 5 MG/1
10 TABLET ORAL EVERY 4 HOURS PRN
Status: DISCONTINUED | OUTPATIENT
Start: 2020-09-16 | End: 2020-09-17

## 2020-09-16 RX ADMIN — ONDANSETRON 4 MG: 2 INJECTION INTRAMUSCULAR; INTRAVENOUS at 09:27

## 2020-09-16 RX ADMIN — ACETAMINOPHEN 1000 MG: 500 TABLET ORAL at 14:10

## 2020-09-16 RX ADMIN — ASPIRIN 81 MG: 81 TABLET, COATED ORAL at 09:35

## 2020-09-16 RX ADMIN — Medication 10 ML: at 21:04

## 2020-09-16 RX ADMIN — ACETAMINOPHEN 1000 MG: 500 TABLET ORAL at 06:15

## 2020-09-16 RX ADMIN — ATORVASTATIN CALCIUM 40 MG: 20 TABLET, FILM COATED ORAL at 21:04

## 2020-09-16 RX ADMIN — BUSPIRONE HYDROCHLORIDE 20 MG: 10 TABLET ORAL at 21:03

## 2020-09-16 RX ADMIN — BUSPIRONE HYDROCHLORIDE 20 MG: 10 TABLET ORAL at 09:35

## 2020-09-16 RX ADMIN — OXYCODONE 10 MG: 5 TABLET ORAL at 21:03

## 2020-09-16 RX ADMIN — LOSARTAN POTASSIUM 50 MG: 50 TABLET ORAL at 09:34

## 2020-09-16 RX ADMIN — DOCUSATE SODIUM 50 MG AND SENNOSIDES 8.6 MG 1 TABLET: 8.6; 5 TABLET, FILM COATED ORAL at 21:04

## 2020-09-16 RX ADMIN — SODIUM CHLORIDE: 9 INJECTION, SOLUTION INTRAVENOUS at 22:45

## 2020-09-16 RX ADMIN — DOCUSATE SODIUM 50 MG AND SENNOSIDES 8.6 MG 1 TABLET: 8.6; 5 TABLET, FILM COATED ORAL at 09:34

## 2020-09-16 RX ADMIN — Medication 1 CAPSULE: at 09:35

## 2020-09-16 RX ADMIN — ACETAMINOPHEN 1000 MG: 500 TABLET ORAL at 21:03

## 2020-09-16 RX ADMIN — OXYCODONE 10 MG: 5 TABLET ORAL at 14:10

## 2020-09-16 RX ADMIN — OXYCODONE HYDROCHLORIDE 15 MG: 15 TABLET ORAL at 02:49

## 2020-09-16 RX ADMIN — SODIUM CHLORIDE: 9 INJECTION, SOLUTION INTRAVENOUS at 07:54

## 2020-09-16 RX ADMIN — PANTOPRAZOLE SODIUM 20 MG: 20 TABLET, DELAYED RELEASE ORAL at 09:35

## 2020-09-16 RX ADMIN — LEVOTHYROXINE SODIUM 150 MCG: 150 TABLET ORAL at 06:15

## 2020-09-16 RX ADMIN — SODIUM CHLORIDE: 9 INJECTION, SOLUTION INTRAVENOUS at 15:10

## 2020-09-16 RX ADMIN — CEFAZOLIN 2 G: 10 INJECTION, POWDER, FOR SOLUTION INTRAVENOUS at 03:19

## 2020-09-16 ASSESSMENT — PAIN DESCRIPTION - PROGRESSION
CLINICAL_PROGRESSION: NOT CHANGED
CLINICAL_PROGRESSION: GRADUALLY WORSENING

## 2020-09-16 ASSESSMENT — PAIN DESCRIPTION - FREQUENCY
FREQUENCY: CONTINUOUS

## 2020-09-16 ASSESSMENT — PAIN DESCRIPTION - ONSET
ONSET: ON-GOING

## 2020-09-16 ASSESSMENT — PAIN DESCRIPTION - PAIN TYPE
TYPE: SURGICAL PAIN

## 2020-09-16 ASSESSMENT — PAIN SCALES - GENERAL
PAINLEVEL_OUTOF10: 5
PAINLEVEL_OUTOF10: 5
PAINLEVEL_OUTOF10: 10
PAINLEVEL_OUTOF10: 9
PAINLEVEL_OUTOF10: 8

## 2020-09-16 ASSESSMENT — PAIN - FUNCTIONAL ASSESSMENT
PAIN_FUNCTIONAL_ASSESSMENT: PREVENTS OR INTERFERES SOME ACTIVE ACTIVITIES AND ADLS

## 2020-09-16 ASSESSMENT — PAIN DESCRIPTION - DESCRIPTORS
DESCRIPTORS: ACHING;DISCOMFORT

## 2020-09-16 ASSESSMENT — PAIN DESCRIPTION - ORIENTATION
ORIENTATION: RIGHT

## 2020-09-16 ASSESSMENT — PAIN DESCRIPTION - LOCATION
LOCATION: HIP
LOCATION: HIP;BACK

## 2020-09-16 NOTE — PROGRESS NOTES
OSCAR  Prognosis: Good  Decision Making: Medium Complexity  REQUIRES OT FOLLOW UP: Yes  Activity Tolerance  Activity Tolerance: Patient Tolerated treatment well  Safety Devices  Safety Devices in place: Yes  Type of devices: Call light within reach; Chair alarm in place; Left in chair;Nurse notified;Gait belt        Patient Diagnosis(es): The encounter diagnosis was Arthritis of right hip.     has a past medical history of Allergic rhinitis, Anxiety, Arthritis, Bipolar Disorder, Diabetes mellitus ([address] GERD (gastroesophageal reflux disease), Hearing loss, HYPERCHOLESTERAEMIA, Hypertension, Hypothyroid, Kidney disease, Nephritis, OCD (obsessive compulsive disorder), PTSD, Screening PSA (prostate specific antigen), and Sleep apnea. has a past surgical history that includes external auditory canal reconstruction; Tonsillectomy; lymph node biopsy (4/27/2010); Colonoscopy (4.27.05); Colonoscopy (5/16/14); Upper gastrointestinal endoscopy (5/16/14); Upper gastrointestinal endoscopy (6/1/15); Upper gastrointestinal endoscopy (06/13/2018); Mouth surgery; and Total hip arthroplasty (Right, 9/15/2020). Treatment Diagnosis: decreased ability to perform ADL 2/2 OSCAR      Restrictions  Position Activity Restriction  Other position/activity restrictions: up with assist, WBAT R LE, Up to bedside chair at least three times a day as tolerated. 2)  Ambulate in room progressing to hallway with assistive device four times daily (including PT) when PT advises. 3)  Bathroom privileges with assistance. Subjective   General  Chart Reviewed: Yes  Additional Pertinent Hx: PMH:  hypothyroidism, HTN, DM, sleep apnea, nephritis, PTSD, OCD, bipolar, anxiety, GERD  Family / Caregiver Present: No  Referring Practitioner: Dr. Court Figueroa  Diagnosis: Patient admitted with R hip OA, s/p R THR  Subjective  Subjective: RN cleared pt for tx. Pt in chair at session start. Patient Currently in Pain: (Pt reports some pain in R hip with movement.  Pt Status: Exceptions  Arousal/Alertness: Delayed responses to stimuli  Following Commands: Follows one step commands with increased time; Follows one step commands with repetition  Attention Span: Difficulty attending to directions  Memory: Appears intact  Safety Judgement: Decreased awareness of need for safety;Decreased awareness of need for assistance  Problem Solving: Assistance required to generate solutions;Assistance required to implement solutions;Assistance required to identify errors made;Assistance required to correct errors made;Decreased awareness of errors  Insights: Decreased awareness of deficits  Initiation: Requires cues for some  Sequencing: Requires cues for some                                Therapeutic Exercise  Pt completed 1 set, 10 reps of B digit, elbow, and shoulder flex/ext and ankle and knee flex/ext while seated after education. Pt educated to complete at least 3x/day, 10 reps increasing resistance and repetitions as able. Pt stated and demonstrated understanding. BUE strength and AROM are WFL based on functional presentation. Education: Role of OT, safe t/f training, safe use of DME, awareness of deficits, discharge planning, ADL as therapeutic exercise, importance of OOB, WB status and hip precautions        Plan   Plan  Times per week: 5-7    AM-PAC Score        AM-[facility] Inpatient Daily Activity Raw Score: 10 (09/16/20 1249)  AM-PAC Inpatient ADL T-Scale Score : 27.31 (09/16/20 1249)  ADL Inpatient CMS 0-100% Score: 74.7 (09/16/20 1249)  ADL Inpatient CMS G-Code Modifier : CL (09/16/20 1249)    Goals  Short term goals  Time Frame for Short term goals: 1 week  Short term goal 1: LB dressing with SBA and LE AE prn  Short term goal 2: Toilet t/f and hygiene at SBA  Short term goal 3: 3 grooming tasks standing with SBA  Short term goal 4: Bed mobility with I  Short term goal 5: State and follow all hip precautions without cues  Patient Goals   Patient goals : \"Feel better. \" Therapy Time   Individual Concurrent Group Co-treatment   Time In 0902         Time Out 0925         Minutes 23         Timed Code Treatment Minutes: 8 Minutes       If patient is discharged prior to next treatment session, this note will serve as the discharge summary.   Conor Riojas, OTR/L #[id number]

## 2020-09-16 NOTE — PLAN OF CARE
Problem: Falls - Risk of:  Goal: Will remain free from falls  Description: Will remain free from falls  9/16/2020 1033 by Ursula Martinez RN  Outcome: Ongoing   All fall precautions in place. Bed locked and in lowest position with alarm on. Over-bed table and personal belongings within reach. Patient instructed to use call light for assistance. Non-skids socks on. Will continue to monitor. Problem: Pain:  Goal: Control of acute pain  Description: Control of acute pain  Outcome: Ongoing   Patient complains of pain to back and RLE. Patient medicated per [facility] with prn PO pain medication and repositioned for comfort. Patient satisfied at reassessment. Will continue to assess and monitor.

## 2020-09-16 NOTE — PROGRESS NOTES
(obsessive compulsive disorder), PTSD, Screening PSA (prostate specific antigen), and Sleep apnea. has a past surgical history that includes external auditory canal reconstruction; Tonsillectomy; lymph node biopsy (4/27/2010); Colonoscopy (4.27.05); Colonoscopy (5/16/14); Upper gastrointestinal endoscopy (5/16/14); Upper gastrointestinal endoscopy (6/1/15); Upper gastrointestinal endoscopy (06/13/2018); Mouth surgery; and Total hip arthroplasty (Right, 9/15/2020). Restrictions  Position Activity Restriction  Other position/activity restrictions: up with assist, WBAT R LE, Up to bedside chair at least three times a day as tolerated. 2)  Ambulate in room progressing to hallway with assistive device four times daily (including PT) when PT advises. 3)  Bathroom privileges with assistance. Vision/Hearing  Vision: Within Functional Limits  Hearing: Exceptions to [facility]  Hearing Exceptions: Hard of hearing/hearing concerns     Subjective  General  Chart Reviewed: Yes  Additional Pertinent Hx: Hypothyroid, HTN, hypercholesteremia, DM, arthritis, sleep apnea, PTSD, OCD, [place]< GERD, bipolar, nephritis, anxiety  Referring Practitioner: Patrick Cook  Diagnosis: Right anterior OSCAR 9/15/20  Follows Commands: Impaired(delayed responses, inconsistent - see cognition)  Subjective  Subjective: Pt supine in bed upon PT entry, agreeable to working with PT. Eyes closed frequently, \"wait a minute\" to questions.   Unable to hold onto orange juice glass - needed assist with feeding/drinking  Pain Screening  Patient Currently in Pain: Denies  Vital Signs  Patient Currently in Pain: Denies       Orientation  Orientation  Overall Orientation Status: (slow to respond, cues needed)  Social/Functional History  Social/Functional History  Lives With: Spouse  Type of Home: (condo)  Home Layout: One level  Home Access: Stairs to enter with rails, Stairs to enter without rails  Entrance Stairs - Number of Steps: two steps without rail into condo,  15 steps with rail to basement condo  Bathroom Shower/Tub: Walk-in shower  Bathroom Toilet: Handicap height  Bathroom Equipment: Grab bars in shower, Grab bars around toilet  Bathroom Accessibility: Accessible  Home Equipment: Rolling walker, Cane, [address] Help From: (wife able to assist as needed)  ADL Assistance: Independent  Homemaking Assistance: Independent(with wife)  Ambulation Assistance: Independent  Transfer Assistance: Independent  Active : Yes  Occupation: Retired  Leisure & Hobbies: dog. Cognition   Cognition  Overall Cognitive Status: Exceptions  Arousal/Alertness: Delayed responses to stimuli  Following Commands: Follows one step commands with increased time; Follows one step commands with repetition  Attention Span: Difficulty attending to directions  Memory: Appears intact  Safety Judgement: (able to state several precautions)  Problem Solving: Assistance required to generate solutions  Insights: Decreased awareness of deficits  Initiation: Requires cues for some  Sequencing: Requires cues for some    Objective     Observation/Palpation  Observation: uncontrolled mouth movements noted,  pt unable to hold onto glass or feed self this AM    AROM RLE (degrees)  RLE General AROM: with assist, able to extend right knee 75%, ankle WFL  AROM LLE (degrees)  LLE AROM : WFL  Strength RLE  Comment: needed assist to straighten left knee. ankle WFL  Strength LLE  Strength LLE: WFL        Bed mobility  Supine to Sit: Moderate assistance(HOB elevated, mod assist to elevate trunk, min assist for RLE movement)  Transfers  Sit to Stand: Moderate Assistance(from eob to RW, and eob to stedy.  min from stedy seat to stand)  Stand to sit: Moderate Assistance;Minimal Assistance(pt sat down the first time from RW, unexpectedly,  from stedy, needed mod assist to control descent)  Bed to Chair: Dependent/Total(stedy used for safety)  Ambulation  Ambulation?: No     Balance  Comments: good static sitting on eob with sba and on stedy with sba. Standing balance required min assist,  pt unable to march in place        Treatment rendered and includes bed mobility, transfers and education regarding safety with functional mobility. Plan   Plan  Times per week: 7  Times per day: Twice a day  Current Treatment Recommendations: Strengthening, ROM, Safety Education & Training, Functional Mobility Training, Cognitive Reorientation, Transfer Training, Stair training  Safety Devices  Type of devices: Chair alarm in place, Nurse notified, Call light within reach, Left in chair(RN currently in room to feed pt.)    Goals  Short term goals  Time Frame for Short term goals: Discharge  Short term goal 1: Bed mobility with CG  Short term goal 2: Transfers with CG  Short term goal 3: Ambulate 40 ft with rw and CG  Short term goal 4: up and down 4-5 steps with rail and CG  Patient Goals   Patient goals :  To go home       Therapy Time   Individual Concurrent Group Co-treatment   Time In 7095         Time Out 0845         Minutes 55               Timed Code Treatment Minutes:   40    Total Treatment Minutes:  [address] [address]

## 2020-09-16 NOTE — PROGRESS NOTES
Patient alert and oriented x4, VSS on room air, neuro WNL. Patient very groggy and lethargic this morning, but has been much more awake and alert this afternoon. Dressing to R hip is clean, dry & intact. Fresh cold ice packs applied prn. Patient tolerating PO fluids and meals and denies N/V. Patient complains of pain - medicated per MAR with prn PO pain medication. Patient up to chair twice this shift, recommend x1 [facility] vs x2 stand & pivot with rolling walker and GB. Patient tolerating transfers fairly well. Patient voiding per urinal.     All fall precautions in place. Will continue to monitor.

## 2020-09-16 NOTE — PROGRESS NOTES
Department of Orthopedic Surgery     Progress Note    Subjective:   Admit Day:9/15/2020    Patient is comfortable appearing. Denies chest pain, shortness of air or calf pain. Tolerating PO. Objective[de-identified]    height is 5' 3\" (1.6 m) and weight is 206 lb (93.4 kg). His oral temperature is 97.5 °F (36.4 °C). His blood pressure is 112/68 and his pulse is 69. His respiration is 16 and oxygen saturation is 90%. General:  No acute distress. Operative Incision:  Dressing is clean, dry and intact. Operative Extremity:  - Motor function intact to Tibialis Anterior, Gastroc-Soleus Complex, Extensor Hallucis Longus, and Flexor Hallucis Longus.  -  Sensation intact to light touch in sural, saphenous, superficial peroneal, deep peroneal and tibial nerve distributions. -  Toes are wwp with a palpable dorsalis pedis pulse. Negative Meche's Sign Bilaterally    Labs:  Lab Results   Component Value Date    WBC 5.2 09/04/2020    HGB 10.2 09/16/2020    HCT 30.3 09/16/2020     09/04/2020       Lab Results   Component Value Date    INR 1.04 09/04/2020       Lab Results   Component Value Date     09/04/2020    K 5.1 09/04/2020    CO2 29 09/04/2020    BUN 27 09/04/2020    CREATININE 1.1 09/04/2020    CALCIUM 9.7 09/04/2020       Assessment:   Principal Problem:    Arthritis of right hip  Resolved Problems:    * No resolved hospital problems. *      POD # 1 s/p navarro. Doing well. Pt denies pain this morning. Dressing CDI. Patient complains of drowsiness and feeling tired. Will decrease narcotic dosage and continue to monitor. Patient will work with therapy this morning and will plan for dc home later today. Plan:   1. Weightbearing as tolerated   2. DVT Prophylaxis:  ASA  3. PT/OT per protocol  4. Pain control: per protocol    Disposition:  Homepending mobility/progress with therapy and medical stability.

## 2020-09-16 NOTE — PROGRESS NOTES
Patient is alert and oriented x4 Dressing to R hip is clean, dry, and intact. Patient consumed 100% of dinner tray and has tolerated it well. Patient uses urinal at bedside to void. Patient stood at bedside with 2x assist, walker and gait belt. Tolerated movement well. Fall precautions in place. Call light within reach. Will continue to monitor.

## 2020-09-16 NOTE — CARE COORDINATION
Case Management Assessment           Initial Evaluation                Date / Time of Evaluation: 9/16/2020 1:57 PM                 Assessment Completed by: Nia Da Silva    Patient Name: Ron Acosta     YOB: 1955  Diagnosis: Primary osteoarthritis of right hip [M16.11]  Arthritis of right hip [M16.11]     Date / Time: 9/15/2020  7:27 AM    Patient Admission Status: Observation    If patient is discharged prior to next notation, then this note serves as note for discharge by case management.      Current PCP: Daina Mccurdy MD  Clinic Patient: No    Chart Reviewed: Yes  Patient/ Family Interviewed: Yes    Initial assessment completed at bedside with: pt    Hospitalization in the last 30 days: No    Emergency Contacts:  Extended Emergency Contact Information  Primary Emergency Contact: Evelin Rodney  Address: [address] [place] [address] Phone: [phone]  Work Phone: [phone]  Mobile Phone: [phone]  Relation: Spouse  Secondary Emergency Contact: [address] [place] [address] Phone: [phone]  Mobile Phone: [phone]  Relation: Child    Advance Directives:   Code Status: Full [address]: No    Financial  Payor: MEDICARE / Plan: MEDICARE PART A AND B / Product Type: *No Product type* /     Pre-cert required for SNF: [facility] [address] [facility], [address] [place] [address] Drive  [address]  Phone: [phone] Fax: [phone] [address] [facility] [phone] - f [phone]  [address]  [address]  [facility] [address]  Phone: [phone] Fax: [address] [phone] - f [phone]  [facility] [address]  Phone: [phone] Fax: [phone]      Potential assistance Purchasing Medications:    Does Patient want to participate in local refill/ meds to beds program?:      Meds To Beds General Rules:  1. Can ONLY be done Monday- Friday between 8:30am-5pm  2. Prescription(s) must be in pharmacy by 3pm to be filled same day  3. Copy of patient's insurance/ prescription drug card and patient face sheet must be sent along with the prescription(s)  4. Cost of Rx cannot be added to hospital bill. If financial assistance is needed, please contact unit  or ;  or  CANNOT provide pharmacy voucher for patients co-pays  5. Patients can then  the prescription on their way out of the hospital at discharge, or pharmacy can deliver to the bedside if staff is available. (payment due at time of pick-up or delivery - cash, check, or card accepted)     Able to afford home medications/ co-pay costs: Yes    ADLS  Support Systems:      PT AM-PAC: 9 /24  OT AM-PAC: 10 /24    [place]: home  Steps: na    Plans to RETURN to current housing: Yes  Barriers to RETURNING to current housing: lethargy       DISCHARGE PLAN:  Disposition: Home- No Services Needed    Transportation PLAN for discharge: family     Factors facilitating achievement of predicted outcomes: Family support and Friend support    Barriers to discharge: Medical complications    Additional Case Management Notes: CM attempted to speak with pt at bedside. Pt very lethargic and had a hard time talking with him. Pt will need PT/OT, scores very low, may change if pt not lethargic.      The Plan for Transition of Care is related to the following treatment goals of Primary osteoarthritis of right hip [M16.11]  Arthritis of right hip [M16.11]    The Patient and/or patient representative Sunday Trisha and his family were provided with a choice of provider and agrees with the discharge plan Yes    Freedom of choice list was provided with basic dialogue that supports the patient's individualized plan of care/goals and shares the quality data associated with the providers.  Yes    Care Transition patient: Yes    Mann Regan RN  [facility]  Case Management Department  Ph: [phone]   Fax: [phone]

## 2020-09-16 NOTE — PROGRESS NOTES
Physical Therapy  Facility/Department: [facility] 5T ORTHO/NEURO  Daily Treatment Note  NAME: Moustapha Chau  : 1955  MRN: [id number]    Date of Service: 2020    Discharge Recommendations:    Moustapha Chau scored a 16/24 on the AM-PAC short mobility form. Current research shows that an AM-PAC score of 17 or less is typically not associated with a discharge to the patient's home setting. Based on the patient's AM-PAC score and their current functional mobility deficits, it is recommended that the patient have 3-5 sessions per week of Physical Therapy at d/c to increase the patient's independence. Please see assessment section for further patient specific details. If patient discharges prior to next session this note will serve as a discharge summary. Please see below for the latest assessment towards goals. PT Equipment Recommendations  Equipment Needed: No    Assessment   Assessment: Pt is well below his normal functional baseline post right anterior OSCAR. He currently needs min assist for all mobility. .Recommend continued therapies to maximize mobiity and safety  Treatment Diagnosis: Decreased functional mobility post right anterior OSCAR  Prognosis: Good  Decision Making: Medium Complexity  PT Education: PT Role;Goals; General Safety;Transfer Training;Weight-bearing Education  Patient Education: Pt will need reinforcement  Barriers to Learning: cognition  REQUIRES PT FOLLOW UP: Yes  Activity Tolerance  Activity Tolerance: Pt more attentive this PM able to more fully participate with PT     Patient Diagnosis(es): The encounter diagnosis was Arthritis of right hip.     has a past medical history of Allergic rhinitis, Anxiety, Arthritis, Bipolar Disorder, Diabetes mellitus ([address] GERD (gastroesophageal reflux disease), Hearing loss, HYPERCHOLESTERAEMIA, Hypertension, Hypothyroid, Kidney disease, Nephritis, OCD (obsessive compulsive disorder), PTSD, Screening PSA (prostate specific antigen), and Sleep apnea. has a past surgical history that includes external auditory canal reconstruction; Tonsillectomy; lymph node biopsy (4/27/2010); Colonoscopy (4.27.05); Colonoscopy (5/16/14); Upper gastrointestinal endoscopy (5/16/14); Upper gastrointestinal endoscopy (6/1/15); Upper gastrointestinal endoscopy (06/13/2018); Mouth surgery; and Total hip arthroplasty (Right, 9/15/2020). Restrictions  Position Activity Restriction  Other position/activity restrictions: up with assist, WBAT R LE, Up to bedside chair at least three times a day as tolerated. 2)  Ambulate in room progressing to hallway with assistive device four times daily (including PT) when PT advises. 3)  Bathroom privileges with assistance. Subjective   General  Chart Reviewed: Yes  Additional Pertinent Hx: Hypothyroid, HTN, hypercholesteremia, DM, arthritis, sleep apnea, PTSD, OCD, [place]< GERD, bipolar, nephritis, anxiety  Referring Practitioner: Savannah Chandler  Subjective  Subjective: Pt sitting up in bed upon PT entry, agreeable to working with PT. More alert, able to more fully attend to task at hand. Pain Screening  Patient Currently in Pain: Yes(notes some pain/not rated in right hip, relieved with rest/ice)  Vital Signs  Patient Currently in Pain: Yes(notes some pain/not rated in right hip, relieved with rest/ice)       Orientation  Orientation  Overall Orientation Status: (slow to respond, cues needed)  Cognition   Cognition  Overall Cognitive Status: Exceptions  Arousal/Alertness: Delayed responses to stimuli  Following Commands: Follows one step commands with increased time; Follows one step commands with repetition  Attention Span: Difficulty attending to directions  Memory: Appears intact  Safety Judgement: (able to state several precautions)  Problem Solving: Assistance required to generate solutions  Insights: Decreased awareness of deficits  Initiation: Requires cues for some  Sequencing: Requires cues for some  Objective   Bed mobility  Supine to Sit: Minimal assistance; Moderate assistance(min to mod assist with raised HOB)  Transfers  Sit to Stand: Minimal Assistance  Stand to sit: Minimal Assistance  Bed to Chair: Minimal assistance  Ambulation  Ambulation?: Yes  Ambulation 1  Surface: level tile  Device: Rolling Walker  Assistance: Minimal assistance  Quality of Gait: constant VC for sequencing, min assist to guide walker and cues for turning. Distance: 3 feet, 6 feet with chair follow     Balance  Comments: Good stability with sitting on eob with supervision and with min assist with RW for gait      AROM RLE (degrees)  RLE General AROM: with assist, able to extend right knee 75%, ankle WFL  AROM LLE (degrees)  LLE AROM : WFL  Strength RLE  Comment: needed assist to straighten left knee. ankle WFL  Strength LLE  Strength LLE: [facility] Score  AM-PAC Inpatient Mobility Raw Score : 16 (09/16/20 1437)  AM-PAC Inpatient T-Scale Score : 40.78 (09/16/20 1437)  Mobility Inpatient CMS 0-100% Score: 54.16 (09/16/20 1437)  Mobility Inpatient CMS G-Code Modifier : CK (09/16/20 1437)          Goals  Short term goals  Time Frame for Short term goals: Discharge  Short term goal 1: Bed mobility with CG  Short term goal 2: Transfers with CG  Short term goal 3: Ambulate 40 ft with rw and CG  Short term goal 4: up and down 4-5 steps with rail and CG  Patient Goals   Patient goals :  To go home    Plan    Plan  Times per week: 7  Times per day: Twice a day  Current Treatment Recommendations: Strengthening, ROM, Safety Education & Training, Functional Mobility Training, Cognitive Reorientation, Transfer Training, Stair training  Safety Devices  Type of devices: Chair alarm in place, Nurse notified, Call light within reach, Left in chair(RN currently in room to feed pt.)     Therapy Time   Individual Concurrent Group Co-treatment   Time In 1305         Time Out 1344         Minutes 39              Timed Code Treatment Minutes:    39  Total Treatment Minutes:  [address] [facility], [id number]

## 2020-09-16 NOTE — PROGRESS NOTES
Patient alert and oriented x4. Still slightly drowsy. Patient has limited movement to BLE. Surgical dressing CDI. Up x2 with stedy. Fall precautions in place, call light within reach, bed alarm on, bed in lowest position, and non skid socks on. VSS. Denies needs at this time. Will continue to monitor.

## 2020-09-16 NOTE — CONSULTS
Hospital Medicine Consult History & Physical      PCP: Jasmine Verdin MD    Date of Admission: 9/15/2020    Chief Complaint:  Right hip arthritis    History Of Present Illness:      Gil Armando is a 72 y.o. male with past medical history as below who presents with right hip arthroplasty performed yesterday. Seen working with PT today. Feeling well, has some pain but is doing ok with it. Says his wife will be able to help him at home. No chest pain, sob, cough, or abdominal pain. He had some loose stool last night and he vomited this am. Feels a little light-headed when he stands up. Is wanting to eat. Past Medical History:          Diagnosis Date    Allergic rhinitis     Anxiety     Arthritis     Bipolar Disorder     Diabetes mellitus ([address]     Prediabetic    GERD (gastroesophageal reflux disease)     Hearing loss     HYPERCHOLESTERAEMIA     Hypertension     Hypothyroid     Kidney disease     Nephritis     OCD (obsessive compulsive disorder)     PTSD     Screening PSA (prostate specific antigen) 1.3.11    result - 2.91    Sleep apnea        Past Surgical History:          Procedure Laterality Date    COLONOSCOPY  4.27.05    normal, Dr. Jen Major COLONOSCOPY  5/16/14    normal, jf    EXTERNAL AUDITORY CANAL RECONSTRUCTION      left 1980 and 1995    LYMPH NODE BIOPSY  4/27/2010    removed groin, benign, dr Ruperto Carver Right 9/15/2020    RIGHT DIRECT ANTERIOR APPROACH TOTAL HIP ARTHROPLASTY performed by Pk Hernandes MD at [address]  5/16/14    Cindy moore, check in one year    UPPER GASTROINTESTINAL ENDOSCOPY  6/1/15    jf, check one year    UPPER GASTROINTESTINAL ENDOSCOPY  06/13/2018    oscar Ayala, check in 3 years       Medications Prior to Admission:      Prior to Admission medications    Medication Sig Start Date End Date Taking?  Authorizing Provider   oxyCODONE (ROXICODONE) 5 MG immediate release tablet Take 1 tablet by mouth every 6 hours as needed for Pain for up to 7 days. Intended supply: 7 days. Take lowest dose possible to manage pain 9/15/20 9/22/20 Yes Linnie Boxer, MD   gabapentin (NEURONTIN) 100 MG capsule Take by mouth. At night   Yes Historical Provider, MD   levothyroxine (SYNTHROID) 150 MCG tablet TAKE ONE TABLET BY MOUTH DAILY 9/2/20  Yes Ari Schultz MD   losartan (COZAAR) 50 MG tablet Take 1 tablet by mouth daily 6/22/20  Yes Ari Schultz MD   Probiotic Product (ALIGN PO) Take by mouth   Yes Historical Provider, MD   simvastatin (ZOCOR) 40 MG tablet Take 1 tablet by mouth nightly 5/12/20  Yes Ari Schultz MD   fluvoxaMINE (LUVOX) 100 MG tablet  11/22/18  Yes Historical Provider, MD   pantoprazole (PROTONIX) 20 MG tablet Take 20 mg by mouth daily   Yes Historical Provider, MD   busPIRone (BUSPAR) 10 MG tablet Take 1 tablet by mouth 2 times daily. Patient taking differently: 20 mg 2 times daily  1/22/13  Yes Ari Schultz MD   desvenlafaxine (PRISTIQ) 50 MG TB24 Take 50 mg by mouth daily. Yes Historical Provider, MD   cyclobenzaprine (FLEXERIL) 10 MG tablet as needed 12/12/19   Historical Provider, MD   ONE TOUCH ULTRA TEST strip TEST ONCE DAILY 9/14/13   Ari Schultz MD   [facility] AS [facility] 9/5/13   Ari Schultz MD       Allergies:  Codeine and Nsaids    Social History:      The patient currently lives in private residence. TOBACCO:   reports that he has never smoked. He has never used smokeless tobacco.  ETOH:   reports no history of alcohol use. Family History:      Reviewed in detail and positive as follows:        Problem Relation Age of Onset    Heart Disease Mother     Heart Disease Father     Lung Cancer Sister     Arthritis Other        REVIEW OF SYSTEMS:   Pertinent positives as noted in the HPI. All other systems reviewed and negative.     PHYSICAL EXAM:    BP (!) 117/48   Pulse 94 Temp 98.5 °F (36.9 °C) (Oral)   Resp 18   Ht 5' 3\" (1.6 m)   Wt 206 lb (93.4 kg)   SpO2 96%   BMI 36.49 kg/m²     General appearance: No apparent distress, appears stated age and cooperative. HEENT: Normal cephalic, atraumatic without obvious deformity. Pupils equal, round, and reactive to light. Extra ocular muscles intact. Conjunctivae/corneas clear. Neck: Supple, with full range of motion. No jugular venous distention. Trachea midline. Respiratory:  Normal respiratory effort. Clear to auscultation, bilaterally without Rales/Wheezes/Rhonchi. Cardiovascular: Regular rate and rhythm with normal S1/S2 without murmurs, rubs or gallops. Abdomen: Soft, non-tender, non-distended with normal bowel sounds. Musculoskelatal: No clubbing, cyanosis or edema bilaterally. Full range of motion without deformity. Skin: Skin color, texture, turgor normal.  No rashes or lesions. Neurologic:  Neurovascularly intact without any focal sensory/motor deficits. Cranial nerves: II-XII intact, grossly non-focal.  Psychiatric: Alert and oriented, thought content appropriate, normal insight    Labs:     Recent Labs     09/16/20  0547   HGB 10.2*   HCT 30.3*     No results for input(s): NA, K, CL, CO2, BUN, CREATININE, CALCIUM, PHOS in the last 72 hours. Invalid input(s): MAGNES  No results for input(s): AST, ALT, BILIDIR, BILITOT, ALKPHOS in the last 72 hours. No results for input(s): INR in the last 72 hours. No results for input(s): [employer] in the last 72 hours. Urinalysis:      Lab Results   Component Value Date    NITRU Negative 02/06/2020    WBCUA 1 02/06/2020    RBCUA 2 02/06/2020    BLOODU Negative 02/06/2020    SPECGRAV 1.027 02/06/2020    GLUCOSEU Negative 02/06/2020       Studies:  XR HIP 2-3 VW W PELVIS RIGHT   Final Result   1. Intraoperative fluoroscopy as described. Melodie DOMINGUEZ FOR SURGICAL PROCEDURES   Final Result   1. Intraoperative fluoroscopy as described. .      XR PELVIS (1-2 VIEWS) Final Result      Frontal view demonstrates right hip arthroplasty in standard position with postoperative changes. Moderate-severe left hip arthritis is noted. ASSESSMENT:    Active Hospital Problems    Diagnosis Date Noted    Arthritis of right hip [M16.11] 09/15/2020   s/p total joint replacement    PLAN:    Light-headedness, probably due to blood loss and may have some dehydration also  -recommend continuation of IV fluids. -will see if can tolerate diet  -pain is fairly well controlled at this time  -working with PT currently to see if he is ok for home discharge    HTN  -continue home medications    DMII  Carb control diet    Continue home medications for bipolar disorder    DVT Prophylaxis: per primary  Diet: DIET GENERAL;  Code Status: Full Code    PT/OT Eval Status: ongoin    Dispo - Inpatient      Florence Dave DO    Thank you Casimiro Carvajal MD for the opportunity to be involved in this patient's care. If you have any questions or concerns please feel free to contact me at [employer].

## 2020-09-16 NOTE — PLAN OF CARE
Problem: Falls - Risk of:  Goal: Will remain free from falls  Description: Will remain free from falls  Outcome: Ongoing  Note: Patient has remained free from falls. Fall precautions in place, call light within reach, bed alarm on, bed in lowest position, and non skid socks on. Goal: Absence of physical injury  Description: Absence of physical injury  Outcome: Ongoing  Note: Patient has remained free from physical injury. Fall precautions are in place and patients needs are being met. Problem: Pain:  Goal: Pain level will decrease  Description: Pain level will decrease  Outcome: Ongoing  Note: Patients pain level is being monitored. Pain scale is used to assess pain and interventions are implemented as needed.

## 2020-09-17 VITALS
WEIGHT: 206 LBS | SYSTOLIC BLOOD PRESSURE: 124 MMHG | TEMPERATURE: 97.6 F | DIASTOLIC BLOOD PRESSURE: 77 MMHG | OXYGEN SATURATION: 91 % | HEART RATE: 73 BPM | BODY MASS INDEX: 36.5 KG/M2 | RESPIRATION RATE: 18 BRPM | HEIGHT: 63 IN

## 2020-09-17 PROCEDURE — 97116 GAIT TRAINING THERAPY: CPT

## 2020-09-17 PROCEDURE — 6370000000 HC RX 637 (ALT 250 FOR IP): Performed by: PHYSICIAN ASSISTANT

## 2020-09-17 PROCEDURE — 97530 THERAPEUTIC ACTIVITIES: CPT

## 2020-09-17 PROCEDURE — 97110 THERAPEUTIC EXERCISES: CPT

## 2020-09-17 PROCEDURE — 2580000003 HC RX 258: Performed by: ORTHOPAEDIC SURGERY

## 2020-09-17 PROCEDURE — 97535 SELF CARE MNGMENT TRAINING: CPT

## 2020-09-17 PROCEDURE — 6370000000 HC RX 637 (ALT 250 FOR IP): Performed by: ORTHOPAEDIC SURGERY

## 2020-09-17 RX ORDER — OXYCODONE HYDROCHLORIDE 5 MG/1
5 TABLET ORAL EVERY 6 HOURS PRN
Status: DISCONTINUED | OUTPATIENT
Start: 2020-09-17 | End: 2020-09-17

## 2020-09-17 RX ORDER — OXYCODONE HYDROCHLORIDE 5 MG/1
10 TABLET ORAL EVERY 6 HOURS PRN
Status: DISCONTINUED | OUTPATIENT
Start: 2020-09-17 | End: 2020-09-17

## 2020-09-17 RX ORDER — OXYCODONE HYDROCHLORIDE 5 MG/1
5 TABLET ORAL EVERY 6 HOURS PRN
Status: DISCONTINUED | OUTPATIENT
Start: 2020-09-17 | End: 2020-09-17 | Stop reason: HOSPADM

## 2020-09-17 RX ADMIN — Medication 10 ML: at 08:31

## 2020-09-17 RX ADMIN — LOSARTAN POTASSIUM 50 MG: 50 TABLET ORAL at 08:32

## 2020-09-17 RX ADMIN — CYCLOBENZAPRINE HYDROCHLORIDE 5 MG: 10 TABLET, FILM COATED ORAL at 08:31

## 2020-09-17 RX ADMIN — ASPIRIN 81 MG: 81 TABLET, COATED ORAL at 08:32

## 2020-09-17 RX ADMIN — BUSPIRONE HYDROCHLORIDE 20 MG: 10 TABLET ORAL at 08:32

## 2020-09-17 RX ADMIN — LEVOTHYROXINE SODIUM 150 MCG: 150 TABLET ORAL at 06:38

## 2020-09-17 RX ADMIN — OXYCODONE 10 MG: 5 TABLET ORAL at 01:49

## 2020-09-17 RX ADMIN — OXYCODONE 10 MG: 5 TABLET ORAL at 06:38

## 2020-09-17 RX ADMIN — Medication 1 CAPSULE: at 08:32

## 2020-09-17 RX ADMIN — PANTOPRAZOLE SODIUM 20 MG: 20 TABLET, DELAYED RELEASE ORAL at 08:32

## 2020-09-17 RX ADMIN — ACETAMINOPHEN 1000 MG: 500 TABLET ORAL at 06:37

## 2020-09-17 RX ADMIN — OXYCODONE 10 MG: 5 TABLET ORAL at 10:40

## 2020-09-17 RX ADMIN — DOCUSATE SODIUM 50 MG AND SENNOSIDES 8.6 MG 1 TABLET: 8.6; 5 TABLET, FILM COATED ORAL at 08:32

## 2020-09-17 RX ADMIN — ACETAMINOPHEN 1000 MG: 500 TABLET ORAL at 14:11

## 2020-09-17 ASSESSMENT — PAIN DESCRIPTION - PAIN TYPE
TYPE: SURGICAL PAIN

## 2020-09-17 ASSESSMENT — PAIN DESCRIPTION - FREQUENCY
FREQUENCY: CONTINUOUS

## 2020-09-17 ASSESSMENT — PAIN DESCRIPTION - PROGRESSION
CLINICAL_PROGRESSION: NOT CHANGED

## 2020-09-17 ASSESSMENT — PAIN DESCRIPTION - ORIENTATION
ORIENTATION: RIGHT

## 2020-09-17 ASSESSMENT — PAIN DESCRIPTION - DESCRIPTORS
DESCRIPTORS: ACHING
DESCRIPTORS: ACHING;DISCOMFORT
DESCRIPTORS: ACHING;DISCOMFORT
DESCRIPTORS: ACHING

## 2020-09-17 ASSESSMENT — PAIN - FUNCTIONAL ASSESSMENT
PAIN_FUNCTIONAL_ASSESSMENT: PREVENTS OR INTERFERES SOME ACTIVE ACTIVITIES AND ADLS

## 2020-09-17 ASSESSMENT — PAIN SCALES - GENERAL
PAINLEVEL_OUTOF10: 7
PAINLEVEL_OUTOF10: 8
PAINLEVEL_OUTOF10: 7
PAINLEVEL_OUTOF10: 10
PAINLEVEL_OUTOF10: 8

## 2020-09-17 ASSESSMENT — PAIN DESCRIPTION - LOCATION
LOCATION: HIP

## 2020-09-17 ASSESSMENT — PAIN DESCRIPTION - ONSET
ONSET: ON-GOING

## 2020-09-17 NOTE — CARE COORDINATION
Case Management Assessment            Discharge Note                    Date / Time of Note: 9/17/2020 1:47 PM                  Discharge Note Completed by: Ilana Miles    Patient Name: Dmitry Ramesh   YOB: 1955  Diagnosis: Primary osteoarthritis of right hip [M16.11]  Arthritis of right hip [M16.11]  Arthritis of right hip [M16.11]   Date / Time: 9/15/2020  7:27 AM    Current PCP: Harris Banuelos MD  Clinic patient: No    Hospitalization in the last 30 days: No    Advance Directives:  Code Status: Full Code  [place] DNR form completed and on chart: No    Financial:  Payor: MEDICARE / Plan: MEDICARE PART A AND B / Product Type: *No Product type* /      Pharmacy:    [facility],  [address]  [address]  Phone: [phone] Fax: [phone] [address] [place] [phone] - F [phone]  [facility]  [address]  [place] [address]  Phone: [phone] Fax: [phone] [phone] - f [phone]  [address] [place] [address]  Phone: [phone] Fax: [phone] medications?:    Assistance provided by Case Management: None at this time    Does patient want to participate in local refill/ meds to beds program?:      Meds To Beds General Rules:  1. Can ONLY be done Monday- Friday between 8:30am-5pm  2. Prescription(s) must be in pharmacy by 3pm to be filled same day  3. Copy of patient's insurance/ prescription drug card and patient face sheet must be sent along with the prescription(s)  4. Cost of Rx cannot be added to hospital bill. If financial assistance is needed, please contact unit  or ;  or  CANNOT provide pharmacy voucher for patients co-pays  5.  Patients can then  the prescription on their way out of the hospital at discharge, or pharmacy can deliver to the bedside if staff is available. (payment due at time of pick-up or delivery - cash, check, or card accepted)     Able to afford home medications/ co-pay costs: Yes    ADLS:  Current PT AM-PAC Score: 18 /24  Current OT AM-PAC Score: 18 /24      DISCHARGE Disposition: Home- No Services Needed    LOC at discharge: Not Applicable  MERE Completed: Not Indicated    Notification completed in HENS/PAS?:  Not Applicable    IMM Completed:   Not Indicated    Transportation:  Transportation PLAN for discharge: family   Mode of Transport: Private Car  Reason for medical transport: Not Applicable  Name of [address] [place] [address], [address]: Not Applicable    Additional CM Notes: Pt to go home without HHC. Wife to take home. The Plan for Transition of Care is related to the following treatment goals of Primary osteoarthritis of right hip [M16.11]  Arthritis of right hip [M16.11]  Arthritis of right hip [M16.11]    The Patient and/or patient representative Lencho Ashley and his family were provided with a choice of provider and agrees with the discharge plan Yes    Freedom of choice list was provided with basic dialogue that supports the patient's individualized plan of care/goals and shares the quality data associated with the providers.  Yes    Care Transitions patient: Yes    Shelby Stauffer RN  [facility]  Case Management Department  Ph: [phone]  Fax: [phone]

## 2020-09-17 NOTE — PROGRESS NOTES
Patient alert and oriented x4, VSS on room air, neurovascular status WNL. Dressing to R hip is clean, dry & intact. Patient complains of pain - medicated per MAR with prn PO pain medication. Patient declines fresh ice packs at this time. Patient up to chair this morning x1 CG assist with rolling walker and GB, tolerating ambulation and transfers fairly well. Patient voiding adequately per urinal. Patient tolerating PO fluids and meals and denies N/V. All fall precautions in place. Will continue to monitor.

## 2020-09-17 NOTE — PROGRESS NOTES
Occupational Therapy  Facility/Department: [facility] 5T ORTHO/NEURO  Daily Treatment Note  NAME: Mildred Carmichael  : 1955  MRN: [id number]    Date of Service: 2020    Discharge Recommendations:    Mildred Carmichael scored a 18/24 on the AM-PAC ADL Inpatient form. Current research shows that an AM-PAC score of 18 or greater is typically associated with a discharge to the patient's home setting. Based on the patient's AM-PAC score, and their current ADL deficits, it is recommended that the patient have 2-3 sessions per week of Occupational Therapy at d/c to increase the patient's independence. At this time, this patient demonstrates the endurance and safety to discharge home with 24hr assist and HHOT and a follow up treatment frequency of 2-3x/wk. Please see assessment section for further patient specific details. If patient discharges prior to next session this note will serve as a discharge summary. Please see below for the latest assessment towards goals. HOME HEALTH CARE: LEVEL 1 STANDARD    - Initial home health evaluation to occur within 24-48 hours, in patient home   - Therapy to evaluate with goal of regaining prior level of functioning   - Therapy to evaluate if patient has [address] needs for personal care    Assessment    Pt progressing well this session requiring CGA for functional mobility and CGA to Min A for transfers. Pt would benefit from 24hr assist up on return home. If 24hr assist is unavailable then pt may benefit from inpt OT. Current plan is for home discharge. Pt stating wife is availible for assist. Continue OT per POC.            Equipment Needs, TTB or shower chair and RW    Patient Diagnosis(es): The encounter diagnosis was Arthritis of right hip.      has a past medical history of Allergic rhinitis, Anxiety, Arthritis, Bipolar Disorder, Diabetes mellitus ([facility] [address] GERD (gastroesophageal reflux disease), Hearing loss, HYPERCHOLESTERAEMIA, Hypertension, Hypothyroid, Kidney disease, Nephritis, OCD (obsessive compulsive disorder), PTSD, Screening PSA (prostate specific antigen), and Sleep apnea. has a past surgical history that includes external auditory canal reconstruction; Tonsillectomy; lymph node biopsy (4/27/2010); Colonoscopy (4.27.05); Colonoscopy (5/16/14); Upper gastrointestinal endoscopy (5/16/14); Upper gastrointestinal endoscopy (6/1/15); Upper gastrointestinal endoscopy (06/13/2018); Mouth surgery; and Total hip arthroplasty (Right, 9/15/2020). Restrictions  Position Activity Restriction  Other position/activity restrictions: up with assist, WBAT R LE, Up to bedside chair at least three times a day as tolerated. 2)  Ambulate in room progressing to hallway with assistive device four times daily (including PT) when PT advises. 3)  Bathroom privileges with assistance. Additional Pertinent Hx: Hypothyroid, HTN, hypercholesteremia, DM, arthritis, sleep apnea, PTSD, OCD, [place]< GERD, bipolar, nephritis, anxiety      Diagnosis: Patient admitted with R hip OA, s/p R THR  Treatment Diagnosis: decreased ability to perform ADL 2/2 OSCAR    Subjective:   Pt met supine in bed and agreeable to OT treatment. Pain:   Pt with increased pain with movement. RN aware and pt repositioned to comfort. Objective:    Cognition/Orientation:  congintive impairment    Bed mobility   Supine to sit: Supervision with increased time and effort  Scooting: CGA for scooting back in chair    Functional Mobility   Sit to Stand: CGA from EOB   Stand to Sit: CGA to chair  Bed to Chair Transfer: CGA to Min A from EOB to chair with slight LOB due to posterior lean when turning to chair. Other: Short distance functional mobility from bed to chair with RW and very slow progression requiring CGA with VCs for tech. ADLs   UB dressing: CGA for donning shirt  LB dressing: Mod A for donning underwear and pants  Other: Pt educated on use of AE.  Pt stating \"I know how to use all that, I have it at home\"      Activity Tolerance:  Pt limited by fatigue    Patient Education:   Pt educated on safe transfers, safe home set up and hip precautions - pt verb understanding  Safety Devices in Place:  Pt left in chair with alarm on and call light in reach. Plan  If pt discharges prior to next treatment, this note will serve as discharge summary  Plan  Times per week: 5-7           AM-PAC Score        AM-PAC Inpatient Daily Activity Raw Score: 18 (09/17/20 1027)  AM-PAC Inpatient ADL T-Scale Score : 38.66 (09/17/20 1027)  ADL Inpatient CMS 0-100% Score: 46.65 (09/17/20 1027)  ADL Inpatient CMS G-Code Modifier : CK (09/17/20 1027)    Goals (as determined and assessed by primary OT)  Short term goals  Time Frame for Short term goals: 1 week  Short term goal 1: LB dressing with SBA and LE AE prn - ongoing  Short term goal 2: Toilet t/f and hygiene at SBA - ongoing  Short term goal 3: 3 grooming tasks standing with SBA - ongoing  Short term goal 4: Bed mobility with I - ongoing  Short term goal 5: State and follow all hip precautions without cues - ongoing  Patient Goals   Patient goals : \"Feel better. \"       Therapy Time   Individual Concurrent Group Co-treatment   Time In 2828         Time Out 0820         Minutes 30         Timed Code Treatment Minutes: 30 Minutes   Total Treatment minutes: 30 min     [address] [place] [address], Ne, NATARAJAN/L

## 2020-09-17 NOTE — PROGRESS NOTES
Patient alert and oriented x4. Surgical dressing CDI. Up x1 with stedy. c/o pain medicated per MAR. Fall precautions in place, call light within reach, bed alarm on, bed in lowest position, and non skid socks on. VSS. Denies needs at this time. Will continue to monitor.

## 2020-09-17 NOTE — PROGRESS NOTES
Physical Therapy  Daily Treatment Note    Discharge Recommendations: Britton Reardon scored a 18/24 on the AM-PAC short mobility form. Current research shows that an AM-PAC score of 18 or greater is typically associated with a discharge to the patient's home setting. Based on the patient's AM-PAC score and their current functional mobility deficits, it is recommended that the patient have 2-3 sessions per week of Physical Therapy at d/c to increase the patient's independence. At this time, this patient demonstrates the endurance and safety to discharge home with home PT and a follow up treatment frequency of 2-3x/wk. Please see assessment section for further patient specific details. Assessment:  Pt with improved alertness and activity tolerance this session. Moves slowly. Did well on stairs with B rails. Needs min assist for stairs with cane/hand held assist. Pt with good effort despite c/o R hip soreness and fatigue. Plan is for home later today with 24 hour assist. Pt would benefit from continued PT to maximize independence with mobility. No DME needs. Equipment Needs: No (pt reports having a wheeled walker and a cane at home)    [facility]: LEVEL 1 STANDARD  - Initial home health evaluation to occur within 24-48 hours, in patient home   - Therapy to evaluate with goal of regaining prior level of functioning   - Therapy to evaluate if patient has [id number] [facility] needs for personal care    Chart Reviewed: Yes     Other position/activity restrictions: up with assist, WBAT R LE, Up to bedside chair at least three times a day as tolerated. 2)  Ambulate in room progressing to hallway with assistive device four times daily (including PT) when PT advises. 3)  Bathroom privileges with assistance.    Additional Pertinent Hx: Hypothyroid, HTN, hypercholesteremia, DM, arthritis, sleep apnea, PTSD, OCD, [place]< GERD, bipolar, nephritis, anxiety      Diagnosis: Right anterior OSCAR 9/15/20   Treatment Diagnosis: Decreased functional mobility post right anterior OSCAR    Subjective: Pt in chair initially. Agreeable to working with PT. Anticipates going home today. Pt states his wife will be home to provide 24 hour assist. Also has a friend/neighbor who can assist with stair into condo if needed. Pt has 2 consecutive steps (without railings) + 15 steps (with B railings) to get to condo unit. \"I'm tired. That wore me out. \"    Pain: Sore R hip. \"It just feels like a charley horse. \" RN has been addressing with meds. Ice pack to hip after therapy session. Objective:    Transfers  Sit to stand: CGA from chair; SBA from wheelchair (x 3 times)  Stand to sit: CGA into wheelchair (x 3 times); CGA onto bed. Cues for hand placement and controlled descent. Ambulation  Assistance Level: CGA, progressing to SBA at times (pt needing CGA for turning/backing up)  Assistive device: Wheeled walker  Distance: 25 ft, 12 ft. 5 ft x 4 (to/from w/c and practice stairs.) Seated rests between walks  Quality of gait: Step-to pattern; decreased step length; cues initially for stepping into walker space; mild limp R LE; no LOB; moderate reliance on walker for support. Stairs  Up/down 2 steps x 3 (6 total) with B rails CGA, progressing to SBA  Up/down 2 steps with cane + hand held assist (pt declined to try crutches) with Min assist.   Other: Pt with good sequencing for stairs. States he practice at [employer] office prior to surgery. Bed mobility  Sit to Supine: SBA, HOB flat. Effortful for LEs. Exercises  10 reps B ankle pumps, quad sets, glut sets, R hip abd/add (mod assist), heel slides (min assist), SAQ (min assist only initially) in supine    Patient Education  Continuing HEP at home 2-3x/day. No SLR. Expressed understanding. Reviewed anterior precautions. Pt expressed and demonstrated understanding. Stair negotiation with B rails and with cane + hand held assist. Demonstrated good understanding.    Using [facility] booklet as a reference for all of the above. Expressed understanding. Calling for assist with needs. Expressed understanding. Safety Devices  Pt left with needs in reach. In bed with bed alarm on. RN updated. AM-PAC score  AM-PAC Inpatient Mobility Raw Score : 18  AM-PAC Inpatient T-Scale Score : 43.63  Mobility Inpatient CMS 0-100% Score: 46.58  Mobility Inpatient CMS G-Code Modifier : CK    Goals: (as determined and assessed by primary PT)  Time Frame for Short term goals: Discharge  Short term goal 1: Bed mobility with CG   Short term goal 2: Transfers with CG   Short term goal 3: Ambulate 40 ft with rw and CG  Short term goal 4: up and down 4-5 steps with rail and CG     Plan:  Times per week: 7; Times per day: Twice a day  Current Treatment Recommendations: Strengthening, ROM, Safety Education & Training, Functional Mobility Training, Cognitive Reorientation, Transfer Training, Stair training    Therapy Time    Individual  Concurrent  Group  Co-treatment    Time In  921            Time Out  1020            Minutes  59              Timed Code Treatment Minutes: 59  Total Treatment Minutes: 59    Will continue in PM. Practice stairs again prior to D/C home. If patient is discharged prior to next treatment, this note will serve as the discharge summary.     [facility], [place] [id number]

## 2020-09-17 NOTE — CONSULTS
Hospital Medicine Consult Note    PCP: Harry Flores MD    Date of Admission: 9/15/2020    Chief Complaint:  Right hip arthritis    History Of Present Illness:      Zach Hinton is a 72 y.o. male with past medical history as below who presents with right hip arthroplasty performed 9/15. Subjective:   Doing fine, no complaints, sleepy from pain meds but wakes up readily. Want to go home do PT/OT at home. Discussed SNF option but he is wanting to go home at this point. PHYSICAL EXAM:    BP (!) 144/87   Pulse 65   Temp 97.5 °F (36.4 °C) (Oral)   Resp 18   Ht 5' 3\" (1.6 m)   Wt 206 lb (93.4 kg)   SpO2 91%   BMI 36.49 kg/m²     General appearance: No apparent distress, appears stated age and cooperative. HEENT: Normal cephalic, atraumatic without obvious deformity. Pupils equal, round, and reactive to light. Extra ocular muscles intact. Conjunctivae/corneas clear. Neck: Supple, with full range of motion. No jugular venous distention. Trachea midline. Respiratory:  Normal respiratory effort. Clear to auscultation, bilaterally without Rales/Wheezes/Rhonchi. Cardiovascular: Regular rate and rhythm with normal S1/S2 without murmurs, rubs or gallops. Abdomen: Soft, non-tender, non-distended with normal bowel sounds. Musculoskelatal: No clubbing, cyanosis or edema bilaterally. Full range of motion without deformity. Skin: Skin color, texture, turgor normal.  No rashes or lesions. Neurologic:  Neurovascularly intact without any focal sensory/motor deficits. Cranial nerves: II-XII intact, grossly non-focal.  Psychiatric: Alert and oriented, thought content appropriate, normal insight    Labs:     Recent Labs     09/16/20  0547   HGB 10.2*   HCT 30.3*     No results for input(s): NA, K, CL, CO2, BUN, CREATININE, CALCIUM, PHOS in the last 72 hours. Invalid input(s): MAGNES  No results for input(s): AST, ALT, BILIDIR, BILITOT, ALKPHOS in the last 72 hours.   No results for input(s): INR in the last 72 hours. No results for input(s): Amaryllis Goodell in the last 72 hours. Urinalysis:      Lab Results   Component Value Date    NITRU Negative 02/06/2020    WBCUA 1 02/06/2020    RBCUA 2 02/06/2020    BLOODU Negative 02/06/2020    SPECGRAV 1.027 02/06/2020    GLUCOSEU Negative 02/06/2020       Studies:  XR HIP 2-3 VW W PELVIS RIGHT   Final Result   1. Intraoperative fluoroscopy as described. Matthew Dilling FLUORO FOR SURGICAL PROCEDURES   Final Result   1. Intraoperative fluoroscopy as described. .      XR PELVIS (1-2 VIEWS)   Final Result      Frontal view demonstrates right hip arthroplasty in standard position with postoperative changes. Moderate-severe left hip arthritis is noted. ASSESSMENT:    Active Hospital Problems    Diagnosis Date Noted    Arthritis of right hip [M16.11] 09/15/2020   s/p total joint replacement    PLAN:    Light-headedness, probably due to blood loss and may have some dehydration, resolving with IV fluids  -recommend continuation of IV fluids. -pain meds likely also a factor - reduced timing and dosage     HTN  -continue home medications    DMII  Carb control diet    Continue home medications for bipolar disorder    DVT Prophylaxis: per primary  Diet: DIET GENERAL; Carb Control: 4 carb choices (60 gms)/meal  Code Status: Full Code    PT/OT Eval Status: ongoing    Dispo - Inpatient      Florence Dave DO    Thank you Silvano Jones MD for the opportunity to be involved in this patient's care. If you have any questions or concerns please feel free to contact me at [facility].

## 2020-09-17 NOTE — PROGRESS NOTES
Physical Therapy  Daily Treatment Note    Discharge Recommendations: Regi Rivero scored a 16/24 on the AM-PAC short mobility form. Current research shows that an AM-PAC score of 18 or greater is typically associated with a discharge to the patient's home setting. Based on the patient's AM-PAC score and their current functional mobility deficits, it is recommended that the patient have 2-3 sessions per week of Physical Therapy at d/c to increase the patient's independence. At this time, this patient demonstrates the endurance and safety to discharge home with home PT and a follow up treatment frequency of 2-3x/wk. Please see assessment section for further patient specific details. **Plan is for home with 24 hour assist of wife. Pt needing increased assist this afternoon (compared to AM) due to grogginess. Anticipate AM-PAC scores/mobility will be improved once pt less groggy. Assessment:  Pt much more groggy this PM and needing increased assist overall compared to AM session. Needing heavy cues throughout. B knee buckling noted with ambulation. Increased assist needed on stairs. Plan is for home with 24 hour assist. Would benefit from continued PT at home to maximize independence with mobility. No DME needs. Equipment Needs: No (pt has a wheeled walker and a cane)    Chart Reviewed: Yes     Other position/activity restrictions: up with assist, WBAT R LE, Up to bedside chair at least three times a day as tolerated. 2)  Ambulate in room progressing to hallway with assistive device four times daily (including PT) when PT advises. 3)  Bathroom privileges with assistance. Additional Pertinent Hx: Hypothyroid, HTN, hypercholesteremia, DM, arthritis, sleep apnea, PTSD, OCD, [place]< GERD, bipolar, nephritis, anxiety      Diagnosis: Right anterior OSCAR 9/15/20   Treatment Diagnosis: Decreased functional mobility post right anterior OSCAR    Subjective: Pt in bed initially. Wife present.  Reports pt has been sleeping awhile. Pt agreeable to practicing steps with wife this afternoon in preparation for D/C today. Pt c/o feeling \"tired. \"    Pain: Minimal c/o voiced. Objective:    Bed mobility  Supine to sit: Min assist x 1 (for R LE), HOB flat without use of rail  Scooting: CGA to EOB    Transfers  Sit to stand: Min assist x 1 from bed; CGA from wheelchair (twice)  Stand to sit: Min assist x 1 with cues for backing up safely, hand placement, controlled descent    Ambulation  Assistance Level: CGA, except ~ 5 episodes knee buckling with up to Mod assist to recover  Assistive device: Wheeled walker  Distance: 15 ft, 4 ft x 3. Quality of gait: Step-to pattern; decreased step length; cues to stay within walker space; very slow and sluggish. Other: Pt with occasional knee buckling--needing up to Mod assist to recover. Stairs  Up/down 2 steps x 2 (4 total) with B handrails CGA (of wife/therapist)  Up 2 steps with cane/hand held assist and Mod assist. Down 2 steps with cane/hand held assist Min to Mod assist.     Patient Education  Donning and use of gait belt. Wife expressed understanding. Stair negotiation with B rails and with rail/hand held assist. Pt's wife expressed/demonstrated understanding, however wife expressing hesitation with assisting pt on steps without railing with current level of assist needed this afternoon (with grogginess.)  Calling for assist with needs. Expressed understanding. Safety Devices  Pt left with needs in reach. In chair with chair alarm on. RN updated.      AM-PAC score  AM-PAC Inpatient Mobility Raw Score : 16  AM-PAC Inpatient T-Scale Score : 40.78  Mobility Inpatient CMS 0-100% Score: 54.16  Mobility Inpatient CMS G-Code Modifier : CK    Goals: (as determined and assessed by primary PT)  Time Frame for Short term goals: Discharge  Short term goal 1: Bed mobility with CG   Short term goal 2: Transfers with CG   Short term goal 3: Ambulate 40 ft with rw and CG  Short term goal 4: up and down 4-5 steps with rail and CG     Plan:  Times per week: 7; Times per day: Twice a day  Current Treatment Recommendations: Strengthening, ROM, Safety Education & Training, Functional Mobility Training, Cognitive Reorientation, Transfer Training, Stair training    Therapy Time    Individual  Concurrent  Group  Co-treatment    Time In  1320            Time Out  1400            Minutes  40              Timed Code Treatment Minutes: 40  Total Treatment Minutes: 40    Tentative D/C home later today with 24 hour assist of wife. Will continue per plan of care if not D/Flex. If patient is discharged prior to next treatment, this note will serve as the discharge summary.     Elayne [place] #[id number]

## 2020-09-17 NOTE — FLOWSHEET NOTE
09/16/20 1942   Encounter Summary   Services provided to: Patient   Referral/Consult From: Syed   Continue Visiting   (9/16/2020, [place]. )   Complexity of Encounter Moderate   Length of Encounter 15 minutes   Routine   Type Initial   Assessment Calm; Approachable   Intervention Nurtured hope   Outcome Expressed gratitude

## 2020-09-17 NOTE — PROGRESS NOTES
Patient discharged. All discharge instructions reviewed with patient and patient's wife. Patient dressed in his own clothing and all belongings gathered. PIV x1 removed without complications. Patient taken out via wheelchair to wife's vehicle.

## 2020-09-17 NOTE — PLAN OF CARE
Problem: Falls - Risk of:  Goal: Will remain free from falls  Description: Will remain free from falls  9/17/2020 0944 by Yina Knox RN  Outcome: Ongoing   All fall precautions in place. Chair wheels locked with alarm on. Over-bed table and personal belongings within reach. Patient instructed to use call light for assistance. Non-skids socks on. Will continue to monitor. Problem: Pain:  Goal: Control of acute pain  Description: Control of acute pain  Outcome: Ongoing   Patient complains of pain to surgical hip. Patient medicated per [facility] with prn PO pain medication. Patient declined ice pack at this time. Patient satisfied at reassessment. Will continue to assess and monitor.

## 2020-09-17 NOTE — PLAN OF CARE
Problem: Falls - Risk of:  Goal: Will remain free from falls  Description: Will remain free from falls  9/16/2020 2014 by Angella Cm RN  Outcome: Ongoing  Note: Patient has remained free from falls. Fall precautions in place, call light within reach, bed alarm on, bed in lowest position, and non skid socks on. Problem: Falls - Risk of:  Goal: Absence of physical injury  Description: Absence of physical injury  Outcome: Ongoing  Note: Patient has remained free from physical injury. Fall precautions are in place and patients needs are being met. Problem: Pain:  Goal: Pain level will decrease  Description: Pain level will decrease  Outcome: Ongoing  Note: Patients pain level is being monitored. Pain scale is used to assess pain and interventions are implemented as needed.

## 2020-09-18 ENCOUNTER — TELEPHONE (OUTPATIENT)
Dept: FAMILY MEDICINE CLINIC | Age: 65
End: 2020-09-18

## 2020-09-18 NOTE — TELEPHONE ENCOUNTER
[facility] Transitions Initial Follow Up Call    Outreach made within 2 business days of discharge: Yes    Patient: Neal Prater Patient : 1955   MRN: [id number]  Reason for Admission: There are no discharge diagnoses documented for the most recent discharge. Discharge Date: 20       Spoke with: Katy (wife,on HIPAA)     Discharge department/facility: [facility] Interactive Patient Contact:  Was patient able to fill all prescriptions: Yes  Was patient instructed to bring all medications to the follow-up visit: Yes  Is patient taking all medications as directed in the discharge summary?  Yes  Does patient understand their discharge instructions: Yes  Does patient have questions or concerns that need addressed prior to 7-14 day follow up office visit: no    Scheduled appointment with Surgeon within 7-14 days    Follow Up  Future Appointments   Date Time Provider Bandar Murphy   2020  1:30 PM Heriberto Lemus MD [facility]       Mic Minor, [address]

## 2020-11-04 ENCOUNTER — IMMUNIZATION (OUTPATIENT)
Dept: FAMILY MEDICINE CLINIC | Age: 65
End: 2020-11-04
Payer: MEDICARE

## 2020-11-04 PROCEDURE — 90694 VACC AIIV4 NO PRSRV 0.5ML IM: CPT | Performed by: FAMILY MEDICINE

## 2020-11-04 PROCEDURE — G0008 ADMIN INFLUENZA VIRUS VAC: HCPCS | Performed by: FAMILY MEDICINE

## 2020-11-04 NOTE — PROGRESS NOTES
Vaccine Information Sheet, \"Influenza - Inactivated\"  given to Dane Braun, or parent/legal guardian of  Dane Braun and verbalized understanding. Patient responses:    Have you ever had a reaction to a flu vaccine? No  Do you have any current illness? No  Have you ever had [place] Syndrome? No  Do you have a serious allergy to any of the follow: Neomycin, Polymyxin, Thimerosal, eggs or egg products? No    Flu vaccine given per order. Please see immunization tab. Risks and benefits explained. Current VIS given.
Family history of diabetes mellitus     Mother  Still living? Yes, Estimated age: 81-90  Family history of Alzheimer's disease, Age at diagnosis: Age Unknown     Father  Still living? No  Family history of liver cancer, Age at diagnosis: Age Unknown

## 2020-12-21 RX ORDER — LEVOTHYROXINE SODIUM 0.15 MG/1
TABLET ORAL
Qty: 17 TABLET | Refills: 0 | Status: SHIPPED | OUTPATIENT
Start: 2020-12-21 | End: 2020-12-22

## 2020-12-22 ENCOUNTER — TELEPHONE (OUTPATIENT)
Dept: FAMILY MEDICINE CLINIC | Age: 65
End: 2020-12-22

## 2020-12-22 RX ORDER — LEVOTHYROXINE SODIUM 0.15 MG/1
150 TABLET ORAL DAILY
Qty: 90 TABLET | Refills: 1 | Status: SHIPPED | OUTPATIENT
Start: 2020-12-22 | End: 2021-01-05

## 2020-12-22 NOTE — TELEPHONE ENCOUNTER
[employer] is calling for a 90 day supply    levothyroxine (SYNTHROID) 150 MCG tablet     [employer]   [phone]

## 2020-12-28 ENCOUNTER — OFFICE VISIT (OUTPATIENT)
Dept: FAMILY MEDICINE CLINIC | Age: 65
End: 2020-12-28
Payer: MEDICARE

## 2020-12-28 VITALS
HEART RATE: 60 BPM | HEIGHT: 63 IN | TEMPERATURE: 97 F | BODY MASS INDEX: 35.26 KG/M2 | DIASTOLIC BLOOD PRESSURE: 84 MMHG | WEIGHT: 199 LBS | SYSTOLIC BLOOD PRESSURE: 128 MMHG

## 2020-12-28 DIAGNOSIS — E11.9 TYPE 2 DIABETES MELLITUS WITHOUT COMPLICATION, WITHOUT LONG-TERM CURRENT USE OF INSULIN (HCC): ICD-10-CM

## 2020-12-28 DIAGNOSIS — E03.9 ACQUIRED HYPOTHYROIDISM: ICD-10-CM

## 2020-12-28 DIAGNOSIS — I10 ESSENTIAL HYPERTENSION: ICD-10-CM

## 2020-12-28 LAB
GLUCOSE BLD-MCNC: 115 MG/DL (ref 70–99)
TSH SERPL DL<=0.05 MIU/L-ACNC: 13.81 UIU/ML (ref 0.27–4.2)

## 2020-12-28 PROCEDURE — G8417 CALC BMI ABV UP PARAM F/U: HCPCS | Performed by: FAMILY MEDICINE

## 2020-12-28 PROCEDURE — 2022F DILAT RTA XM EVC RTNOPTHY: CPT | Performed by: FAMILY MEDICINE

## 2020-12-28 PROCEDURE — 99214 OFFICE O/P EST MOD 30 MIN: CPT | Performed by: FAMILY MEDICINE

## 2020-12-28 PROCEDURE — G8484 FLU IMMUNIZE NO ADMIN: HCPCS | Performed by: FAMILY MEDICINE

## 2020-12-28 PROCEDURE — 3044F HG A1C LEVEL LT 7.0%: CPT | Performed by: FAMILY MEDICINE

## 2020-12-28 PROCEDURE — 1123F ACP DISCUSS/DSCN MKR DOCD: CPT | Performed by: FAMILY MEDICINE

## 2020-12-28 PROCEDURE — 1036F TOBACCO NON-USER: CPT | Performed by: FAMILY MEDICINE

## 2020-12-28 PROCEDURE — 3017F COLORECTAL CA SCREEN DOC REV: CPT | Performed by: FAMILY MEDICINE

## 2020-12-28 PROCEDURE — 4040F PNEUMOC VAC/ADMIN/RCVD: CPT | Performed by: FAMILY MEDICINE

## 2020-12-28 PROCEDURE — G8427 DOCREV CUR MEDS BY ELIG CLIN: HCPCS | Performed by: FAMILY MEDICINE

## 2020-12-28 ASSESSMENT — ENCOUNTER SYMPTOMS
BLOOD IN STOOL: 0
VOMITING: 0
NAUSEA: 0
DIARRHEA: 0
SHORTNESS OF BREATH: 0
ABDOMINAL PAIN: 0
COUGH: 0
ABDOMINAL DISTENTION: 0
CONSTIPATION: 0
CHEST TIGHTNESS: 0

## 2020-12-28 NOTE — PATIENT INSTRUCTIONS
Continue the diet  Do stay with the medicines  Stay with regular exercise  See back in the office in 4 months

## 2020-12-28 NOTE — PROGRESS NOTES
Subjective:      Patient ID: Truman De La Cruz is a 72 y.o. male. Patient presents with: Follow-up: hypertension    He tells me he is well  He has no c/o  He did have a friend pass with covid    No cold sx no fever  Medicines are the same    He does not check the glucose at home    YOB: 1955    Date of Visit:  12/28/2020     -- Codeine -- Nausea Only   -- Nsaids     --  Kidney issues    Current Outpatient Medications:    levothyroxine (SYNTHROID) 150 MCG tablet, Take 1 tablet by mouth Daily, Disp: 90 tablet, Rfl: 1    simvastatin (ZOCOR) 40 MG tablet, TAKE ONE TABLET BY MOUTH ONCE NIGHTLY, Disp: 90 tablet, Rfl: 2    losartan (COZAAR) 50 MG tablet, TAKE ONE TABLET BY MOUTH DAILY, Disp: 90 tablet, Rfl: 2    gabapentin (NEURONTIN) 100 MG capsule, Take by mouth. At night, Disp: , Rfl:     cyclobenzaprine (FLEXERIL) 10 MG tablet, as needed, Disp: , Rfl:     Probiotic Product (ALIGN PO), Take by mouth, Disp: , Rfl:     fluvoxaMINE (LUVOX) 100 MG tablet, , Disp: , Rfl:     pantoprazole (PROTONIX) 20 MG tablet, Take 20 mg by mouth daily, Disp: , Rfl:     ONE TOUCH ULTRA TEST strip, TEST ONCE DAILY, Disp: 30 strip, Rfl: 6    ONETOUCH DELICA LANCETS MISC, USE AS DIRECTED, Disp: 4 each, Rfl: 6    busPIRone (BUSPAR) 10 MG tablet, Take 1 tablet by mouth 2 times daily. (Patient taking differently: 20 mg 2 times daily ), Disp: 60 tablet, Rfl: 4    desvenlafaxine (PRISTIQ) 50 MG TB24, Take 50 mg by mouth daily.   , Disp: , Rfl:     No current facility-administered medications for this visit.       --------------------------               12/28/20                     1327        --------------------------   BP:          128/84        Site:    Left Upper Arm    Position:    Sitting        Cuff Size:  Large Adult      Pulse:         60          Temp:   97 °F (36.1 °C)    TempSrc:    Temporal       Weight: 199 lb (90.3 kg)   Height:  5' 3\" (1.6 m) --------------------------  Body mass index is 35.25 kg/m². Wt Readings from Last 3 Encounters:  12/28/20 : 199 lb (90.3 kg)  09/15/20 : 206 lb (93.4 kg)  09/04/20 : 206 lb (93.4 kg)    BP Readings from Last 3 Encounters:  12/28/20 : 128/84  09/17/20 : 124/77  09/15/20 : 111/61        Review of Systems   Constitutional: Negative for appetite change, chills, fever and unexpected weight change. Respiratory: Negative for cough, chest tightness and shortness of breath. Cardiovascular: Negative for chest pain, palpitations and leg swelling. Gastrointestinal: Negative for abdominal distention, abdominal pain, blood in stool, constipation, diarrhea, nausea and vomiting. Endocrine: Negative for polydipsia and polyuria. Genitourinary: Negative for difficulty urinating, dysuria and hematuria. Musculoskeletal:        Feet ok no sores   Neurological: Negative for headaches. Objective:   Physical Exam  Constitutional:       General: He is not in acute distress. Appearance: Normal appearance. He is well-developed. He is not ill-appearing or diaphoretic. HENT:      Head: Normocephalic and atraumatic. Eyes:      General: No scleral icterus. Neck:      Musculoskeletal: Neck supple. Thyroid: No thyroid mass or thyromegaly. Cardiovascular:      Rate and Rhythm: Normal rate and regular rhythm. Pulses:           Dorsalis pedis pulses are 2+ on the right side and 2+ on the left side. Posterior tibial pulses are 2+ on the right side and 2+ on the left side. Heart sounds: Normal heart sounds. No murmur. No friction rub. No gallop. Comments:   No edema legs  Pulmonary:      Effort: Pulmonary effort is normal. No tachypnea, accessory muscle usage or respiratory distress. Breath sounds: Normal breath sounds. No decreased breath sounds, wheezing, rhonchi or rales. Abdominal:      General: Bowel sounds are normal. There is no distension or abdominal bruit. Palpations: Abdomen is soft. There is no hepatomegaly, splenomegaly, mass or pulsatile mass. Tenderness: There is no abdominal tenderness. There is no guarding. Musculoskeletal:      Comments: Feet are warm, pink, dry. No lesions and the monofilament is normal both sides. Lymphadenopathy:      Cervical: No cervical adenopathy. Upper Body:      Right upper body: No supraclavicular adenopathy. Left upper body: No supraclavicular adenopathy. Skin:     General: Skin is warm and dry. Coloration: Skin is not pale. Nails: There is no clubbing. Neurological:      General: No focal deficit present. Mental Status: He is alert. Psychiatric:         Attention and Perception: Attention normal.         Speech: Speech normal.         Assessment:        Diagnosis Orders   1. Essential hypertension  TSH without Reflex   2. Type 2 diabetes mellitus without complication, without long-term current use of insulin ([facility])  Glucose    Hemoglobin A1C    HM DIABETES FOOT EXAM   3. Gastroesophageal reflux disease, unspecified whether esophagitis present     4. Acquired hypothyroidism  TSH without Reflex   5.  Morbidly obese ([address]         He seems to be doing well  He is stable  Has been able to lose weight       Plan:      Continue the diet  Do stay with the medicines  Stay with regular exercise  See back in the office in 4 months         Kacey Worrell MD

## 2020-12-29 LAB
ESTIMATED AVERAGE GLUCOSE: 139.9 MG/DL
HBA1C MFR BLD: 6.5 %

## 2021-01-05 DIAGNOSIS — E03.9 ACQUIRED HYPOTHYROIDISM: Primary | ICD-10-CM

## 2021-01-05 DIAGNOSIS — E66.01 MORBIDLY OBESE (HCC): ICD-10-CM

## 2021-01-05 RX ORDER — LEVOTHYROXINE SODIUM 175 UG/1
175 TABLET ORAL DAILY
Qty: 90 TABLET | Refills: 1 | Status: SHIPPED | OUTPATIENT
Start: 2021-01-05 | End: 2021-06-14

## 2021-02-19 ENCOUNTER — OFFICE VISIT (OUTPATIENT)
Dept: FAMILY MEDICINE CLINIC | Age: 66
End: 2021-02-19
Payer: MEDICARE

## 2021-02-19 VITALS
TEMPERATURE: 97 F | BODY MASS INDEX: 35.79 KG/M2 | WEIGHT: 202 LBS | HEIGHT: 63 IN | DIASTOLIC BLOOD PRESSURE: 84 MMHG | SYSTOLIC BLOOD PRESSURE: 128 MMHG

## 2021-02-19 DIAGNOSIS — E66.01 MORBIDLY OBESE (HCC): ICD-10-CM

## 2021-02-19 DIAGNOSIS — M54.32 SCIATICA OF LEFT SIDE: Primary | ICD-10-CM

## 2021-02-19 DIAGNOSIS — E03.9 ACQUIRED HYPOTHYROIDISM: ICD-10-CM

## 2021-02-19 PROCEDURE — G8417 CALC BMI ABV UP PARAM F/U: HCPCS | Performed by: FAMILY MEDICINE

## 2021-02-19 PROCEDURE — 1036F TOBACCO NON-USER: CPT | Performed by: FAMILY MEDICINE

## 2021-02-19 PROCEDURE — G8484 FLU IMMUNIZE NO ADMIN: HCPCS | Performed by: FAMILY MEDICINE

## 2021-02-19 PROCEDURE — 99213 OFFICE O/P EST LOW 20 MIN: CPT | Performed by: FAMILY MEDICINE

## 2021-02-19 PROCEDURE — G8427 DOCREV CUR MEDS BY ELIG CLIN: HCPCS | Performed by: FAMILY MEDICINE

## 2021-02-19 PROCEDURE — 4040F PNEUMOC VAC/ADMIN/RCVD: CPT | Performed by: FAMILY MEDICINE

## 2021-02-19 PROCEDURE — 1123F ACP DISCUSS/DSCN MKR DOCD: CPT | Performed by: FAMILY MEDICINE

## 2021-02-19 PROCEDURE — 3017F COLORECTAL CA SCREEN DOC REV: CPT | Performed by: FAMILY MEDICINE

## 2021-02-19 RX ORDER — CYCLOBENZAPRINE HCL 10 MG
10 TABLET ORAL 3 TIMES DAILY PRN
Qty: 21 TABLET | Refills: 0 | Status: SHIPPED | OUTPATIENT
Start: 2021-02-19 | End: 2022-04-21

## 2021-02-19 RX ORDER — PREDNISONE 10 MG/1
TABLET ORAL
Qty: 16 TABLET | Refills: 0 | Status: SHIPPED | OUTPATIENT
Start: 2021-02-19 | End: 2021-04-29 | Stop reason: ALTCHOICE

## 2021-02-19 NOTE — PROGRESS NOTES
Cyril Valdez (:  1955) is a 72 y.o. male, here for evaluation of the following chief complaint(s):  Back Pain (lower back pain x 1 week) and Hip Pain (left hip pain x 1 week)      ASSESSMENT/PLAN:   Diagnosis Orders   1. Sciatica of left side       Orders Placed This Encounter   Medications    predniSONE (DELTASONE) 10 MG tablet     Sig: Prednisone 10 mg. #16. Take 2 po bid for 2 days, then 1 po bid for 3 days,  then 1 po daily for 2 days. Then stop     Dispense:  16 tablet     Refill:  0    cyclobenzaprine (FLEXERIL) 10 MG tablet     Sig: Take 1 tablet by mouth 3 times daily as needed for Muscle spasms     Dispense:  21 tablet     Refill:  0       Likely a nerve root irritation sciatica  He is stable  Will treat with steroid and rest  Educated  No snow shoveling    Use the steroid with food  Use the muscle relaxer as needed  Use heat as needed but do not sleep on the heating pad  No follow-ups on file. SUBJECTIVE/OBJECTIVE:  Patient presents with:  Back Pain: lower back pain x 1 week  Hip Pain: left hip pain x 1 week    Above sx  He points to the left lower back above the hip and goes down the left leg  It got worse after shoveling snow  No fall  No numbness  Urine and stool is ok     No rash  No fever      YOB: 1955    Date of Visit:  2021     -- Codeine -- Nausea Only   -- Nsaids     --  Kidney issues    Current Outpatient Medications:    levothyroxine (SYNTHROID) 175 MCG tablet, Take 1 tablet by mouth Daily, Disp: 90 tablet, Rfl: 1    simvastatin (ZOCOR) 40 MG tablet, TAKE ONE TABLET BY MOUTH ONCE NIGHTLY, Disp: 90 tablet, Rfl: 2    losartan (COZAAR) 50 MG tablet, TAKE ONE TABLET BY MOUTH DAILY, Disp: 90 tablet, Rfl: 2    gabapentin (NEURONTIN) 100 MG capsule, Take by mouth.  At night, Disp: , Rfl:     cyclobenzaprine (FLEXERIL) 10 MG tablet, as needed, Disp: , Rfl:     Probiotic Product (ALIGN PO), Take by mouth, Disp: , Rfl:   fluvoxaMINE (LUVOX) 100 MG tablet, , Disp: , Rfl:     pantoprazole (PROTONIX) 20 MG tablet, Take 20 mg by mouth daily, Disp: , Rfl:     ONE TOUCH ULTRA TEST strip, TEST ONCE DAILY, Disp: 30 strip, Rfl: 6    ONETOUCH DELICA LANCETS MISC, USE AS DIRECTED, Disp: 4 each, Rfl: 6    busPIRone (BUSPAR) 10 MG tablet, Take 1 tablet by mouth 2 times daily. (Patient taking differently: 20 mg 2 times daily ), Disp: 60 tablet, Rfl: 4    desvenlafaxine (PRISTIQ) 50 MG TB24, Take 50 mg by mouth daily. , Disp: , Rfl:     No current facility-administered medications for this visit.       --------------------------               02/19/21                     1128        --------------------------   BP:          128/84        Site:    Left Upper Arm    Position:    Sitting        Cuff Size:  Large Adult      Temp:   97 °F (36.1 °C)    TempSrc:    Temporal       Weight: 202 lb (91.6 kg)   Height:  5' 3\" (1.6 m)    --------------------------  Body mass index is 35.78 kg/m². Wt Readings from Last 3 Encounters:  02/19/21 : 202 lb (91.6 kg)  12/28/20 : 199 lb (90.3 kg)  09/15/20 : 206 lb (93.4 kg)    BP Readings from Last 3 Encounters:  02/19/21 : 128/84  12/28/20 : 128/84  09/17/20 : 124/77        Review of Systems    Physical Exam  Constitutional:       General: He is not in acute distress. Appearance: Normal appearance. He is not ill-appearing. Musculoskeletal:      Comments: He is tender to palpate on the lower left muscles of the back about L5 level  No pain to percuss the L spine  Toe and heel walk is fine  Bend to the left hurts on the left    SLL left leg hurts on the left side    Skin:     General: Skin is warm and dry. Coloration: Skin is not pale. Comments: No rash no marks on the back    Neurological:      General: No focal deficit present. Mental Status: He is alert.       Deep Tendon Reflexes:      Reflex Scores: Patellar reflexes are 2+ on the right side and 2+ on the left side. Achilles reflexes are 2+ on the right side and 2+ on the left side. An electronic signature was used to authenticate this note.     --Tomas Dyson MD

## 2021-02-19 NOTE — PATIENT INSTRUCTIONS
Use the steroid with food  Use the muscle relaxer as needed  Use heat as needed but do not sleep on the heating pad

## 2021-02-20 LAB
T4 FREE: 1.6 NG/DL (ref 0.9–1.8)
TSH SERPL DL<=0.05 MIU/L-ACNC: 3.24 UIU/ML (ref 0.27–4.2)

## 2021-02-23 ENCOUNTER — OFFICE VISIT (OUTPATIENT)
Dept: FAMILY MEDICINE CLINIC | Age: 66
End: 2021-02-23
Payer: MEDICARE

## 2021-02-23 VITALS
DIASTOLIC BLOOD PRESSURE: 84 MMHG | WEIGHT: 205 LBS | OXYGEN SATURATION: 98 % | SYSTOLIC BLOOD PRESSURE: 128 MMHG | TEMPERATURE: 97.5 F | BODY MASS INDEX: 36.32 KG/M2 | HEIGHT: 63 IN

## 2021-02-23 DIAGNOSIS — R05.9 COUGH: Primary | ICD-10-CM

## 2021-02-23 PROCEDURE — 99213 OFFICE O/P EST LOW 20 MIN: CPT | Performed by: FAMILY MEDICINE

## 2021-02-23 PROCEDURE — 4040F PNEUMOC VAC/ADMIN/RCVD: CPT | Performed by: FAMILY MEDICINE

## 2021-02-23 PROCEDURE — 1123F ACP DISCUSS/DSCN MKR DOCD: CPT | Performed by: FAMILY MEDICINE

## 2021-02-23 PROCEDURE — 1036F TOBACCO NON-USER: CPT | Performed by: FAMILY MEDICINE

## 2021-02-23 PROCEDURE — G8484 FLU IMMUNIZE NO ADMIN: HCPCS | Performed by: FAMILY MEDICINE

## 2021-02-23 PROCEDURE — 3017F COLORECTAL CA SCREEN DOC REV: CPT | Performed by: FAMILY MEDICINE

## 2021-02-23 PROCEDURE — G8417 CALC BMI ABV UP PARAM F/U: HCPCS | Performed by: FAMILY MEDICINE

## 2021-02-23 PROCEDURE — G8427 DOCREV CUR MEDS BY ELIG CLIN: HCPCS | Performed by: FAMILY MEDICINE

## 2021-02-23 NOTE — PROGRESS NOTES
Nandini Fernandez (:  1955) is a 72 y.o. male,, here for evaluation of the following chief complaint(s):  Cough (coughing up phlegm x 1 day)      ASSESSMENT/PLAN:   Diagnosis Orders   1. Cough  COVID-19 Ambulatory    [place] NONINVASV OXYGEN SATUR;SINGLE           Do the testing  Take fluids  Rest  If short of breath or chest pain  Stay away from people for the next 10 days  No follow-ups on file. SUBJECTIVE/OBJECTIVE:  Patient presents with:  Cough: coughing up phlegm x 1 day    Feeling better now  Sx for one day  No fever  No sore throat no muscle ache  No v no d  No flu sx  Smell and taste is same and no change  No sob  His sciatica is better      No exposure known    YOB: 1955    Date of Visit:  2021     -- Codeine -- Nausea Only   -- Nsaids     --  Kidney issues    Current Outpatient Medications:    predniSONE (DELTASONE) 10 MG tablet, Prednisone 10 mg. #16. Take 2 po bid for 2 days, then 1 po bid for 3 days,  then 1 po daily for 2 days. Then stop, Disp: 16 tablet, Rfl: 0    cyclobenzaprine (FLEXERIL) 10 MG tablet, Take 1 tablet by mouth 3 times daily as needed for Muscle spasms, Disp: 21 tablet, Rfl: 0    levothyroxine (SYNTHROID) 175 MCG tablet, Take 1 tablet by mouth Daily, Disp: 90 tablet, Rfl: 1    simvastatin (ZOCOR) 40 MG tablet, TAKE ONE TABLET BY MOUTH ONCE NIGHTLY, Disp: 90 tablet, Rfl: 2    losartan (COZAAR) 50 MG tablet, TAKE ONE TABLET BY MOUTH DAILY, Disp: 90 tablet, Rfl: 2    gabapentin (NEURONTIN) 100 MG capsule, Take by mouth.  At night, Disp: , Rfl:     Probiotic Product (ALIGN PO), Take by mouth, Disp: , Rfl:     fluvoxaMINE (LUVOX) 100 MG tablet, , Disp: , Rfl:     pantoprazole (PROTONIX) 20 MG tablet, Take 20 mg by mouth daily, Disp: , Rfl:     ONE TOUCH ULTRA TEST strip, TEST ONCE DAILY, Disp: 30 strip, Rfl: 6    ONETOUCH DELICA LANCETS MISC, USE AS DIRECTED, Disp: 4 each, Rfl: 6   busPIRone (BUSPAR) 10 MG tablet, Take 1 tablet by mouth 2 times daily. (Patient taking differently: 20 mg 2 times daily ), Disp: 60 tablet, Rfl: 4    desvenlafaxine (PRISTIQ) 50 MG TB24, Take 50 mg by mouth daily. , Disp: , Rfl:     No current facility-administered medications for this visit. YOB: 1955    Date of Visit:  2/23/2021     -- Codeine -- Nausea Only   -- Nsaids     --  Kidney issues    Current Outpatient Medications:    predniSONE (DELTASONE) 10 MG tablet, Prednisone 10 mg. #16. Take 2 po bid for 2 days, then 1 po bid for 3 days,  then 1 po daily for 2 days. Then stop, Disp: 16 tablet, Rfl: 0    cyclobenzaprine (FLEXERIL) 10 MG tablet, Take 1 tablet by mouth 3 times daily as needed for Muscle spasms, Disp: 21 tablet, Rfl: 0    levothyroxine (SYNTHROID) 175 MCG tablet, Take 1 tablet by mouth Daily, Disp: 90 tablet, Rfl: 1    simvastatin (ZOCOR) 40 MG tablet, TAKE ONE TABLET BY MOUTH ONCE NIGHTLY, Disp: 90 tablet, Rfl: 2    losartan (COZAAR) 50 MG tablet, TAKE ONE TABLET BY MOUTH DAILY, Disp: 90 tablet, Rfl: 2    gabapentin (NEURONTIN) 100 MG capsule, Take by mouth. At night, Disp: , Rfl:     Probiotic Product (ALIGN PO), Take by mouth, Disp: , Rfl:     fluvoxaMINE (LUVOX) 100 MG tablet, , Disp: , Rfl:     pantoprazole (PROTONIX) 20 MG tablet, Take 20 mg by mouth daily, Disp: , Rfl:     ONE TOUCH ULTRA TEST strip, TEST ONCE DAILY, Disp: 30 strip, Rfl: 6    ONETOUCH DELICA LANCETS MISC, USE AS DIRECTED, Disp: 4 each, Rfl: 6    busPIRone (BUSPAR) 10 MG tablet, Take 1 tablet by mouth 2 times daily. (Patient taking differently: 20 mg 2 times daily ), Disp: 60 tablet, Rfl: 4    desvenlafaxine (PRISTIQ) 50 MG TB24, Take 50 mg by mouth daily.   , Disp: , Rfl:     No current facility-administered medications for this visit.       ---------------------------               02/23/21                      1558         --------------------------- BP:          128/84         Site:    Left Upper Arm     Position:     Sitting        Cuff Size:   Large Adult      Temp:   97.5 °F (36.4 °C)   TempSrc:    Temporal        SpO2:          98%          Weight:  205 lb (93 kg)     Height:   5' 3\" (1.6 m)    ---------------------------  Body mass index is 36.31 kg/m². Wt Readings from Last 3 Encounters:  02/23/21 : 205 lb (93 kg)  02/19/21 : 202 lb (91.6 kg)  12/28/20 : 199 lb (90.3 kg)    BP Readings from Last 3 Encounters:  02/23/21 : 128/84  02/19/21 : 128/84  12/28/20 : 128/84        Review of Systems    Physical Exam  Constitutional:       General: He is not in acute distress. Appearance: Normal appearance. He is well-developed. He is not ill-appearing or diaphoretic. HENT:      Head: Normocephalic and atraumatic. Right Ear: Tympanic membrane and ear canal normal.      Left Ear: Tympanic membrane and ear canal normal.      Nose: Nose normal.      Mouth/Throat:      Lips: Pink. Mouth: Mucous membranes are moist. No oral lesions. Pharynx: Oropharynx is clear. Uvula midline. Neck:      Musculoskeletal: Neck supple. Pulmonary:      Effort: Pulmonary effort is normal. No tachypnea, accessory muscle usage or respiratory distress. Breath sounds: Normal breath sounds. No decreased breath sounds, wheezing, rhonchi or rales. Lymphadenopathy:      Head:      Right side of head: No submental or submandibular adenopathy. Left side of head: No submental or submandibular adenopathy. Cervical: No cervical adenopathy. Upper Body:      Right upper body: No supraclavicular adenopathy. Left upper body: No supraclavicular adenopathy. Skin:     General: Skin is warm and dry. Coloration: Skin is not pale. Neurological:      Mental Status: He is alert. An electronic signature was used to authenticate this note.     --Ramiro Iglesias MD

## 2021-02-23 NOTE — PATIENT INSTRUCTIONS
Do the testing  Take fluids  Rest  If short of breath or chest pain  Stay away from people for the next 10 days

## 2021-02-26 ENCOUNTER — OFFICE VISIT (OUTPATIENT)
Dept: PRIMARY CARE CLINIC | Age: 66
End: 2021-02-26
Payer: MEDICARE

## 2021-02-26 DIAGNOSIS — Z11.59 SCREENING FOR VIRAL DISEASE: Primary | ICD-10-CM

## 2021-02-26 PROCEDURE — 99211 OFF/OP EST MAY X REQ PHY/QHP: CPT | Performed by: NURSE PRACTITIONER

## 2021-02-26 PROCEDURE — G8428 CUR MEDS NOT DOCUMENT: HCPCS | Performed by: NURSE PRACTITIONER

## 2021-02-26 PROCEDURE — G8417 CALC BMI ABV UP PARAM F/U: HCPCS | Performed by: NURSE PRACTITIONER

## 2021-02-26 NOTE — PATIENT INSTRUCTIONS

## 2021-02-26 NOTE — PROGRESS NOTES
Maren Lee received a viral test for COVID-19. They were educated on isolation and quarantine as appropriate. For any symptoms, they were directed to seek care from their PCP, given contact information to establish with a doctor, directed to an urgent care or the emergency room.

## 2021-02-27 LAB — SARS-COV-2: NOT DETECTED

## 2021-04-29 ENCOUNTER — OFFICE VISIT (OUTPATIENT)
Dept: FAMILY MEDICINE CLINIC | Age: 66
End: 2021-04-29
Payer: MEDICARE

## 2021-04-29 VITALS
WEIGHT: 203 LBS | TEMPERATURE: 97.2 F | BODY MASS INDEX: 35.97 KG/M2 | SYSTOLIC BLOOD PRESSURE: 128 MMHG | HEART RATE: 52 BPM | DIASTOLIC BLOOD PRESSURE: 80 MMHG | HEIGHT: 63 IN

## 2021-04-29 DIAGNOSIS — Z01.818 PREOP EXAMINATION: Primary | ICD-10-CM

## 2021-04-29 DIAGNOSIS — Z01.818 PREOP EXAMINATION: ICD-10-CM

## 2021-04-29 DIAGNOSIS — I10 ESSENTIAL HYPERTENSION: ICD-10-CM

## 2021-04-29 DIAGNOSIS — K21.9 GASTROESOPHAGEAL REFLUX DISEASE, UNSPECIFIED WHETHER ESOPHAGITIS PRESENT: ICD-10-CM

## 2021-04-29 DIAGNOSIS — E11.9 TYPE 2 DIABETES MELLITUS WITHOUT COMPLICATION, WITHOUT LONG-TERM CURRENT USE OF INSULIN (HCC): ICD-10-CM

## 2021-04-29 DIAGNOSIS — M25.552 LEFT HIP PAIN: ICD-10-CM

## 2021-04-29 LAB
APTT: 29.3 SEC (ref 24.2–36.2)
BILIRUBIN URINE: NEGATIVE
BLOOD, URINE: NEGATIVE
C-REACTIVE PROTEIN: <3 MG/L (ref 0–5.1)
CLARITY: CLEAR
COLOR: YELLOW
GLUCOSE URINE: NEGATIVE MG/DL
INR BLD: 1.04 (ref 0.86–1.14)
KETONES, URINE: NEGATIVE MG/DL
LEUKOCYTE ESTERASE, URINE: NEGATIVE
MICROSCOPIC EXAMINATION: NORMAL
NITRITE, URINE: NEGATIVE
PH UA: 6.5 (ref 5–8)
PROTEIN UA: NEGATIVE MG/DL
PROTHROMBIN TIME: 12.1 SEC (ref 10–13.2)
SEDIMENTATION RATE, ERYTHROCYTE: 10 MM/HR (ref 0–20)
SPECIFIC GRAVITY UA: 1.03 (ref 1–1.03)
URINE TYPE: NORMAL
UROBILINOGEN, URINE: 0.2 E.U./DL

## 2021-04-29 PROCEDURE — G8417 CALC BMI ABV UP PARAM F/U: HCPCS | Performed by: FAMILY MEDICINE

## 2021-04-29 PROCEDURE — 99214 OFFICE O/P EST MOD 30 MIN: CPT | Performed by: FAMILY MEDICINE

## 2021-04-29 PROCEDURE — 1123F ACP DISCUSS/DSCN MKR DOCD: CPT | Performed by: FAMILY MEDICINE

## 2021-04-29 PROCEDURE — G8427 DOCREV CUR MEDS BY ELIG CLIN: HCPCS | Performed by: FAMILY MEDICINE

## 2021-04-29 PROCEDURE — 3046F HEMOGLOBIN A1C LEVEL >9.0%: CPT | Performed by: FAMILY MEDICINE

## 2021-04-29 PROCEDURE — 4040F PNEUMOC VAC/ADMIN/RCVD: CPT | Performed by: FAMILY MEDICINE

## 2021-04-29 PROCEDURE — 2022F DILAT RTA XM EVC RTNOPTHY: CPT | Performed by: FAMILY MEDICINE

## 2021-04-29 PROCEDURE — 3017F COLORECTAL CA SCREEN DOC REV: CPT | Performed by: FAMILY MEDICINE

## 2021-04-29 PROCEDURE — 1036F TOBACCO NON-USER: CPT | Performed by: FAMILY MEDICINE

## 2021-04-29 ASSESSMENT — ENCOUNTER SYMPTOMS
DIARRHEA: 0
VOMITING: 0
BACK PAIN: 0
ABDOMINAL DISTENTION: 0
SORE THROAT: 0
NAUSEA: 0
CHEST TIGHTNESS: 0
SHORTNESS OF BREATH: 0
ABDOMINAL PAIN: 0
CONSTIPATION: 0
VOICE CHANGE: 0
BLOOD IN STOOL: 0
COUGH: 0

## 2021-04-29 NOTE — PATIENT INSTRUCTIONS
On the morning of the surgery take the pantoprazole, thyroid medicine with just enough water to get the pills down  See us back in September

## 2021-04-29 NOTE — PROGRESS NOTES
Subjective:      Patient ID: Eligio Peters is a 77 y.o. male. Chief Complaint   Patient presents with    Pre-op Exam     Left Hip Surgery on 5- by Dr. Ruperto Moore at [address] [place] [address]. Patient presents with:  Pre-op Exam: Left Hip Surgery on 5- by Dr. Ruperto Moore at [address] [place] [address]. He is here for the above. He is well  He has no c/o  He did get his covid vaccines   He tells me the cardiologist sent in the clearance    Allergies:   -- Codeine -- Nausea Only   -- Nsaids     --  Kidney issues     height is 5' 3\" (1.6 m) and weight is 203 lb (92.1 kg). His temporal temperature is 97.2 °F (36.2 °C). His blood pressure is 128/80 and his pulse is 52. No tobacco use. No ETOH use. Immunization History  Administered            Date(s) Administered    COVID-19, Moderna, PF, 100mcg/0.5mL                          03/01/2021 03/29/2021      Influenza, Quadv, adjuvanted, 65 yrs +, IM, PF (Fluad)                          11/04/2020      Pneumococcal Polysaccharide ([id number])                          07/27/2020      Td, unspecified formulation                          06/26/2006      Tdap (Boostrix, Adacel)                          07/27/2017      Zoster Live (Zostavax)                          05/03/2011      Current Outpatient Medications:     levothyroxine (SYNTHROID) 175 MCG tablet, Take 1 tablet by mouth Daily,    simvastatin (ZOCOR) 40 MG tablet, TAKE ONE TABLET BY MOUTH ONCE NIGHTLY    losartan (COZAAR) 50 MG tablet, TAKE ONE TABLET BY MOUTH DAILY    Probiotic Product (ALIGN PO), Take by mouth    fluvoxaMINE (LUVOX) 100 MG tablet    pantoprazole (PROTONIX) 20 MG tablet, Take 20 mg by mouth daily    ONE TOUCH ULTRA TEST strip, TEST ONCE DAILY     ONETOUCH DELICA LANCDAVID MISC, USE AS DIRECTED,    busPIRone (BUSPAR) 10 MG tablet, Take 1 tablet by mouth 2 times daily. (Patient taking differently: 20 mg 2 times daily ),     desvenlafaxine (PRISTIQ) 50 MG TB24, Take 50 mg by mouth daily. Past Surgical History:  4.27.05: COLONOSCOPY      Comment:  Dr. Chan piña  5/16/14: COLONOSCOPY      Comment:  jf piña  No date: EXTERNAL AUDITORY CANAL RECONSTRUCTION      Comment:  left 1980 and 1995 4/27/2010: LYMPH NODE BIOPSY      Comment:  removed groin, benign, dr Tristin Mckeon  No date: MOUTH SURGERY  No date: TONSILLECTOMY  9/15/2020: TOTAL HIP ARTHROPLASTY; Right      Comment:  RIGHT DIRECT ANTERIOR APPROACH TOTAL HIP ARTHROPLASTY                performed by Tri Mascorro MD at [address]  5/16/14: UPPER GASTROINTESTINAL ENDOSCOPY      Comment:  jf moore, check in one year  6/1/15: UPPER GASTROINTESTINAL ENDOSCOPY      Comment:  Bri Leblanc, check one year  06/13/2018: UPPER GASTROINTESTINAL ENDOSCOPY      Comment:  oscar Leblanc, check in 3 years    No problems with anesthesia     Past Medical History:  No date: Allergic rhinitis  No date: Anxiety  No date: Arthritis  No date: Bipolar Disorder  No date: Diabetes mellitus (HCC)      Comment:  Prediabetic  No date: GERD (gastroesophageal reflux disease)  No date: Hearing loss  No date: HYPERCHOLESTERAEMIA  No date: Hypertension  No date: Hypothyroid  No date: Kidney disease  No date: Nephritis  No date: OCD (obsessive compulsive disorder)  No date: PTSD  No date: Sleep apnea            Review of Systems   Constitutional: Negative for appetite change, chills, fever and unexpected weight change. HENT: Negative for sore throat and voice change. No loose teeth. Front teeth crown. Permanent bridge work     Respiratory: Negative for cough, chest tightness and shortness of breath. Cardiovascular: Negative for chest pain, palpitations and leg swelling. See cardiology and stable    Gastrointestinal: Negative for abdominal distention, abdominal pain, blood in stool, constipation, diarrhea, nausea and vomiting. No gerd no dysphagia    Genitourinary: Negative for difficulty urinating, dysuria and hematuria. Right upper body: No supraclavicular adenopathy. Left upper body: No supraclavicular adenopathy. Skin:     General: Skin is warm and dry. Coloration: Skin is not pale. Nails: There is no clubbing. Neurological:      General: No focal deficit present. Mental Status: He is alert. Psychiatric:         Attention and Perception: Attention normal.         Mood and Affect: Mood normal.         Assessment:        Diagnosis Orders   1. Preop examination  Hemoglobin A1C    Comprehensive Metabolic Panel   2. Left hip pain     3. Essential hypertension  Comprehensive Metabolic Panel    Lipid Panel   4. Type 2 diabetes mellitus without complication, without long-term current use of insulin ([facility])  Hemoglobin A1C    Comprehensive Metabolic Panel    Lipid Panel   5. Gastroesophageal reflux disease, unspecified whether esophagitis present         Recent visit to renal medicine on 3/9/21 and all ok and reviewed  [id number] Christina Zambrano for the surgery       Plan:       On the morning of the surgery take the pantoprazole, thyroid medicine with just enough water to get the pills down  See us back in September         Magdaleno Martin MD

## 2021-04-30 LAB
A/G RATIO: 1.7 (ref 1.1–2.2)
ALBUMIN SERPL-MCNC: 4.3 G/DL (ref 3.4–5)
ALP BLD-CCNC: 62 U/L (ref 40–129)
ALT SERPL-CCNC: 23 U/L (ref 10–40)
ANION GAP SERPL CALCULATED.3IONS-SCNC: 10 MMOL/L (ref 3–16)
AST SERPL-CCNC: 15 U/L (ref 15–37)
BILIRUB SERPL-MCNC: 0.5 MG/DL (ref 0–1)
BUN BLDV-MCNC: 24 MG/DL (ref 7–20)
CALCIUM SERPL-MCNC: 9.5 MG/DL (ref 8.3–10.6)
CHLORIDE BLD-SCNC: 103 MMOL/L (ref 99–110)
CHOLESTEROL, TOTAL: 154 MG/DL (ref 0–199)
CO2: 30 MMOL/L (ref 21–32)
CREAT SERPL-MCNC: 1.2 MG/DL (ref 0.8–1.3)
ESTIMATED AVERAGE GLUCOSE: 151.3 MG/DL
GFR AFRICAN AMERICAN: >60
GFR NON-AFRICAN AMERICAN: >60
GLOBULIN: 2.6 G/DL
GLUCOSE BLD-MCNC: 102 MG/DL (ref 70–99)
HBA1C MFR BLD: 6.9 %
HDLC SERPL-MCNC: 76 MG/DL (ref 40–60)
LDL CHOLESTEROL CALCULATED: 56 MG/DL
POTASSIUM SERPL-SCNC: 5.4 MMOL/L (ref 3.5–5.1)
SODIUM BLD-SCNC: 143 MMOL/L (ref 136–145)
TOTAL PROTEIN: 6.9 G/DL (ref 6.4–8.2)
TRIGL SERPL-MCNC: 109 MG/DL (ref 0–150)
VLDLC SERPL CALC-MCNC: 22 MG/DL

## 2021-05-04 ENCOUNTER — TELEPHONE (OUTPATIENT)
Dept: FAMILY MEDICINE CLINIC | Age: 66
End: 2021-05-04

## 2021-05-06 ENCOUNTER — OFFICE VISIT (OUTPATIENT)
Dept: PRIMARY CARE CLINIC | Age: 66
End: 2021-05-06
Payer: MEDICARE

## 2021-05-06 DIAGNOSIS — Z01.818 PRE-OP EXAMINATION: Primary | ICD-10-CM

## 2021-05-06 LAB — SARS-COV-2: NOT DETECTED

## 2021-05-06 PROCEDURE — 99211 OFF/OP EST MAY X REQ PHY/QHP: CPT | Performed by: NURSE PRACTITIONER

## 2021-05-07 RX ORDER — OMEGA-3S/DHA/EPA/FISH OIL/D3 300MG-1000
1 CAPSULE ORAL DAILY
COMMUNITY

## 2021-05-07 NOTE — PROGRESS NOTES
Immediately following your procedure, your will be taken to the PACU for the first phase of your recovery. Your nurse will help you recover from any potential side effects of anesthesia, such as extreme drowsiness, changes in your vital signs or breathing patterns. Nausea, headache, muscle aches, or sore throat may also occur after anesthesia. Your nurse will help you manage these potential side effects. 2. For comfort and safety, arrange to have someone at home with you for the first 24 hours after discharge. 3. You and your family will be given written instructions about your diet, activity, dressing care, medications, and return visits. 4. Once at home, should issues with nausea, pain, or bleeding occur, or should you notice any signs of infection, you should call your surgeon. 5. Always clean your hands before and after caring for your wound. Do not let your family touch your surgery site without cleaning their hands. 6. Narcotic pain medications can cause significant constipation. You may want to add a stool softener to your postoperative medication schedule or speak to your surgeon on how best to manage this SIDE EFFECT. SPECIAL INSTRUCTIONS patient acknowledges understanding of pre op instructions. Thank you for allowing us to care for you. We strive to exceed your expectations in the delivery of care and service provided to you and your family. If you need to contact the [place] [address] staff for any reason, please call us at [phone]    Instructions reviewed with patient during preadmission testing phone interview. Felisha Marinelli. 5/7/2021 .4:03 PM      ADDITIONAL EDUCATIONAL INFORMATION REVIEWED PER PHONE WITH YOU AND/OR YOUR FAMILY:  Yes Hibiclens® Bathing Instructions however patient states he will not use Hibiclens but will use antibacterial soap.   Yes Antibacterial Soap

## 2021-05-07 NOTE — PROGRESS NOTES
Patient lists allergy to nsaids, celebrex and ortho mix not ordered; message left on voicemail Dr. Fields Solid office for updated orders. -DB    k 5.4 done 4/29/21 PCP noted and advised patient to avoid potassium rich foods. Routed to surgeon.   Anesthesia Dr. Ward Camarena informed and new order to repeat chemistry panel DOS-db

## 2021-05-10 ENCOUNTER — ANESTHESIA EVENT (OUTPATIENT)
Dept: OPERATING ROOM | Age: 66
End: 2021-05-10
Payer: MEDICARE

## 2021-05-10 NOTE — PROGRESS NOTES
[facility] / [facility] ([facility]) [address] [facility], [address]    Acknowledgment of Informed Consent for Surgical or Medical Procedure and Sedation  I agree to allow doctor(s) [id number] Guevara and his/her associates or assistants, including residents and/or other qualified medical practitioner to perform the following medical treatment or procedure and to administer or direct the administration of sedation as necessary:  Procedure(s): DIRECT ANTERIOR APPROACH LEFT TOTAL HIP ARTHROPLASTY       My doctor has explained the following regarding the proposed procedure:   the explanation of the procedure   the benefits of the procedure   the potential problems that might occur during recuperation   the risks and side effects of the procedure which could include but are not limited to severe blood loss, infection, stroke or death   the benefits, risks and side effect of alternative procedures including the consequences of declining this procedure or any alternative procedures   the likelihood of achieving satisfactory results. I acknowledge no guarantee or assurance has been made to me regarding the results. I understand that during the course of this treatment/procedure, unforeseen conditions can occur which require an additional or different procedure. I agree to allow my physician or assistants to perform such extension of the original procedure as they may find necessary. I understand that sedation will often result in temporary impairment of memory and fine motor skills and that sedation can occasionally progress to a state of deep sedation or general anesthesia. I understand the risks of anesthesia for surgery include, but are not limited to, sore throat, hoarseness, injury to face, mouth, or teeth; nausea; headache; injury to blood vessels or nerves; death, brain damage, or paralysis.     I understand that if I have a Limitation of Treatment order in effect during my hospitalization, the order may or may not be in effect during this procedure. I give my doctor permission to give me blood or blood products. I understand that there are risks with receiving blood such as hepatitis, AIDS, fever, or allergic reaction. I acknowledge that the risks, benefits, and alternatives of this treatment have been explained to me and that no express or implied warranty has been given by the hospital, any blood bank, or any person or entity as to the blood or blood components transfused. At the discretion of my doctor, I agree to allow observers, equipment/product representatives and allow photographing, and/or televising of the procedure, provided my name or identity is maintained confidentially. I agree the hospital may dispose of or use for scientific or educational purposes any tissue, fluid, or body parts which may be removed.     ________________________________Date________Time______ am/pm  ([facility])  Patient or Signature of Closest Relative or Legal Guardian    ________________________________Date________Time______am/pm      Page 1 of  1  Witness

## 2021-05-11 ENCOUNTER — ANESTHESIA (OUTPATIENT)
Dept: OPERATING ROOM | Age: 66
End: 2021-05-11
Payer: MEDICARE

## 2021-05-11 ENCOUNTER — APPOINTMENT (OUTPATIENT)
Dept: GENERAL RADIOLOGY | Age: 66
End: 2021-05-11
Attending: ORTHOPAEDIC SURGERY
Payer: MEDICARE

## 2021-05-11 ENCOUNTER — HOSPITAL ENCOUNTER (OUTPATIENT)
Age: 66
Setting detail: OBSERVATION
Discharge: HOME | End: 2021-05-12
Attending: ORTHOPAEDIC SURGERY | Admitting: ORTHOPAEDIC SURGERY
Payer: MEDICARE

## 2021-05-11 VITALS — TEMPERATURE: 79.7 F | OXYGEN SATURATION: 100 % | SYSTOLIC BLOOD PRESSURE: 91 MMHG | DIASTOLIC BLOOD PRESSURE: 54 MMHG

## 2021-05-11 DIAGNOSIS — M16.12 ARTHRITIS OF LEFT HIP: Primary | ICD-10-CM

## 2021-05-11 LAB
ABO/RH: NORMAL
ANION GAP SERPL CALCULATED.3IONS-SCNC: 12 MMOL/L (ref 3–16)
ANTIBODY SCREEN: NORMAL
BASOPHILS ABSOLUTE: 0.1 K/UL (ref 0–0.2)
BASOPHILS RELATIVE PERCENT: 1.3 %
BUN BLDV-MCNC: 41 MG/DL (ref 7–20)
CALCIUM SERPL-MCNC: 9.6 MG/DL (ref 8.3–10.6)
CHLORIDE BLD-SCNC: 102 MMOL/L (ref 99–110)
CO2: 23 MMOL/L (ref 21–32)
CREAT SERPL-MCNC: 1.3 MG/DL (ref 0.8–1.3)
EKG ATRIAL RATE: 44 BPM
EKG DIAGNOSIS: NORMAL
EKG P AXIS: 44 DEGREES
EKG P-R INTERVAL: 332 MS
EKG Q-T INTERVAL: 432 MS
EKG QRS DURATION: 94 MS
EKG QTC CALCULATION (BAZETT): 369 MS
EKG R AXIS: -5 DEGREES
EKG T AXIS: 25 DEGREES
EKG VENTRICULAR RATE: 44 BPM
EOSINOPHILS ABSOLUTE: 0 K/UL (ref 0–0.6)
EOSINOPHILS RELATIVE PERCENT: 0.2 %
GFR AFRICAN AMERICAN: >60
GFR NON-AFRICAN AMERICAN: 55
GLUCOSE BLD-MCNC: 115 MG/DL (ref 70–99)
GLUCOSE BLD-MCNC: 125 MG/DL (ref 70–99)
GLUCOSE BLD-MCNC: 146 MG/DL (ref 70–99)
HCT VFR BLD CALC: 39.6 % (ref 40.5–52.5)
HEMOGLOBIN: 12.9 G/DL (ref 13.5–17.5)
LYMPHOCYTES ABSOLUTE: 1.5 K/UL (ref 1–5.1)
LYMPHOCYTES RELATIVE PERCENT: 17.3 %
MCH RBC QN AUTO: 26.2 PG (ref 26–34)
MCHC RBC AUTO-ENTMCNC: 32.6 G/DL (ref 31–36)
MCV RBC AUTO: 80.3 FL (ref 80–100)
MONOCYTES ABSOLUTE: 0.6 K/UL (ref 0–1.3)
MONOCYTES RELATIVE PERCENT: 6.6 %
NEUTROPHILS ABSOLUTE: 6.7 K/UL (ref 1.7–7.7)
NEUTROPHILS RELATIVE PERCENT: 74.6 %
PDW BLD-RTO: 15.8 % (ref 12.4–15.4)
PERFORMED ON: ABNORMAL
PERFORMED ON: ABNORMAL
PLATELET # BLD: 189 K/UL (ref 135–450)
PMV BLD AUTO: 9.9 FL (ref 5–10.5)
POTASSIUM SERPL-SCNC: 4.8 MMOL/L (ref 3.5–5.1)
RBC # BLD: 4.93 M/UL (ref 4.2–5.9)
SODIUM BLD-SCNC: 137 MMOL/L (ref 136–145)
WBC # BLD: 8.9 K/UL (ref 4–11)

## 2021-05-11 PROCEDURE — 3600000015 HC SURGERY LEVEL 5 ADDTL 15MIN: Performed by: ORTHOPAEDIC SURGERY

## 2021-05-11 PROCEDURE — 2580000003 HC RX 258: Performed by: ORTHOPAEDIC SURGERY

## 2021-05-11 PROCEDURE — 93005 ELECTROCARDIOGRAM TRACING: CPT | Performed by: ORTHOPAEDIC SURGERY

## 2021-05-11 PROCEDURE — 3700000000 HC ANESTHESIA ATTENDED CARE: Performed by: ORTHOPAEDIC SURGERY

## 2021-05-11 PROCEDURE — 72170 X-RAY EXAM OF PELVIS: CPT

## 2021-05-11 PROCEDURE — 2500000003 HC RX 250 WO HCPCS: Performed by: NURSE ANESTHETIST, CERTIFIED REGISTERED

## 2021-05-11 PROCEDURE — 6370000000 HC RX 637 (ALT 250 FOR IP): Performed by: ORTHOPAEDIC SURGERY

## 2021-05-11 PROCEDURE — 86901 BLOOD TYPING SEROLOGIC RH(D): CPT

## 2021-05-11 PROCEDURE — 6360000002 HC RX W HCPCS: Performed by: ORTHOPAEDIC SURGERY

## 2021-05-11 PROCEDURE — 97162 PT EVAL MOD COMPLEX 30 MIN: CPT

## 2021-05-11 PROCEDURE — 7100000000 HC PACU RECOVERY - FIRST 15 MIN: Performed by: ORTHOPAEDIC SURGERY

## 2021-05-11 PROCEDURE — 80048 BASIC METABOLIC PNL TOTAL CA: CPT

## 2021-05-11 PROCEDURE — 3600000005 HC SURGERY LEVEL 5 BASE: Performed by: ORTHOPAEDIC SURGERY

## 2021-05-11 PROCEDURE — 97530 THERAPEUTIC ACTIVITIES: CPT

## 2021-05-11 PROCEDURE — 97116 GAIT TRAINING THERAPY: CPT

## 2021-05-11 PROCEDURE — 3209999900 FLUORO FOR SURGICAL PROCEDURES

## 2021-05-11 PROCEDURE — 6360000002 HC RX W HCPCS: Performed by: ANESTHESIOLOGY

## 2021-05-11 PROCEDURE — 2500000003 HC RX 250 WO HCPCS: Performed by: ORTHOPAEDIC SURGERY

## 2021-05-11 PROCEDURE — 87641 MR-STAPH DNA AMP PROBE: CPT

## 2021-05-11 PROCEDURE — C1776 JOINT DEVICE (IMPLANTABLE): HCPCS | Performed by: ORTHOPAEDIC SURGERY

## 2021-05-11 PROCEDURE — 93010 ELECTROCARDIOGRAM REPORT: CPT | Performed by: INTERNAL MEDICINE

## 2021-05-11 PROCEDURE — 62327 NJX INTERLAMINAR LMBR/SAC: CPT | Performed by: ANESTHESIOLOGY

## 2021-05-11 PROCEDURE — G0378 HOSPITAL OBSERVATION PER HR: HCPCS

## 2021-05-11 PROCEDURE — 7100000001 HC PACU RECOVERY - ADDTL 15 MIN: Performed by: ORTHOPAEDIC SURGERY

## 2021-05-11 PROCEDURE — 94150 VITAL CAPACITY TEST: CPT

## 2021-05-11 PROCEDURE — 3700000001 HC ADD 15 MINUTES (ANESTHESIA): Performed by: ORTHOPAEDIC SURGERY

## 2021-05-11 PROCEDURE — 2720000010 HC SURG SUPPLY STERILE: Performed by: ORTHOPAEDIC SURGERY

## 2021-05-11 PROCEDURE — 97166 OT EVAL MOD COMPLEX 45 MIN: CPT

## 2021-05-11 PROCEDURE — 73502 X-RAY EXAM HIP UNI 2-3 VIEWS: CPT

## 2021-05-11 PROCEDURE — 2500000003 HC RX 250 WO HCPCS: Performed by: ANESTHESIOLOGY

## 2021-05-11 PROCEDURE — 85025 COMPLETE CBC W/AUTO DIFF WBC: CPT

## 2021-05-11 PROCEDURE — 6360000002 HC RX W HCPCS: Performed by: NURSE ANESTHETIST, CERTIFIED REGISTERED

## 2021-05-11 PROCEDURE — 86850 RBC ANTIBODY SCREEN: CPT

## 2021-05-11 PROCEDURE — 2709999900 HC NON-CHARGEABLE SUPPLY: Performed by: ORTHOPAEDIC SURGERY

## 2021-05-11 PROCEDURE — 6370000000 HC RX 637 (ALT 250 FOR IP): Performed by: ANESTHESIOLOGY

## 2021-05-11 PROCEDURE — 86900 BLOOD TYPING SEROLOGIC ABO: CPT

## 2021-05-11 DEVICE — OXINIUM FEMORAL HEAD 12/14 TAPER                                    36 MM L/+8
Type: IMPLANTABLE DEVICE | Site: HIP | Status: FUNCTIONAL
Brand: OXINIUM

## 2021-05-11 DEVICE — POLARSTEM COLLAR STANDARD                                    NON-CEMENTED WITH TI/HA 2
Type: IMPLANTABLE DEVICE | Site: HIP | Status: FUNCTIONAL
Brand: POLARSTEM

## 2021-05-11 DEVICE — LINER ACET SZ D ID36MM THK3.9MM 0DEG HIP X3 LOK RNG FOR: Type: IMPLANTABLE DEVICE | Site: HIP | Status: FUNCTIONAL

## 2021-05-11 DEVICE — SHELL ACET SZ D DIA50MM 3 CLUS H TRITANIUM PRESSFIT PRI: Type: IMPLANTABLE DEVICE | Site: HIP | Status: FUNCTIONAL

## 2021-05-11 RX ORDER — SODIUM CHLORIDE 0.9 % (FLUSH) 0.9 %
5-40 SYRINGE (ML) INJECTION PRN
Status: DISCONTINUED | OUTPATIENT
Start: 2021-05-11 | End: 2021-05-12 | Stop reason: HOSPADM

## 2021-05-11 RX ORDER — OXYCODONE HYDROCHLORIDE 5 MG/1
5 TABLET ORAL PRN
Status: COMPLETED | OUTPATIENT
Start: 2021-05-11 | End: 2021-05-11

## 2021-05-11 RX ORDER — SODIUM CHLORIDE 0.9 % (FLUSH) 0.9 %
10 SYRINGE (ML) INJECTION PRN
Status: DISCONTINUED | OUTPATIENT
Start: 2021-05-11 | End: 2021-05-11 | Stop reason: HOSPADM

## 2021-05-11 RX ORDER — OXYCODONE HYDROCHLORIDE 5 MG/1
5 TABLET ORAL EVERY 4 HOURS PRN
Status: DISCONTINUED | OUTPATIENT
Start: 2021-05-11 | End: 2021-05-12 | Stop reason: HOSPADM

## 2021-05-11 RX ORDER — MIDAZOLAM HYDROCHLORIDE 1 MG/ML
2 INJECTION INTRAMUSCULAR; INTRAVENOUS ONCE
Status: COMPLETED | OUTPATIENT
Start: 2021-05-11 | End: 2021-05-11

## 2021-05-11 RX ORDER — DEXAMETHASONE SODIUM PHOSPHATE 4 MG/ML
4 INJECTION, SOLUTION INTRA-ARTICULAR; INTRALESIONAL; INTRAMUSCULAR; INTRAVENOUS; SOFT TISSUE ONCE
Status: COMPLETED | OUTPATIENT
Start: 2021-05-11 | End: 2021-05-11

## 2021-05-11 RX ORDER — SODIUM CHLORIDE 0.9 % (FLUSH) 0.9 %
10 SYRINGE (ML) INJECTION EVERY 12 HOURS SCHEDULED
Status: DISCONTINUED | OUTPATIENT
Start: 2021-05-11 | End: 2021-05-11 | Stop reason: HOSPADM

## 2021-05-11 RX ORDER — ASPIRIN 81 MG/1
81 TABLET ORAL DAILY
Status: DISCONTINUED | OUTPATIENT
Start: 2021-05-12 | End: 2021-05-12 | Stop reason: HOSPADM

## 2021-05-11 RX ORDER — PROMETHAZINE HYDROCHLORIDE 25 MG/ML
6.25 INJECTION, SOLUTION INTRAMUSCULAR; INTRAVENOUS
Status: DISCONTINUED | OUTPATIENT
Start: 2021-05-11 | End: 2021-05-11 | Stop reason: HOSPADM

## 2021-05-11 RX ORDER — SODIUM CHLORIDE 9 MG/ML
25 INJECTION, SOLUTION INTRAVENOUS PRN
Status: DISCONTINUED | OUTPATIENT
Start: 2021-05-11 | End: 2021-05-11 | Stop reason: HOSPADM

## 2021-05-11 RX ORDER — SODIUM CHLORIDE 9 MG/ML
INJECTION, SOLUTION INTRAVENOUS CONTINUOUS
Status: DISCONTINUED | OUTPATIENT
Start: 2021-05-11 | End: 2021-05-12 | Stop reason: HOSPADM

## 2021-05-11 RX ORDER — MORPHINE SULFATE 4 MG/ML
1 INJECTION, SOLUTION INTRAMUSCULAR; INTRAVENOUS EVERY 5 MIN PRN
Status: DISCONTINUED | OUTPATIENT
Start: 2021-05-11 | End: 2021-05-11 | Stop reason: HOSPADM

## 2021-05-11 RX ORDER — MEPERIDINE HYDROCHLORIDE 25 MG/ML
12.5 INJECTION INTRAMUSCULAR; INTRAVENOUS; SUBCUTANEOUS EVERY 5 MIN PRN
Status: DISCONTINUED | OUTPATIENT
Start: 2021-05-11 | End: 2021-05-11 | Stop reason: HOSPADM

## 2021-05-11 RX ORDER — ACETAMINOPHEN 500 MG
1000 TABLET ORAL EVERY 8 HOURS SCHEDULED
Status: DISCONTINUED | OUTPATIENT
Start: 2021-05-11 | End: 2021-05-12 | Stop reason: HOSPADM

## 2021-05-11 RX ORDER — METOCLOPRAMIDE HYDROCHLORIDE 5 MG/ML
10 INJECTION INTRAMUSCULAR; INTRAVENOUS
Status: DISCONTINUED | OUTPATIENT
Start: 2021-05-11 | End: 2021-05-11 | Stop reason: HOSPADM

## 2021-05-11 RX ORDER — DIPHENHYDRAMINE HYDROCHLORIDE 50 MG/ML
12.5 INJECTION INTRAMUSCULAR; INTRAVENOUS
Status: DISCONTINUED | OUTPATIENT
Start: 2021-05-11 | End: 2021-05-11 | Stop reason: HOSPADM

## 2021-05-11 RX ORDER — OXYCODONE HYDROCHLORIDE 5 MG/1
5 TABLET ORAL EVERY 6 HOURS PRN
Qty: 28 TABLET | Refills: 0 | Status: SHIPPED | OUTPATIENT
Start: 2021-05-11 | End: 2021-05-18

## 2021-05-11 RX ORDER — LOSARTAN POTASSIUM 50 MG/1
50 TABLET ORAL DAILY
Status: DISCONTINUED | OUTPATIENT
Start: 2021-05-12 | End: 2021-05-12 | Stop reason: HOSPADM

## 2021-05-11 RX ORDER — ONDANSETRON 2 MG/ML
4 INJECTION INTRAMUSCULAR; INTRAVENOUS EVERY 6 HOURS PRN
Status: DISCONTINUED | OUTPATIENT
Start: 2021-05-11 | End: 2021-05-12 | Stop reason: HOSPADM

## 2021-05-11 RX ORDER — SODIUM CHLORIDE, SODIUM LACTATE, POTASSIUM CHLORIDE, CALCIUM CHLORIDE 600; 310; 30; 20 MG/100ML; MG/100ML; MG/100ML; MG/100ML
INJECTION, SOLUTION INTRAVENOUS CONTINUOUS
Status: DISCONTINUED | OUTPATIENT
Start: 2021-05-11 | End: 2021-05-11

## 2021-05-11 RX ORDER — OXYCODONE HYDROCHLORIDE 5 MG/1
10 TABLET ORAL EVERY 4 HOURS PRN
Status: DISCONTINUED | OUTPATIENT
Start: 2021-05-11 | End: 2021-05-12 | Stop reason: HOSPADM

## 2021-05-11 RX ORDER — SODIUM CHLORIDE 0.9 % (FLUSH) 0.9 %
5-40 SYRINGE (ML) INJECTION EVERY 12 HOURS SCHEDULED
Status: DISCONTINUED | OUTPATIENT
Start: 2021-05-11 | End: 2021-05-12 | Stop reason: HOSPADM

## 2021-05-11 RX ORDER — FENTANYL CITRATE 50 UG/ML
100 INJECTION, SOLUTION INTRAMUSCULAR; INTRAVENOUS ONCE
Status: COMPLETED | OUTPATIENT
Start: 2021-05-11 | End: 2021-05-11

## 2021-05-11 RX ORDER — LIDOCAINE HYDROCHLORIDE 20 MG/ML
INJECTION, SOLUTION EPIDURAL; INFILTRATION; INTRACAUDAL; PERINEURAL PRN
Status: DISCONTINUED | OUTPATIENT
Start: 2021-05-11 | End: 2021-05-11 | Stop reason: SDUPTHER

## 2021-05-11 RX ORDER — MAGNESIUM HYDROXIDE 1200 MG/15ML
LIQUID ORAL CONTINUOUS PRN
Status: COMPLETED | OUTPATIENT
Start: 2021-05-11 | End: 2021-05-11

## 2021-05-11 RX ORDER — LEVOTHYROXINE SODIUM 175 UG/1
175 TABLET ORAL DAILY
Status: DISCONTINUED | OUTPATIENT
Start: 2021-05-12 | End: 2021-05-12 | Stop reason: HOSPADM

## 2021-05-11 RX ORDER — CYCLOBENZAPRINE HCL 10 MG
10 TABLET ORAL 3 TIMES DAILY PRN
Status: DISCONTINUED | OUTPATIENT
Start: 2021-05-11 | End: 2021-05-12 | Stop reason: HOSPADM

## 2021-05-11 RX ORDER — SODIUM CHLORIDE 9 MG/ML
25 INJECTION, SOLUTION INTRAVENOUS PRN
Status: DISCONTINUED | OUTPATIENT
Start: 2021-05-11 | End: 2021-05-12 | Stop reason: HOSPADM

## 2021-05-11 RX ORDER — PROPOFOL 10 MG/ML
INJECTION, EMULSION INTRAVENOUS CONTINUOUS PRN
Status: DISCONTINUED | OUTPATIENT
Start: 2021-05-11 | End: 2021-05-11 | Stop reason: SDUPTHER

## 2021-05-11 RX ORDER — FLUVOXAMINE MALEATE 100 MG
100 TABLET ORAL NIGHTLY
Status: DISCONTINUED | OUTPATIENT
Start: 2021-05-11 | End: 2021-05-12 | Stop reason: HOSPADM

## 2021-05-11 RX ORDER — LACTOBACILLUS RHAMNOSUS GG 10B CELL
1 CAPSULE ORAL DAILY
Status: DISCONTINUED | OUTPATIENT
Start: 2021-05-11 | End: 2021-05-12 | Stop reason: HOSPADM

## 2021-05-11 RX ORDER — SENNA AND DOCUSATE SODIUM 50; 8.6 MG/1; MG/1
1 TABLET, FILM COATED ORAL 2 TIMES DAILY
Status: DISCONTINUED | OUTPATIENT
Start: 2021-05-11 | End: 2021-05-12 | Stop reason: HOSPADM

## 2021-05-11 RX ORDER — CEFAZOLIN SODIUM 2 G/50ML
2000 SOLUTION INTRAVENOUS ONCE
Status: COMPLETED | OUTPATIENT
Start: 2021-05-11 | End: 2021-05-11

## 2021-05-11 RX ORDER — ACETAMINOPHEN 500 MG
1000 TABLET ORAL ONCE
Status: DISCONTINUED | OUTPATIENT
Start: 2021-05-11 | End: 2021-05-11 | Stop reason: HOSPADM

## 2021-05-11 RX ORDER — GLYCOPYRROLATE 0.2 MG/ML
0.2 INJECTION INTRAMUSCULAR; INTRAVENOUS ONCE
Status: COMPLETED | OUTPATIENT
Start: 2021-05-11 | End: 2021-05-11

## 2021-05-11 RX ORDER — BUSPIRONE HYDROCHLORIDE 10 MG/1
20 TABLET ORAL NIGHTLY
Status: DISCONTINUED | OUTPATIENT
Start: 2021-05-11 | End: 2021-05-12 | Stop reason: HOSPADM

## 2021-05-11 RX ORDER — OMEGA-3S/DHA/EPA/FISH OIL/D3 300MG-1000
400 CAPSULE ORAL DAILY
Status: DISCONTINUED | OUTPATIENT
Start: 2021-05-11 | End: 2021-05-12 | Stop reason: HOSPADM

## 2021-05-11 RX ORDER — HYDRALAZINE HYDROCHLORIDE 20 MG/ML
5 INJECTION INTRAMUSCULAR; INTRAVENOUS EVERY 10 MIN PRN
Status: DISCONTINUED | OUTPATIENT
Start: 2021-05-11 | End: 2021-05-11 | Stop reason: HOSPADM

## 2021-05-11 RX ORDER — OXYCODONE HYDROCHLORIDE 5 MG/1
10 TABLET ORAL PRN
Status: COMPLETED | OUTPATIENT
Start: 2021-05-11 | End: 2021-05-11

## 2021-05-11 RX ORDER — LABETALOL HYDROCHLORIDE 5 MG/ML
5 INJECTION, SOLUTION INTRAVENOUS EVERY 10 MIN PRN
Status: DISCONTINUED | OUTPATIENT
Start: 2021-05-11 | End: 2021-05-11 | Stop reason: HOSPADM

## 2021-05-11 RX ORDER — CEFAZOLIN SODIUM 2 G/50ML
2000 SOLUTION INTRAVENOUS EVERY 8 HOURS
Status: COMPLETED | OUTPATIENT
Start: 2021-05-11 | End: 2021-05-12

## 2021-05-11 RX ORDER — PANTOPRAZOLE SODIUM 40 MG/1
40 TABLET, DELAYED RELEASE ORAL DAILY
Status: DISCONTINUED | OUTPATIENT
Start: 2021-05-11 | End: 2021-05-12 | Stop reason: HOSPADM

## 2021-05-11 RX ORDER — EPHEDRINE SULFATE 50 MG/ML
INJECTION INTRAVENOUS PRN
Status: DISCONTINUED | OUTPATIENT
Start: 2021-05-11 | End: 2021-05-11 | Stop reason: SDUPTHER

## 2021-05-11 RX ORDER — BUPIVACAINE HYDROCHLORIDE 2.5 MG/ML
INJECTION, SOLUTION EPIDURAL; INFILTRATION; INTRACAUDAL PRN
Status: DISCONTINUED | OUTPATIENT
Start: 2021-05-11 | End: 2021-05-11 | Stop reason: ALTCHOICE

## 2021-05-11 RX ORDER — DESVENLAFAXINE 50 MG/1
50 TABLET, EXTENDED RELEASE ORAL DAILY
Status: DISCONTINUED | OUTPATIENT
Start: 2021-05-11 | End: 2021-05-12 | Stop reason: HOSPADM

## 2021-05-11 RX ORDER — GLYCOPYRROLATE 0.2 MG/ML
INJECTION INTRAMUSCULAR; INTRAVENOUS PRN
Status: DISCONTINUED | OUTPATIENT
Start: 2021-05-11 | End: 2021-05-11 | Stop reason: SDUPTHER

## 2021-05-11 RX ORDER — ATORVASTATIN CALCIUM 20 MG/1
20 TABLET, FILM COATED ORAL NIGHTLY
Status: DISCONTINUED | OUTPATIENT
Start: 2021-05-11 | End: 2021-05-12 | Stop reason: HOSPADM

## 2021-05-11 RX ADMIN — TRANEXAMIC ACID 1000 MG: 1 INJECTION, SOLUTION INTRAVENOUS at 07:42

## 2021-05-11 RX ADMIN — EPHEDRINE SULFATE 10 MG: 50 INJECTION INTRAVENOUS at 08:04

## 2021-05-11 RX ADMIN — SODIUM CHLORIDE: 9 INJECTION, SOLUTION INTRAVENOUS at 14:04

## 2021-05-11 RX ADMIN — PHENYLEPHRINE HYDROCHLORIDE 100 MCG: 10 INJECTION, SOLUTION INTRAMUSCULAR; INTRAVENOUS; SUBCUTANEOUS at 08:38

## 2021-05-11 RX ADMIN — EPHEDRINE SULFATE 10 MG: 50 INJECTION INTRAVENOUS at 08:27

## 2021-05-11 RX ADMIN — EPHEDRINE SULFATE 10 MG: 50 INJECTION INTRAVENOUS at 07:49

## 2021-05-11 RX ADMIN — PHENYLEPHRINE HYDROCHLORIDE 100 MCG: 10 INJECTION, SOLUTION INTRAMUSCULAR; INTRAVENOUS; SUBCUTANEOUS at 08:21

## 2021-05-11 RX ADMIN — ATORVASTATIN CALCIUM 20 MG: 20 TABLET, FILM COATED ORAL at 21:41

## 2021-05-11 RX ADMIN — EPHEDRINE SULFATE 10 MG: 50 INJECTION INTRAVENOUS at 08:20

## 2021-05-11 RX ADMIN — DOCUSATE SODIUM 50 MG AND SENNOSIDES 8.6 MG 1 TABLET: 8.6; 5 TABLET, FILM COATED ORAL at 21:41

## 2021-05-11 RX ADMIN — LIDOCAINE HYDROCHLORIDE 5 ML: 20 INJECTION, SOLUTION EPIDURAL; INFILTRATION; INTRACAUDAL; PERINEURAL at 07:15

## 2021-05-11 RX ADMIN — OXYCODONE HYDROCHLORIDE 5 MG: 5 TABLET ORAL at 18:45

## 2021-05-11 RX ADMIN — Medication 1 CAPSULE: at 18:38

## 2021-05-11 RX ADMIN — PHENYLEPHRINE HYDROCHLORIDE 200 MCG: 10 INJECTION, SOLUTION INTRAMUSCULAR; INTRAVENOUS; SUBCUTANEOUS at 08:30

## 2021-05-11 RX ADMIN — OXYCODONE 10 MG: 5 TABLET ORAL at 12:50

## 2021-05-11 RX ADMIN — HYDROMORPHONE HYDROCHLORIDE 0.5 MG: 1 INJECTION, SOLUTION INTRAMUSCULAR; INTRAVENOUS; SUBCUTANEOUS at 10:05

## 2021-05-11 RX ADMIN — CHOLECALCIFEROL (VITAMIN D3) 10 MCG (400 UNIT) TABLET 400 UNITS: at 18:38

## 2021-05-11 RX ADMIN — LIDOCAINE HYDROCHLORIDE 5 ML: 20 INJECTION, SOLUTION EPIDURAL; INFILTRATION; INTRACAUDAL; PERINEURAL at 07:16

## 2021-05-11 RX ADMIN — SODIUM CHLORIDE, POTASSIUM CHLORIDE, SODIUM LACTATE AND CALCIUM CHLORIDE: 600; 310; 30; 20 INJECTION, SOLUTION INTRAVENOUS at 06:38

## 2021-05-11 RX ADMIN — PHENYLEPHRINE HYDROCHLORIDE 100 MCG: 10 INJECTION, SOLUTION INTRAMUSCULAR; INTRAVENOUS; SUBCUTANEOUS at 08:16

## 2021-05-11 RX ADMIN — EPHEDRINE SULFATE 10 MG: 50 INJECTION INTRAVENOUS at 07:38

## 2021-05-11 RX ADMIN — SODIUM CHLORIDE, POTASSIUM CHLORIDE, SODIUM LACTATE AND CALCIUM CHLORIDE: 600; 310; 30; 20 INJECTION, SOLUTION INTRAVENOUS at 09:14

## 2021-05-11 RX ADMIN — BUSPIRONE HYDROCHLORIDE 20 MG: 10 TABLET ORAL at 21:41

## 2021-05-11 RX ADMIN — CYCLOBENZAPRINE 10 MG: 10 TABLET, FILM COATED ORAL at 21:41

## 2021-05-11 RX ADMIN — PHENYLEPHRINE HYDROCHLORIDE 100 MCG: 10 INJECTION, SOLUTION INTRAMUSCULAR; INTRAVENOUS; SUBCUTANEOUS at 08:41

## 2021-05-11 RX ADMIN — CEFAZOLIN SODIUM 2000 MG: 2 SOLUTION INTRAVENOUS at 07:29

## 2021-05-11 RX ADMIN — CEFAZOLIN SODIUM 2000 MG: 2 SOLUTION INTRAVENOUS at 18:38

## 2021-05-11 RX ADMIN — PHENYLEPHRINE HYDROCHLORIDE 100 MCG: 10 INJECTION, SOLUTION INTRAMUSCULAR; INTRAVENOUS; SUBCUTANEOUS at 08:35

## 2021-05-11 RX ADMIN — FENTANYL CITRATE 100 MCG: 50 INJECTION INTRAMUSCULAR; INTRAVENOUS at 06:40

## 2021-05-11 RX ADMIN — DESVENLAFAXINE 50 MG: 50 TABLET, EXTENDED RELEASE ORAL at 18:38

## 2021-05-11 RX ADMIN — ACETAMINOPHEN 1000 MG: 500 TABLET ORAL at 18:38

## 2021-05-11 RX ADMIN — PHENYLEPHRINE HYDROCHLORIDE 200 MCG: 10 INJECTION, SOLUTION INTRAMUSCULAR; INTRAVENOUS; SUBCUTANEOUS at 07:32

## 2021-05-11 RX ADMIN — LIDOCAINE HYDROCHLORIDE 2.5 ML: 20 INJECTION, SOLUTION EPIDURAL; INFILTRATION; INTRACAUDAL; PERINEURAL at 07:47

## 2021-05-11 RX ADMIN — SODIUM CHLORIDE, POTASSIUM CHLORIDE, SODIUM LACTATE AND CALCIUM CHLORIDE: 600; 310; 30; 20 INJECTION, SOLUTION INTRAVENOUS at 07:18

## 2021-05-11 RX ADMIN — GLYCOPYRROLATE 0.2 MG: 0.2 INJECTION, SOLUTION INTRAMUSCULAR; INTRAVENOUS at 09:57

## 2021-05-11 RX ADMIN — FLUVOXAMINE MALEATE 100 MG: 100 TABLET, COATED ORAL at 21:41

## 2021-05-11 RX ADMIN — DEXAMETHASONE SODIUM PHOSPHATE 4 MG: 4 INJECTION, SOLUTION INTRAMUSCULAR; INTRAVENOUS at 07:03

## 2021-05-11 RX ADMIN — MIDAZOLAM HYDROCHLORIDE 2 MG: 2 INJECTION, SOLUTION INTRAMUSCULAR; INTRAVENOUS at 06:40

## 2021-05-11 RX ADMIN — VANCOMYCIN HYDROCHLORIDE 1500 MG: 10 INJECTION, POWDER, LYOPHILIZED, FOR SOLUTION INTRAVENOUS at 06:38

## 2021-05-11 RX ADMIN — PROPOFOL 75 MCG/KG/MIN: 10 INJECTION, EMULSION INTRAVENOUS at 07:21

## 2021-05-11 RX ADMIN — PANTOPRAZOLE SODIUM 40 MG: 40 TABLET, DELAYED RELEASE ORAL at 18:38

## 2021-05-11 RX ADMIN — Medication 10 ML: at 21:41

## 2021-05-11 RX ADMIN — PHENYLEPHRINE HYDROCHLORIDE 100 MCG: 10 INJECTION, SOLUTION INTRAMUSCULAR; INTRAVENOUS; SUBCUTANEOUS at 07:30

## 2021-05-11 RX ADMIN — PHENYLEPHRINE HYDROCHLORIDE 100 MCG: 10 INJECTION, SOLUTION INTRAMUSCULAR; INTRAVENOUS; SUBCUTANEOUS at 08:28

## 2021-05-11 RX ADMIN — GLYCOPYRROLATE 0.2 MG: 0.2 INJECTION INTRAMUSCULAR; INTRAVENOUS at 07:29

## 2021-05-11 RX ADMIN — ACETAMINOPHEN 1000 MG: 500 TABLET ORAL at 21:41

## 2021-05-11 RX ADMIN — PHENYLEPHRINE HYDROCHLORIDE 200 MCG: 10 INJECTION, SOLUTION INTRAMUSCULAR; INTRAVENOUS; SUBCUTANEOUS at 08:25

## 2021-05-11 ASSESSMENT — PAIN DESCRIPTION - DESCRIPTORS
DESCRIPTORS: OTHER (COMMENT);PATIENT UNABLE TO DESCRIBE
DESCRIPTORS: OTHER (COMMENT)
DESCRIPTORS: ACHING;DISCOMFORT
DESCRIPTORS: ACHING

## 2021-05-11 ASSESSMENT — PULMONARY FUNCTION TESTS
PIF_VALUE: 1
PIF_VALUE: 0
PIF_VALUE: 1
PIF_VALUE: 0
PIF_VALUE: 1
PIF_VALUE: 0
PIF_VALUE: 1
PIF_VALUE: 0
PIF_VALUE: 1
PIF_VALUE: 0
PIF_VALUE: 1

## 2021-05-11 ASSESSMENT — PAIN DESCRIPTION - PAIN TYPE
TYPE: SURGICAL PAIN
TYPE: SURGICAL PAIN

## 2021-05-11 ASSESSMENT — PAIN DESCRIPTION - ORIENTATION
ORIENTATION: LEFT
ORIENTATION: LEFT

## 2021-05-11 ASSESSMENT — PAIN DESCRIPTION - ONSET: ONSET: ON-GOING

## 2021-05-11 ASSESSMENT — PAIN DESCRIPTION - FREQUENCY
FREQUENCY: CONTINUOUS
FREQUENCY: CONTINUOUS

## 2021-05-11 ASSESSMENT — PAIN SCALES - GENERAL
PAINLEVEL_OUTOF10: 0
PAINLEVEL_OUTOF10: 4
PAINLEVEL_OUTOF10: 3
PAINLEVEL_OUTOF10: 0
PAINLEVEL_OUTOF10: 7

## 2021-05-11 ASSESSMENT — PAIN - FUNCTIONAL ASSESSMENT
PAIN_FUNCTIONAL_ASSESSMENT: 0-10
PAIN_FUNCTIONAL_ASSESSMENT: PREVENTS OR INTERFERES SOME ACTIVE ACTIVITIES AND ADLS

## 2021-05-11 NOTE — ANESTHESIA PROCEDURE NOTES
Epidural Block    Patient location during procedure: pre-op  Start time: 5/11/2021 6:40 AM  End time: 5/11/2021 6:54 AM  Staffing  Performed: anesthesiologist   Anesthesiologist: Orbie Barthel, MD  Preanesthetic Checklist  Completed: patient identified, IV checked, site marked, risks and benefits discussed, surgical consent, monitors and equipment checked, pre-op evaluation, timeout performed, anesthesia consent given, oxygen available and patient being monitored  Epidural  Patient position: sitting  Prep: ChloraPrep  Patient monitoring: cardiac monitor, continuous pulse ox and frequent blood pressure checks  Approach: midline  Location: lumbar (1-5)  Injection technique: RAINER air  Provider prep: mask and sterile gloves  Needle  Needle type: Tuohy   Needle gauge: 17 G  Needle length: 3.5 in  Needle insertion depth: 5 cm  Catheter type: side hole  Catheter size: 19 G  Catheter at skin depth: 15 cm  Test dose: negative  Kit: Perifix FX Continuous Epidural Anesthesia Tray  Lot number: [id number]  Expiration date: 1/1/2022  Assessment  Hemodynamics: stable  Attempts: 1  Additional Notes  Epidural Note    Seated position. Landmarks identified. Sterile prep and drape. Lidocaine 1% skin wheal at L3-4.  17G Tuohy passed with loss of resistance at 5cm. Catheter passed easily to 15cm. Tuohy removed. Catheter secured. Lidocaine 1.5% with Epinephrine 1:200,000 administered to 5cc total at conclusion of procedure. No apparent complications at the time of the procedure. Giuseppecarmela Thompson MD  May 11, 2021 7:00 AM

## 2021-05-11 NOTE — ANESTHESIA PRE PROCEDURE
Department of Anesthesiology  Preprocedure Note       Name:  Kory Baker   Age:  77 y.o.  :  1955                                          MRN:  [id number]         Date:  5/10/2021      Surgeon: Jacob Ely):  Delmar Lazar MD    Procedure: Procedure(s):  DIRECT ANTERIOR APPROACH LEFT TOTAL HIP ARTHROPLASTY    Medications prior to admission:   Prior to Admission medications    Medication Sig Start Date End Date Taking? Authorizing Provider   levothyroxine (SYNTHROID) 175 MCG tablet Take 1 tablet by mouth Daily 21  Yes Angely Antunez MD   simvastatin (ZOCOR) 40 MG tablet TAKE ONE TABLET BY MOUTH ONCE NIGHTLY 12/15/20  Yes Angely Antunez MD   losartan (COZAAR) 50 MG tablet TAKE ONE TABLET BY MOUTH DAILY 12/15/20  Yes Angely Antunez MD   Probiotic Product (ALIGN PO) Take by mouth   Yes Historical Provider, MD   fluvoxaMINE (LUVOX) 100 MG tablet nightly  18  Yes Historical Provider, MD   pantoprazole (PROTONIX) 20 MG tablet Take 40 mg by mouth daily    Yes Historical Provider, MD   ONE TOUCH ULTRA TEST strip TEST ONCE DAILY 13  Yes Angely Antunez MD   [facility] MISC USE AS DIRECTED 13  Yes Angely Antunez MD   busPIRone (BUSPAR) 10 MG tablet Take 1 tablet by mouth 2 times daily. Patient taking differently: 20 mg nightly 3 tablets at night 13  Yes Angely Antunez MD   desvenlafaxine (PRISTIQ) 50 MG TB24 Take 50 mg by mouth daily. Yes Historical Provider, MD   vitamin D3 (CHOLECALCIFEROL) 10 MCG (400 UNIT) TABS tablet Take 1 tablet by mouth daily    Historical Provider, MD   cyclobenzaprine (FLEXERIL) 10 MG tablet Take 1 tablet by mouth 3 times daily as needed for Muscle spasms 21   Angely Antunez MD       Current medications:    No current facility-administered medications for this encounter.       Current Outpatient Medications   Medication Sig Dispense Refill    levothyroxine (SYNTHROID) 175 MCG tablet Take 1 tablet by mouth Daily 90 tablet 1    simvastatin (ZOCOR) 40 MG tablet TAKE ONE TABLET BY MOUTH ONCE NIGHTLY 90 tablet 2    losartan (COZAAR) 50 MG tablet TAKE ONE TABLET BY MOUTH DAILY 90 tablet 2    Probiotic Product (ALIGN PO) Take by mouth      fluvoxaMINE (LUVOX) 100 MG tablet nightly       pantoprazole (PROTONIX) 20 MG tablet Take 40 mg by mouth daily       ONE TOUCH ULTRA TEST strip TEST ONCE DAILY 30 strip 6    ONETOUCH DELICA LANCETS MISC USE AS DIRECTED 4 each 6    busPIRone (BUSPAR) 10 MG tablet Take 1 tablet by mouth 2 times daily. (Patient taking differently: 20 mg nightly 3 tablets at night) 60 tablet 4    desvenlafaxine (PRISTIQ) 50 MG TB24 Take 50 mg by mouth daily.  vitamin D3 (CHOLECALCIFEROL) 10 MCG (400 UNIT) TABS tablet Take 1 tablet by mouth daily      cyclobenzaprine (FLEXERIL) 10 MG tablet Take 1 tablet by mouth 3 times daily as needed for Muscle spasms 21 tablet 0       Allergies:     Allergies   Allergen Reactions    Codeine Nausea Only    Nsaids      Kidney issues       Problem List:    Patient Active Problem List   Diagnosis Code    Hypothyroidism E03.9    Esophageal reflux K21.9    Allergic rhinitis J30.9    Bipolar affective disorder ([facility] [address] [address] F31.9    Palpitations R00.2    Bradycardia R00.1    Hypertension I10    Hyperlipidemia E78.5    Osteoarthritis, hip, bilateral M16.0    Paresthesias R20.2    Chronic neck pain M54.2, G89.29    Arthritis of right shoulder region M19.011    Rotator cuff tendonitis M75.80    Type 2 diabetes mellitus without complication, without long-term current use of insulin ([facility]) E11.9    Morbidly obese ([facility]) E66.01    Arthritis of right hip M16.11       Past Medical History:        Diagnosis Date    Allergic rhinitis     Anxiety     Arthritis     Bipolar Disorder     Diabetes mellitus ([facility])     Prediabetic    GERD (gastroesophageal reflux disease)     Hearing loss     HYPERCHOLESTERAEMIA     Hypertension     Hypothyroid     Kidney disease     Nephritis     OCD (obsessive compulsive disorder)     PTSD     Screening PSA (prostate specific antigen) 1.3.11    result - 2.91    Sleep apnea     does not use cpap       Past Surgical History:        Procedure Laterality Date    COLONOSCOPY  4.27.05    normal, Dr. Stacey Higgins COLONOSCOPY  5/16/14    normal, rhoades    EXTERNAL AUDITORY CANAL RECONSTRUCTION      left 1980 and 1995    LYMPH NODE BIOPSY  4/27/2010    removed groin, benign, dr Connie Reina      gum surgery     TONSILLECTOMY      TOTAL HIP ARTHROPLASTY Right 9/15/2020    RIGHT DIRECT ANTERIOR APPROACH TOTAL HIP ARTHROPLASTY performed by Marco Torres MD at [address]  5/16/14    Daniel moore, check in one year    UPPER GASTROINTESTINAL ENDOSCOPY  6/1/15    Daniel Guajardo, check one year    UPPER GASTROINTESTINAL ENDOSCOPY  06/13/2018    oscar Guajardo, check in 3 years       Social History:    Social History     Tobacco Use    Smoking status: Never Smoker    Smokeless tobacco: Never Used   Substance Use Topics    Alcohol use: No     Alcohol/week: 0.0 standard drinks                                Counseling given: Not Answered      Vital Signs (Current):   Vitals:    05/07/21 0900   Weight: 200 lb (90.7 kg)   Height: 5' 3\" (1.6 m)                                              BP Readings from Last 3 Encounters:   04/29/21 128/80   02/23/21 128/84   02/19/21 128/84       NPO Status:                                                                                 BMI:   Wt Readings from Last 3 Encounters:   04/29/21 203 lb (92.1 kg)   02/23/21 205 lb (93 kg)   02/19/21 202 lb (91.6 kg)     Body mass index is 35.43 kg/m².     CBC:   Lab Results   Component Value Date    WBC 5.2 09/04/2020    RBC 4.73 09/04/2020    HGB 10.2 09/16/2020    HCT 30.3 09/16/2020    MCV 87.8 09/04/2020    RDW 14.0 09/04/2020     09/04/2020       CMP:   Lab Results   Component Value Date     04/29/2021    K 5.4 04/29/2021     04/29/2021    CO2 30 04/29/2021    BUN 24 04/29/2021    CREATININE 1.2 04/29/2021    GFRAA >60 04/29/2021    GFRAA >60 05/07/2013    AGRATIO 1.7 04/29/2021    LABGLOM >60 04/29/2021    GLUCOSE 102 04/29/2021    PROT 6.9 04/29/2021    PROT 7.0 02/05/2013    CALCIUM 9.5 04/29/2021    BILITOT 0.5 04/29/2021    ALKPHOS 62 04/29/2021    AST 15 04/29/2021    ALT 23 04/29/2021       POC Tests: No results for input(s): POCGLU, POCNA, POCK, POCCL, POCBUN, POCHEMO, POCHCT in the last 72 hours. Coags:   Lab Results   Component Value Date    PROTIME 12.1 04/29/2021    INR 1.04 04/29/2021    APTT 29.3 04/29/2021       HCG (If Applicable): No results found for: PREGTESTUR, PREGSERUM, HCG, HCGQUANT     ABGs: No results found for: PHART, PO2ART, PKG8GFU, OZV5TYO, BEART, A0FCMNJR     Type & Screen (If Applicable):  No results found for: LABABO, LABRH    Drug/Infectious Status (If Applicable):  No results found for: HIV, HEPCAB    COVID-19 Screening (If Applicable):   Lab Results   Component Value Date    COVID19 Not Detected 05/06/2021    COVID19 NOT DETECTED 09/09/2020           Anesthesia Evaluation  Patient summary reviewed and Nursing notes reviewed no history of anesthetic complications:   Airway: Mallampati: II  TM distance: >3 FB   Neck ROM: full  Mouth opening: > = 3 FB Dental:          Pulmonary:   (+) sleep apnea:                             Cardiovascular:    (+) hypertension:,                   Neuro/Psych:               GI/Hepatic/Renal:   (+) GERD:,           Endo/Other:    (+) DiabetesType II DM, , hypothyroidism::., .                 Abdominal:           Vascular:                                        Anesthesia Plan      epidural     ASA 1    (78-year-old male presents for left total hip arthroplasty. Plan epidural anesthesia with I.V. sedation as needed. ASA standard monitors. Patient was given the opportunity to ask question and is in agreement with the anesthetic plan.)  Induction: intravenous. Anesthetic plan and risks discussed with patient. Plan discussed with CRNA.     Attending anesthesiologist reviewed and agrees with Pre Eval content      Joe Snow MD   5/10/2021

## 2021-05-11 NOTE — PROGRESS NOTES
Current Allergies: Codeine and Nsaids    Recent Labs     05/11/21  0636 05/11/21  0924   POCGLU 115* 146*       Admitted to PACU bed 9 from OR. Arrived on a stretcher . Attached to PACU monitoring system. Alarms and parameters set. Report received from anesthesia personnel [address] [place] [address]. OR staff did not report skin issues that were observed while in OR  No problems reported intraoperatively. Pt arrived with oxygen per nasal cannula with oxygen at 3 liters. Oral airway in place  Athrombic wraps in place, earnestine hose on non operative leg.      DIRECT ANTERIOR APPROACH LEFT TOTAL HIP ARTHROPLASTY     Doctors aware of all labs before coming to recovery

## 2021-05-11 NOTE — PROGRESS NOTES
Physical Therapy    Facility/Department: [address] [place] [address] GENERAL SURGERY  Initial Assessment    NAME: Ml Xiao  : 1955  MRN: [id number]    Date of Service: 2021    Discharge Recommendations:Aneudy Cotter scored a 13/24 on the AM-PAC short mobility form. Current research shows that an AM-PAC score of 18 or greater is typically associated with a discharge to the patient's home setting. Please see assessment section for further patient specific details. If patient discharges prior to next session this note will serve as a discharge summary. Please see below for the latest assessment towards goals. PT Equipment Recommendations  Equipment Needed: No    Assessment   Assessment: 78 yo admitted 21 for L THR per Dr. Daryle Legato. Pt seen in PACU and he was unable to tolerate session and assisted back to bed. Pt demo lethargy/hypotension with activity and safety concerns for him to return home today. Pt plans to go home at discharge. Anticipate he will progress well when able to fully participate in PT. If home at discharge, recommend 24 hour assist, use of rolling walker (has) at all times. Will defer post op PT needs to Dr. Daryle Legato. Treatment Diagnosis: mobility impairment due to L THR  Decision Making: Medium Complexity  Patient Education: Pt educated on PT role, importance of OOB mobility, need to call for assist to get up, anterior hip precautions (verbal and in writing) and he was unable to verbalize understanding and will need reinforcement. REQUIRES PT FOLLOW UP: Yes  Activity Tolerance  Activity Tolerance: Patient limited by endurance;Treatment limited secondary to medical complications (free text)  Activity Tolerance: Pt hypotensive with activity this date and unable to tolerate increased ambulation/step assessment.        Patient Diagnosis(es): The encounter diagnosis was Arthritis of left hip.     has a past medical history of Allergic rhinitis, Anxiety, Arthritis, Bipolar Disorder, Diabetes mellitus ([facility] [address] GERD (gastroesophageal reflux disease), Hearing loss, HYPERCHOLESTERAEMIA, Hypertension, Hypothyroid, Kidney disease, Nephritis, OCD (obsessive compulsive disorder), PTSD, Screening PSA (prostate specific antigen), and Sleep apnea. has a past surgical history that includes external auditory canal reconstruction; Tonsillectomy; lymph node biopsy (4/27/2010); Colonoscopy (4.27.05); Colonoscopy (5/16/14); Upper gastrointestinal endoscopy (5/16/14); Upper gastrointestinal endoscopy (6/1/15); Upper gastrointestinal endoscopy (06/13/2018); Mouth surgery; and Total hip arthroplasty (Right, 9/15/2020). Restrictions  Position Activity Restriction  Other position/activity restrictions: up with assist, WBAT L, anterior hip precautions     Vision/Hearing  Vision: Within Functional Limits  Hearing: Within functional limits       Subjective  General  Chart Reviewed: Yes  Additional Pertinent Hx: 76 yo admitted to observation 5/11/21 for L THR per Dr. Otis Ruiz. Pmhx: DM, OCD, bipolar, HTN, R THR 09/20. Family / Caregiver Present: No  Diagnosis: L THR  Follows Commands: Within Functional Limits  Subjective  Subjective: Pt found supine on stretcher and agreeable to PT. \"I feel a little woozy.  \"  Pain Screening  Patient Currently in Pain: Yes(rates L hip pain 4/10, RN aware)         Orientation  Orientation  Overall Orientation Status: Within Functional Limits     Social/Functional History  Social/Functional History  Lives With: Spouse(wife retired)  Type of Home: (condo)  Home Layout: One level  Home Access: (2 to enter without rail; 15 steps down with rail)  Bathroom Shower/Tub: Walk-in shower  Bathroom Toilet: Handicap height(RTS with rails)  Bathroom Equipment: Grab bars in [address] [place], [place] [address]: Rolling walker, Cane, Reacher  ADL Assistance: [address]: (shares with wife)  Ambulation Assistance: Independent  Transfer Assistance: Independent  Active : Yes  Occupation: Retired  Type of occupation: factory work  Leisure & Hobbies: walks dog  Additional Comments: Pt denies any recent falls. Objective          AROM RLE (degrees)  RLE AROM: (hip flexion to 90 degrees; knee/ankle WFL)  AROM LLE (degrees)  LLE AROM : WFL           Bed mobility  Supine to Sit: 2 Person assistance(mod assist x2 with max vc; slow and effortful)  Sit to Supine: 2 Person assistance(max assist x2 with max vc; effortful)  Scooting: Stand by assistance     Transfers  Sit to Stand: Minimal Assistance(max vc for hand placement)  Stand to sit: Minimal Assistance(vc for safety)     Ambulation  Device: Rolling Walker  Assistance: Minimal assistance  Quality of Gait: slow low with decreased stance time on R LE; decreased NIKHIL/step length; WBAT L  Distance: 3 steps to head of bed  Comments: Pt too drowsy/lethargic to continue. B/P 133/80 supine  upon arrival; 110/73 sitting; 95/64 standing then 168/72 after 5-6 mins in supine.               Plan   Plan  Times per week: 7  Times per day: Twice a day  Current Treatment Recommendations: Transfer Training, Endurance Training, Stair training, Functional Mobility Training, Gait Training  Safety Devices  Type of devices: Nurse notified, Call light within reach, Left in bed(no bed alarm in PACU-RN aware)                          AM-PAC Score  AM-PAC Inpatient Mobility Raw Score : 13 (05/11/21 1403)  AM-PAC Inpatient T-Scale Score : 36.74 (05/11/21 1403)  Mobility Inpatient CMS 0-100% Score: 64.91 (05/11/21 1403)  Mobility Inpatient CMS G-Code Modifier : CL (05/11/21 1403)          Goals  Short term goals  Time Frame for Short term goals: discharge  Short term goal 1: sit to/from supine supervision  Short term goal 2: sit to/from stand supervision  Short term goal 3: ambulate 50 ft with rolling walker supervision  Short term goal 4: up/down curb step with AD supervision  Patient Goals   Patient goals : return home       Therapy Time   Individual Concurrent Group Co-treatment   Time In 1300         Time Out 1340         Minutes 40           Timed Code Treatment Minutes:30       Total Treatment Minutes:  40       Lemuel Arambula, PT

## 2021-05-11 NOTE — ANESTHESIA POSTPROCEDURE EVALUATION
Department of Anesthesiology  Postprocedure Note    Patient: Shirin Garcia  MRN: [id number]  YOB: 1955  Date of evaluation: 5/11/2021  Time:  12:14 PM     Procedure Summary     Date: 05/11/21 Room / Location: [address] [place] [address] [address] [place] [address] / [facility]    Anesthesia Start: 5586 Anesthesia Stop: 9007    Procedure: DIRECT ANTERIOR APPROACH LEFT TOTAL HIP ARTHROPLASTY (Left Hip) Diagnosis:       Osteoarthritis of left hip, unspecified osteoarthritis type      (Osteoarthritis of left hip, unspecified osteoarthritis type [M16.12])    Surgeons: Ebenezer Jacobsen MD Responsible Provider: Chris Hidalgo MD    Anesthesia Type: epidural ASA Status: 3          Anesthesia Type: epidural    Marisabel Phase I: Marisabel Score: 8    Marisabel Phase II:      Last vitals: Reviewed and per EMR flowsheets.        Anesthesia Post Evaluation    Patient location during evaluation: PACU  Patient participation: complete - patient participated  Level of consciousness: awake and alert  Pain score: 5  Airway patency: patent  Nausea & Vomiting: no nausea and no vomiting  Complications: no  Cardiovascular status: blood pressure returned to baseline  Respiratory status: acceptable  Hydration status: euvolemic

## 2021-05-11 NOTE — PROGRESS NOTES
Pt admitted to room 5513. VSS. A/O x4. 4-eye assessment completed. Silver foam to L hip is CDI. Pt states his pain at 4/10 but manageable. Pt denies numbness or tingling. Fall precautions in place. Will continue to monitor.

## 2021-05-11 NOTE — H&P
Shirin Goffs    [id number]    [facility] Same Day Surgery Update H & P  Department of General Surgery   Surgical Service   Pre-operative History and Physical  Last H & P within the last 30 days. DIAGNOSIS:   Osteoarthritis of left hip, unspecified osteoarthritis type [M16.12]    Procedure(s):  DIRECT ANTERIOR APPROACH LEFT TOTAL HIP ARTHROPLASTY    HISTORY OF PRESENT ILLNESS:   Patient is a 77 y.o. male with chronic left hip pain and limited ROM in the setting of arthrosis. The symptoms have been recalcitrant to conservative treatment and the patient presents today for the above procedure. Covid 19:  Patient denies fever, chills, cough or known exposure to Covid-19.        Past Medical History:        Diagnosis Date    Allergic rhinitis     Anxiety     Arthritis     Bipolar Disorder     Diabetes mellitus ([address]     Prediabetic    GERD (gastroesophageal reflux disease)     Hearing loss     HYPERCHOLESTERAEMIA     Hypertension     Hypothyroid     Kidney disease     Nephritis     OCD (obsessive compulsive disorder)     PTSD     Screening PSA (prostate specific antigen) 1.3.11    result - 2.91    Sleep apnea     does not use cpap     Past Surgical History:        Procedure Laterality Date    COLONOSCOPY  4.27.05    normal, Dr. Glenda Adams COLONOSCOPY  5/16/14    normal, rhoades    EXTERNAL AUDITORY CANAL RECONSTRUCTION      left 1980 and 1995    LYMPH NODE BIOPSY  4/27/2010    removed groin, benign, dr Shannon Brewster      gum surgery     TONSILLECTOMY      TOTAL HIP ARTHROPLASTY Right 9/15/2020    RIGHT DIRECT ANTERIOR APPROACH TOTAL HIP ARTHROPLASTY performed by Ebenezer Jacobsen MD at [address]  5/16/14    Harjinder moore, check in one year    UPPER GASTROINTESTINAL ENDOSCOPY  6/1/15    Harjinder Ashley, check one year    UPPER GASTROINTESTINAL ENDOSCOPY  06/13/2018    oscar Ashley, check in 3 years     Past Social 9/14/13  Yes Angely Antunez MD   [facility] MISC USE AS DIRECTED 9/5/13  Yes Angely Antunez MD   busPIRone (BUSPAR) 10 MG tablet Take 1 tablet by mouth 2 times daily. Patient taking differently: 20 mg nightly 3 tablets at night 1/22/13  Yes Angely Antunez MD   desvenlafaxine (PRISTIQ) 50 MG TB24 Take 50 mg by mouth daily. Yes Historical Provider, MD   vitamin D3 (CHOLECALCIFEROL) 10 MCG (400 UNIT) TABS tablet Take 1 tablet by mouth daily    Historical Provider, MD   cyclobenzaprine (FLEXERIL) 10 MG tablet Take 1 tablet by mouth 3 times daily as needed for Muscle spasms 2/19/21   Angely Antunez MD         Allergies:  Codeine and Nsaids    PHYSICAL EXAM:      /81   Pulse (!) 44   Temp 97.8 °F (36.6 °C) (Temporal)   Resp 17   Ht 5' 3\" (1.6 m)   Wt 200 lb (90.7 kg)   SpO2 97%   BMI 35.43 kg/m²      Airway:  Airway patent with no audible stridor    Heart:  Bradycardic, Regular rhythm, No murmur noted    Lungs:  No increased work of breathing, good air exchange, clear to auscultation bilaterally, no crackles or wheezing    Abdomen:  Soft, non-distended, non-tender, normal active bowel sounds, no masses palpated    ASSESSMENT AND PLAN    Patient is a 77 y.o. male with above specified procedure planned. 1.  The patients history and physical was obtained and signed off by the pre-admission testing department. Patient seen and focused exam done today- no new changes since last physical exam on 4/29/21    2. Access to ancillary services are available per request of the provider.     Eduard Sorto, TOBY - SILVIO     5/11/2021

## 2021-05-11 NOTE — PROGRESS NOTES
4 Eyes Admission Assessment     I agree as the admission nurse that 2 RN's have performed a thorough Head to Toe Skin Assessment on the patient. ALL assessment sites listed below have been assessed on admission. Areas assessed by both nurses:   [x]   Head, Face, and Ears   [x]   Shoulders, Back, and Chest  [x]   Arms, Elbows, and Hands   [x]   Coccyx, Sacrum, and Ischium  [x]   Legs, Feet, and Heels        Does the Patient have Skin Breakdown?   No         Francisco Javier Prevention initiated:  No   Wound Care Orders initiated:  No      [facility] nurse consulted for Pressure Injury (Stage 3,4, Unstageable, DTI, NWPT, and Complex wounds) or Francisco Javier score 18 or lower:  No      Nurse 1 eSignature: Electronically signed by Lawrence Lynn RN on 5/11/21 at 5:50 PM EDT    **SHARE this note so that the co-signing nurse is able to place an eSignature**    Nurse 2 eSignature: Electronically signed by Nurys Mukherjee RN on 5/11/21 at 11:24 PM EDT

## 2021-05-11 NOTE — PROGRESS NOTES
PACU Transfer to Floor Note  DIRECT ANTERIOR APPROACH LEFT TOTAL HIP ARTHROPLASTY     Current Allergies: Codeine and Nsaids    Pt meets criteria as per Stephanie Score and ASPAN Standards to transfer to next phase of care. Recent Labs     05/11/21  0636 05/11/21  0924   POCGLU 115* 146*       Vitals:    05/11/21 1530   BP: (!) 156/74   Pulse: 63   Resp: 15   Temp: 97.7 °F (36.5 °C)   SpO2: 94%     Vitals within 20% of pt's admission vitals as per STEPHANIE SCORE    SpO2: 94 %    O2 Flow Rate (L/min): 1 L/min      Intake/Output Summary (Last 24 hours) at 5/11/2021 1541  Last data filed at 5/11/2021 1400  Gross per 24 hour   Intake 2000 ml   Output 300 ml   Net 1700 ml       Pain assessment:  receiving treatment    Pain Level: (for therapy and go home on)    Patient was assessed for alterations to skin integrity. There were not alterations observed. Is patient incontinent: no    Handoff report given at bedside.  To [employer] 5 tower  Family updated and going home declined going to floor      5/11/2021 3:41 PM

## 2021-05-11 NOTE — OP NOTE
PREOPERATIVE DIAGNOSIS: Left hip arthritis. POSTOPERATIVE DIAGNOSIS: Left hip arthritis. OPERATION PERFORMED: Left total hip arthroplasty, direct anterior supine under  fluoroscopic guidance. ATTENDING SURGEON: Ruperto Moore MD    FIRST SURGICAL ASSISTANT: Sanchez Burnett [facility]. The Physician Assistant was necessary to perform the surgery today by assisting with application of implants and manipulation of the extremity. This help was not otherwise available in the operating room today. ANESTHESIA: Epidural plus 40 cc of 0.25% Bupivacaine Periarticular Injection     ESTIMATED BLOOD LOSS: 300 mL. INTRAVENOUS FLUIDS: 1000 mL crystalloid. URINE OUTPUT: 0.    ANTIBIOTICS: 2G Cefazolin + 1G Vancomycin     COMPLICATIONS: None. IMPLANTS:   1. Zenaida Trident II 50 mm outer diameter acetabular shell. 2. [place] X3 polyethylene acetabular liner, 36 mm inner diameter neutral.  3. Smith and Nephew Polar femoral stem 2 standard with collar. 4. Oxinium femoral head 36 mm outer diameter +8 offset     OPERATIVE INDICATIONS: This is a patient with longstanding  hip pain. They had radiographic evidence of hip arthritis. They were initially managed with nonoperative measures. After failing non-operative management, total hip arthroplasty was discussed with the patient. We discussed the risk adherent  to the anterior approach including but not limited to injury from on the [place]  table, anterior rather than posterior dislocation, damage to the anterior  structures. General risk of total hip arthroplasty including to dislocation,  neurovascular damage, infection, need for further surgery or even loss of life  or limb were discussed. Despite these risks and others, the patient elected  to proceed. OPERATIVE DETAILS: The patient was greeted in the preoperative holding area. The operative hip was marked with a marker. They were brought to the operating room  where general anesthesia was obtained. They were placed on the [place] table with feet in the boots and appropriately positioned against the post. The arms  were placed on the arm boards. All bony prominences were well padded. The anterior portion of the hip was prepped and draped in normal standard sterile surgical fashion. A final timeout was performed, verifying correct patient, operative site and  operative plan. The patient did receive antibiotics prior to surgical incision. They also received 1 g of tranexamic acid prior to surgical incision. I began by making an incision just lateral and distal to the anterior superior  iliac spine heading just laterally towards the lateral patellar. This was made through the skin through the subcutaneous  tissue, identifying the fascia overlying the tensor fascia niecy. The fascia  was divided in line with its fibers. I placed an Allis clamp on the fascia  anteriorly and dissected over top of the tensor fascia niecy, meeting the  sartorial fascia and then diving over top of the femoral neck. A cobra  retractor was placed medially over the neck and then laterally over the neck. I brought x-ray in, verified my position. I then divided through the fascia  overlying the anterior hip, coagulating all bleeders including the circumflex  vessel. I made a capsulotomy in a T-type fashion and tagged them with #1  Vicryl for retraction and later closure. I placed my cobra retractors  intraarticularly, then placed a Bovie caesar on the femoral neck for my planned  femoral osteotomy, placed the saw in place at the Bovie caesar, and brought  fluoroscopy in and verified my position. I made my osteotomy and then using a  corkscrew, was able to remove the femoral head. At this point, I externally  rotated the femur, dropping it into extension and adduction, and performed a  release of pubofemoral ligament inferiorly down to the lesser trochanter.  I  brought it back up into a neutral position and I placed 2 retractors  anteriorly and posteriorly for acetabular reaming. Under direct fluoroscopic  guidance, I reamed sequentially to a size 50 and then opened a 50 mm shell and impacted it into place. I grabbed this with a Kocher clamp and was able to clearly  demonstrate that it had excellent fixation and I did not feel it needed screw  fixation. A 36 mm inner diameter liner was opened and impacted into place. I  was pleased with this and I turned my attention to the femur. I placed the hook for the [place] table just superior to the vastus lateralis underneath the  femur, and then elevated it and clamped it into the table. A retractor was  placed over the medial femur, the leg was brought into once again extension  and adduction, and full external rotation to approximately 110 degrees. I was  able to expose the femur. I then released the capsule superiorly in order to  get full femoral exposure, I used the box osteotome, followed by canal finder  and placed a broach into the femur and brought the femur back  up removing my retractors and took a fluoroscopic image to verify that I was  within the canal. I was pleased with this. I reset my exposure and then I  broached with sequentially to a size 2 stem. I calcar planed and had a nice flat cut. I then came down, I trialed this under fluoroscopic guidance. I had good leg length and I opened  the 2 stem. I reset my retraction and my position of my femur. I impacted  the stem to the level of my femoral neck osteotomy. After trialing once again I opened the size +8 head. This was impacted onto a dry Stubbs  taper. I reduced the hip, took fluoroscopic images of the bilateral hips and  the pelvis to verify my leg length. I was pleased with leg length and offset. I dropped the hip into extension and external rotation and it did not  dislocate on the table. I was pleased with this and brought it back up into  neutral, took all traction off.     The wound was soaked in a dilute betadine solution followed by pulsatile irrigation with 3 L of sterile saline with bacitracin. I then turned my attention to closure of the wound. The fascia of the TFL was closed with interrupted 0 vicryl oversewn with a running #2 stratafix suture. A local anesthetic mixture was injected into the muscle, deep and subcutaneous tissue followed by 0 vicryl in the subcutaneous tissue. 2-0 vicryl was placed in a buried interrupted fashion followed by a running subcuticular 4-0 monocryl in the subdermal layer. Surgical adhesive was placed on the incision. A sterile silver impregnated occlusive was placed over the incision. The patient was extubated, transferred to a hospital bed and transported to the post-operative anesthesia care unit in stable condition. All sponge and needle counts were correct x 2.

## 2021-05-11 NOTE — PROGRESS NOTES
Screening PSA (prostate specific antigen), and Sleep apnea. has a past surgical history that includes external auditory canal reconstruction; Tonsillectomy; lymph node biopsy (4/27/2010); Colonoscopy (4.27.05); Colonoscopy (5/16/14); Upper gastrointestinal endoscopy (5/16/14); Upper gastrointestinal endoscopy (6/1/15); Upper gastrointestinal endoscopy (06/13/2018); Mouth surgery; and Total hip arthroplasty (Right, 9/15/2020). Treatment Diagnosis: impaired ADLs, transfers      Restrictions  Position Activity Restriction  Other position/activity restrictions: up with assist, WBAT L, anterior hip precautions    Subjective   General  Chart Reviewed: Yes  Additional Pertinent Hx: PMH:  DM, HTN, hyypothyroidism, anxiety, PTSD, GERD, OCD, bipolar, R THR  Family / Caregiver Present: No  Referring Practitioner: Dr. Melodie Smith  Diagnosis: Pt admitted with L hip arthritis, s/p L THR  Subjective  Subjective: Pt POD 0 pt, evaluated in PACU. Pt on stretcher, stating he felt weak, dizzy after bed mobility. Patient Currently in Pain: Yes(rates L hip pain 4/10, RN aware)    Social/Functional History  Social/Functional History  Lives With: Spouse(wife retired)  Type of Home: (condo)  Home Layout: One level  Home Access: (2 to enter without rail; 15 steps down with rail)  Bathroom Shower/Tub: Walk-in shower  Bathroom Toilet: Handicap height(RTS with rails)  Bathroom Equipment: Grab bars in [address]: Rolling walker, Cane, Reacher  ADL Assistance: Independent  Homemaking Assistance: (shares with wife)  Ambulation Assistance: Independent  Transfer Assistance: Independent  Active : Yes  Occupation: Retired  Type of occupation: factory work  Leisure & Hobbies: walks dog  Additional Comments: Pt denies any recent falls.        Objective        Orientation  Overall Orientation Status: Within Functional Limits     Functional Mobility  Functional Mobility Comments: Functional mobility with rolling walker, side stepping to

## 2021-05-11 NOTE — PROGRESS NOTES
Once in PACU  Patient heart rate luz, had some A fib in OR  First blood pressure good   Started going down and continued to drop  Bolus 500 ml started  Call out to Dr Loren Higgins ordered and given  VS stable remains on oxygen  Gave PRN IV Dilaudid for increased pain   Oxygen saturations dropped, Patient has sleep apnea  Placed on Non-Re Breather at 15 liters   Saturation stable placed back on the nasal cannula at 5 liters  Spoke with wife with update   [facility] at bedside gave up date as to plan of action  Waiting for Patient to be weaned off of oxygen to ask for PT & OT to see    Call out to PT / OT to see Patient will be here by 1 pm  Weaned off of oxygen   Pain under control

## 2021-05-11 NOTE — CONSULTS
Hospital Medicine Consult     PCP: Raiza Alvarado MD    Date of Admission: 5/11/2021    Date of Service: Pt seen/examined on 05/11/21    Chief Complaint:  L hip arthritis s/p Left total hip arthroplasty    History Of Present Illness:      Zi Bowen is a 77 y.o. male with past medical history as below, including Bipolar, DMII diet controlled, HTN, HLD and hypothyroidism. He is feeling ok post-op. His pain is controlled, says he feels much better then he has after prior surgery. No nausea, vomiting, or constipation. No chest pain or dyspnea.      Past Medical History:          Diagnosis Date    Allergic rhinitis     Anxiety     Arthritis     Bipolar Disorder     Diabetes mellitus ([address]     Prediabetic    GERD (gastroesophageal reflux disease)     Hearing loss     HYPERCHOLESTERAEMIA     Hypertension     Hypothyroid     Kidney disease     Nephritis     OCD (obsessive compulsive disorder)     PTSD     Screening PSA (prostate specific antigen) 1.3.11    result - 2.91    Sleep apnea     does not use cpap       Past Surgical History:          Procedure Laterality Date    COLONOSCOPY  4.27.05    normal, Dr. Ml Rojas COLONOSCOPY  5/16/14    normal, jf    EXTERNAL AUDITORY CANAL RECONSTRUCTION      left 1980 and 1995    LYMPH NODE BIOPSY  4/27/2010    removed groin, benign, dr Aguilar Castle      gum surgery     TONSILLECTOMY      TOTAL HIP ARTHROPLASTY Right 9/15/2020    RIGHT DIRECT ANTERIOR APPROACH TOTAL HIP ARTHROPLASTY performed by Roseline Riggins MD at [address] Left 5/11/2021    DIRECT ANTERIOR APPROACH LEFT TOTAL HIP ARTHROPLASTY performed by Roseline Riggins MD at [place] [address]  5/16/14    Hugh moore, check in one year    UPPER GASTROINTESTINAL ENDOSCOPY  6/1/15    Hugh Gamez check one year    UPPER GASTROINTESTINAL ENDOSCOPY  06/13/2018    oscar Dawkins, check in 3 years       Medications Prior to Admission:      Prior to Admission medications    Medication Sig Start Date End Date Taking? Authorizing Provider   oxyCODONE (ROXICODONE) 5 MG immediate release tablet Take 1 tablet by mouth every 6 hours as needed for Pain for up to 7 days. Intended supply: 7 days. Take lowest dose possible to manage pain 5/11/21 5/18/21 Yes Yohana Lance MD   levothyroxine (SYNTHROID) 175 MCG tablet Take 1 tablet by mouth Daily 1/5/21  Yes Ashli Gautam MD   simvastatin (ZOCOR) 40 MG tablet TAKE ONE TABLET BY MOUTH ONCE NIGHTLY 12/15/20  Yes Ashli Gautam MD   losartan (COZAAR) 50 MG tablet TAKE ONE TABLET BY MOUTH DAILY 12/15/20  Yes Ashli Gautam MD   Probiotic Product (ALIGN PO) Take by mouth   Yes Historical Provider, MD   fluvoxaMINE (LUVOX) 100 MG tablet nightly  11/22/18  Yes Historical Provider, MD   pantoprazole (PROTONIX) 20 MG tablet Take 40 mg by mouth daily    Yes Historical Provider, MD   ONE TOUCH ULTRA TEST strip TEST ONCE DAILY 9/14/13  Yes Ashli Gautam MD   [facility] AS [facility] 9/5/13  Yes Ashli Gautam MD   busPIRone (BUSPAR) 10 MG tablet Take 1 tablet by mouth 2 times daily. Patient taking differently: 20 mg nightly 3 tablets at night 1/22/13  Yes Ashli Gautam MD   desvenlafaxine (PRISTIQ) 50 MG TB24 Take 50 mg by mouth daily. Yes Historical Provider, MD   vitamin D3 (CHOLECALCIFEROL) 10 MCG (400 UNIT) TABS tablet Take 1 tablet by mouth daily    Historical Provider, MD   cyclobenzaprine (FLEXERIL) 10 MG tablet Take 1 tablet by mouth 3 times daily as needed for Muscle spasms 2/19/21   Ashli Gautam MD       Allergies:  Codeine and Nsaids    Social History:      The patient currently lives in private residence    TOBACCO:   reports that he has never smoked. He has never used smokeless tobacco.  ETOH:   reports no history of alcohol use.       Family History:      Reviewed in detail and positive as follows:        Problem Relation Age of Onset    Heart Disease Mother     Heart Disease Father     Lung Cancer Sister     Arthritis Other        REVIEW OF SYSTEMS:   Pertinent positives as noted in the HPI. All other systems reviewed and negative. PHYSICAL EXAM:    /73   Pulse 69   Temp 98.9 °F (37.2 °C) (Oral)   Resp 16   Ht 5' 3\" (1.6 m)   Wt 200 lb (90.7 kg)   SpO2 96%   BMI 35.43 kg/m²     General appearance: No apparent distress, appears stated age and cooperative. Obese. HEENT: Normal cephalic, atraumatic without obvious deformity. Pupils equal, round, and reactive to light. Extra ocular muscles intact. Conjunctivae/corneas clear. Neck: Supple, with full range of motion. No jugular venous distention. Trachea midline. Respiratory:  Normal respiratory effort. Clear to auscultation, bilaterally without Rales/Wheezes/Rhonchi. Cardiovascular: Regular rate and rhythm with normal S1/S2 without murmurs, rubs or gallops. Abdomen: Soft, non-tender, mild distension with normal bowel sounds. Musculoskelatal: No clubbing, cyanosis or edema bilaterally. Full range of motion without deformity. Skin: Skin color, texture, turgor normal.  No rashes or lesions. Neurologic:  Neurovascularly intact without any focal sensory/motor deficits. Cranial nerves: II-XII intact, grossly non-focal.  Psychiatric: Alert and oriented, thought content appropriate, normal insight    Labs:     Recent Labs     05/11/21  0630   WBC 8.9   HGB 12.9*   HCT 39.6*        Recent Labs     05/11/21  0630      K 4.8      CO2 23   BUN 41*   CREATININE 1.3   CALCIUM 9.6     No results for input(s): AST, ALT, BILIDIR, BILITOT, ALKPHOS in the last 72 hours. No results for input(s): INR in the last 72 hours. No results for input(s): [place] in the last 72 hours.     Urinalysis:      Lab Results   Component Value Date    NITRU Negative 04/29/2021    WBCUA 1 02/06/2020    RBCUA 2 02/06/2020    BLOODU Negative 04/29/2021    SPECGRAV 1.026 04/29/2021    GLUCOSEU Negative 04/29/2021 Studies:  XR PELVIS (1-2 VIEWS)   Final Result   1. Left hip arthroplasty appears in anatomic alignment on AP projection. XR HIP 2-3 VW W PELVIS LEFT   Final Result      Total left hip arthroplasty revision. FLUORO FOR SURGICAL PROCEDURES   Final Result      Total left hip arthroplasty revision.           ASSESSMENT:    Active Hospital Problems    Diagnosis Date Noted    Arthritis of left hip [M16.12] 05/11/2021   Bipolar  HTN  DMII diet controlled  Hypothyroidism    PLAN:    HTN  Losartan to start in am    Hypothyroidism  Recent TSH wnl, confirmed home dose    DMII  Not on any medications currently  Hgb A1c 6.9  Adjusted diet    Continue other home medications as ordered    DVT Prophylaxis: per primary  Diet: DIET GENERAL;  Code Status: Full Code    PT/OT Eval Status: pending      [address]

## 2021-05-12 VITALS
RESPIRATION RATE: 16 BRPM | HEIGHT: 63 IN | SYSTOLIC BLOOD PRESSURE: 119 MMHG | DIASTOLIC BLOOD PRESSURE: 56 MMHG | TEMPERATURE: 98 F | HEART RATE: 62 BPM | BODY MASS INDEX: 35.44 KG/M2 | OXYGEN SATURATION: 96 % | WEIGHT: 200 LBS

## 2021-05-12 LAB
HCT VFR BLD CALC: 29.4 % (ref 40.5–52.5)
HEMOGLOBIN: 9.7 G/DL (ref 13.5–17.5)
MRSA SCREEN RT-PCR: NORMAL

## 2021-05-12 PROCEDURE — 36415 COLL VENOUS BLD VENIPUNCTURE: CPT

## 2021-05-12 PROCEDURE — 6360000002 HC RX W HCPCS: Performed by: ORTHOPAEDIC SURGERY

## 2021-05-12 PROCEDURE — 85018 HEMOGLOBIN: CPT

## 2021-05-12 PROCEDURE — 97530 THERAPEUTIC ACTIVITIES: CPT

## 2021-05-12 PROCEDURE — 97116 GAIT TRAINING THERAPY: CPT

## 2021-05-12 PROCEDURE — G0378 HOSPITAL OBSERVATION PER HR: HCPCS

## 2021-05-12 PROCEDURE — 6370000000 HC RX 637 (ALT 250 FOR IP): Performed by: ORTHOPAEDIC SURGERY

## 2021-05-12 PROCEDURE — 85014 HEMATOCRIT: CPT

## 2021-05-12 PROCEDURE — 2580000003 HC RX 258: Performed by: ORTHOPAEDIC SURGERY

## 2021-05-12 PROCEDURE — 97535 SELF CARE MNGMENT TRAINING: CPT

## 2021-05-12 PROCEDURE — 97110 THERAPEUTIC EXERCISES: CPT

## 2021-05-12 RX ADMIN — ACETAMINOPHEN 1000 MG: 500 TABLET ORAL at 06:06

## 2021-05-12 RX ADMIN — ASPIRIN 81 MG: 81 TABLET, COATED ORAL at 08:04

## 2021-05-12 RX ADMIN — CEFAZOLIN SODIUM 2000 MG: 2 SOLUTION INTRAVENOUS at 01:29

## 2021-05-12 RX ADMIN — Medication 10 ML: at 08:05

## 2021-05-12 RX ADMIN — OXYCODONE HYDROCHLORIDE 10 MG: 5 TABLET ORAL at 02:46

## 2021-05-12 RX ADMIN — Medication 1 CAPSULE: at 08:05

## 2021-05-12 RX ADMIN — DOCUSATE SODIUM 50 MG AND SENNOSIDES 8.6 MG 1 TABLET: 8.6; 5 TABLET, FILM COATED ORAL at 08:00

## 2021-05-12 RX ADMIN — LOSARTAN POTASSIUM 50 MG: 50 TABLET, FILM COATED ORAL at 08:04

## 2021-05-12 RX ADMIN — PANTOPRAZOLE SODIUM 40 MG: 40 TABLET, DELAYED RELEASE ORAL at 08:04

## 2021-05-12 RX ADMIN — CHOLECALCIFEROL (VITAMIN D3) 10 MCG (400 UNIT) TABLET 400 UNITS: at 08:04

## 2021-05-12 RX ADMIN — DESVENLAFAXINE 50 MG: 50 TABLET, EXTENDED RELEASE ORAL at 08:04

## 2021-05-12 RX ADMIN — LEVOTHYROXINE SODIUM 175 MCG: 175 TABLET ORAL at 06:06

## 2021-05-12 ASSESSMENT — PAIN SCALES - GENERAL
PAINLEVEL_OUTOF10: 0
PAINLEVEL_OUTOF10: 2
PAINLEVEL_OUTOF10: 0
PAINLEVEL_OUTOF10: 3

## 2021-05-12 ASSESSMENT — PAIN DESCRIPTION - DESCRIPTORS
DESCRIPTORS: ACHING;DISCOMFORT
DESCRIPTORS: ACHING;CONSTANT;DISCOMFORT

## 2021-05-12 ASSESSMENT — PAIN DESCRIPTION - PROGRESSION: CLINICAL_PROGRESSION: GRADUALLY WORSENING

## 2021-05-12 ASSESSMENT — PAIN DESCRIPTION - PAIN TYPE
TYPE: ACUTE PAIN;SURGICAL PAIN
TYPE: ACUTE PAIN;SURGICAL PAIN

## 2021-05-12 ASSESSMENT — PAIN DESCRIPTION - ORIENTATION: ORIENTATION: LEFT

## 2021-05-12 ASSESSMENT — PAIN - FUNCTIONAL ASSESSMENT
PAIN_FUNCTIONAL_ASSESSMENT: PREVENTS OR INTERFERES WITH MANY ACTIVE NOT PASSIVE ACTIVITIES
PAIN_FUNCTIONAL_ASSESSMENT: ACTIVITIES ARE NOT PREVENTED

## 2021-05-12 ASSESSMENT — PAIN DESCRIPTION - LOCATION: LOCATION: HIP

## 2021-05-12 ASSESSMENT — PAIN DESCRIPTION - FREQUENCY
FREQUENCY: CONTINUOUS
FREQUENCY: CONTINUOUS

## 2021-05-12 NOTE — PROGRESS NOTES
Pt discharged. Discharge summary reviewed with pt and wife prior to leaving. All questions and concerns addressed. Belongings gathered. IV removed.

## 2021-05-12 NOTE — PROGRESS NOTES
Patient a/o x4. Vitals stable. Pain treated with oral medication. Tolerating ambulation well overnight with walker and GB. Voiding adequately. Resting well overnight. Incision c/d/i.

## 2021-05-12 NOTE — PROGRESS NOTES
Physical Therapy  Daily Treatment Note    Discharge Recommendations: Sherice Strange scored a 18/24 on the AM-PAC short mobility form. Current research shows that an AM-PAC score of 18 or greater is typically associated with a discharge to the patient's home setting. Based on the patient's AM-PAC score and their current functional mobility deficits, it is recommended that the patient have 2-3 sessions per week of Physical Therapy at d/c to increase the patient's independence. At this time, this patient demonstrates the endurance and safety to discharge home with home vs OP services and a follow up treatment frequency of 2-3x/wk. Please see assessment section for further patient specific details. Assessment:  Pt with good effort and participation. Needing occasional cues to stay on task. Limited by dizziness, nausea, vomiting with activity this AM--unable to practice stairs this session. Plan is for home with wife at D/. Pt familiar with precautions and use of walker from previous surgeries. Would benefit from continued PT at D/ and 24 hour assist initially. No DME needs. Equipment Needs: No new needs (Pt has necessary DME)    Chart Reviewed: Yes     Other position/activity restrictions: up with assist, WBAT L, anterior hip precautions   Additional Pertinent Hx: 76 yo admitted to observation 5/11/21 for L THR per Dr. Laine Brock. Pmhx: DM, OCD, bipolar, HTN, R THR 09/20. Diagnosis: L THR   Treatment Diagnosis: mobility impairment due to L THR    Subjective: Pt in bed initially. Agreeable to working with PT. Hopes to go home today. States he has 2 steps into condo building and 15 steps to his unit. \"I think I threw up on the 1st day after my last surgery, too. \" c/o nausea with ambulation. RN aware and addressed. Pain: 2/10 L hip. Ice pack to area after session.      Objective:    Exercises  10 reps B ankle pumps, quad sets, glut sets, heel slides (mod assist on L), hip abd/add (mod assist); SAQ (min assist) in bed    Bed mobility  Supine to sit: Min assist x 1, HOB flat without railing  Scooting: SBA to EOB    Transfers  Sit to stand: CGA from bed; CGA from chair; CGA from wheelchair. Cues for hand placement. Stand to sit: CGA into chair (twice); CGA into wheelchair. Cues for backing up completely and for hand placement. Ambulation  Assistance Level: CGA  Assistive device: Wheeled walker  Distance: 12 ft x 2, 25 ft, 5 ft. Seated rests between walks. Quality of gait:  Other: Pt with c/o dizziness & nausea with longer walk. Pt sat in wheelchair. Vomited. Then brought to recliner and LEs elevated. BP = 135/56. RN present and aware. Balance  Sat EOB with SBA  Static stance with walker SBA  Ambulation with wheeled walker CGA    Patient Education  Reviewed anterior hip precautions. Pt recalled 1/3 independently. Calling for assist with needs. Expressed understanding. Safety Devices  Pt left with needs in reach. In chair (reclined) with chair alarm on. RN updated.      AM-PAC score  AM-PAC Inpatient Mobility Raw Score : 18  AM-PAC Inpatient T-Scale Score : 43.63  Mobility Inpatient CMS 0-100% Score: 46.58  Mobility Inpatient CMS G-Code Modifier : CK    Goals: (as determined and assessed by primary PT)  Time Frame for Short term goals: discharge  Short term goal 1: sit to/from supine supervision   ongoing  Short term goal 2: sit to/from stand supervision   ongoing  Short term goal 3: ambulate 50 ft with rolling walker supervision  ongoing  Short term goal 4: up/down curb step with AD supervision  ongoing     Plan:  Times per week: 7; Times per day: Twice a day  Current Treatment Recommendations: Transfer Training, Endurance Training, Stair training, Functional Mobility Training, Gait Training    Therapy Time    Individual  Concurrent  Group  Co-treatment    Time In  935            Time Out  1020            Minutes  45              Timed Code Treatment Minutes: 45  Total Treatment Minutes: 45    Will continue per plan in PM. Practice stairs prior to D/C home. If patient is discharged prior to next treatment, this note will serve as the discharge summary. Elayne [place] #[id number]    Addendum: 0 goals met. Continue per POC. This note will serve as a discharge summary if patient is discharged from hospital before next treatment session.    Angie Mosher, PT

## 2021-05-12 NOTE — PROGRESS NOTES
Department of Orthopedic Surgery     Progress Note    Subjective:   Admit Day:5/11/2021    Patient is comfortable appearing. Denies chest pain, shortness of air or calf pain. Tolerating PO. Objective[de-identified]    height is 5' 3\" (1.6 m) and weight is 200 lb (90.7 kg). His oral temperature is 98.2 °F (36.8 °C). His blood pressure is 139/90 (abnormal) and his pulse is 63. His respiration is 16 and oxygen saturation is 96%. General:  No acute distress. Operative Incision:  Dressing is clean, dry and intact. Operative Extremity:  - Motor function intact to Tibialis Anterior, Gastroc-Soleus Complex, Extensor Hallucis Longus, and Flexor Hallucis Longus.  -  Sensation intact to light touch in sural, saphenous, superficial peroneal, deep peroneal and tibial nerve distributions. -  Toes are wwp with a palpable dorsalis pedis pulse. Negative Meche's Sign Bilaterally    Labs:  Lab Results   Component Value Date    WBC 8.9 05/11/2021    HGB 9.7 05/12/2021    HCT 29.4 05/12/2021     05/11/2021       Lab Results   Component Value Date    INR 1.04 04/29/2021       Lab Results   Component Value Date     05/11/2021    K 4.8 05/11/2021    CO2 23 05/11/2021    BUN 41 05/11/2021    CREATININE 1.3 05/11/2021    CALCIUM 9.6 05/11/2021       Assessment:   Principal Problem:    Arthritis of left hip  Resolved Problems:    * No resolved hospital problems. *      POD # 1 s/p OSCAR. Doing well. Pt reports minimal pain, dressing CDI. Pt able to urinate adequately. Will monitor progress with PT and dc home later today. Plan:   1. Weightbearing as tolerated   2. DVT Prophylaxis:  ASA  3. PT/OT per protocol  4. Pain control: per protocol    Disposition:  Home vs SNF pending mobility/progress with therapy and medical stability.

## 2021-05-12 NOTE — PROGRESS NOTES
Physical Therapy  Daily Treatment Note    Discharge Recommendations: Cosme Aguirre scored a 18/24 on the AM-PAC short mobility form. Current research shows that an AM-PAC score of 18 or greater is typically associated with a discharge to the patient's home setting. Based on the patient's AM-PAC score and their current functional mobility deficits, it is recommended that the patient have 2-3 sessions per week of Physical Therapy at d/c to increase the patient's independence. At this time, this patient demonstrates the endurance and safety to discharge home with home vs OP services and a follow up treatment frequency of 2-3x/wk. Please see assessment section for further patient specific details. Assessment:  Pt with much improved mobility this afternoon. No c/o dizziness or nausea this session. Improved endurance. Steady gait with walker. Did well on stairs. Good understanding of anterior hip precautions. Plan is for home today with assist of wife. No DME needs. Equipment Needs: No new needs     Chart Reviewed: Yes     Other position/activity restrictions: up with assist, WBAT L, anterior hip precautions   Additional Pertinent Hx: 78 yo admitted to observation 5/11/21 for L THR per Dr. Maisha Little. Pmhx: DM, OCD, bipolar, HTN, R THR 09/20. Diagnosis: L THR   Treatment Diagnosis: mobility impairment due to L THR    Subjective: Pt in chair initially. Agreeable to working with PT. Feels much better this afternoon. Ready to go home. Pain: 2/10 L hip. Ice pack to area. Objective:    Bed mobility  Supine to sit: Min assist x 1, HOB flat. Sit to Supine: Min assist x 1. HOB flat. Other: Pt states wife helps with bed mobility at home.      Transfers  Sit to stand: SBA from chair; SBA from wheelchair; SBA from bed  Stand to sit: SBA into chair; SBA into wheelchair; SBA onto bed  Bed > chair: SBA with walker    Ambulation  Assistance Level: SBA  Assistive device: Wheeled walker  Distance: 50 ft, 100 ft in olson. Seated rest between walks. Quality of gait: Step-to pattern; moderate reliance on walker for support; decreased pace; steady with walker    Stairs  Up/down 2 steps x 3 (6 total) with B rails CGA. Step-to-step pattern. Steady. Up/down curb step with walker CGA. Other: Cues only initially for sequencing. Exercises  10 reps B ankle pumps, quad sets, glut sets, L heel slides, hip abd/add (min assist), SAQ in supine    Patient Education  Reviewed anterior hip precautions. Recalled 2/3 independently. Demonstrated good understanding with mobility. Stairs negotiation with B rails and curb step negotiation with walker. Demonstrated safely. Calling for assist with needs. Expressed understanding. Safety Devices  Pt left with needs in reach. In chair with chair alarm on. RN updated. AM-PAC score  AM-PAC Inpatient Mobility Raw Score : 18  AM-PAC Inpatient T-Scale Score : 43.63  Mobility Inpatient CMS 0-100% Score: 46.58  Mobility Inpatient CMS G-Code Modifier : CK    Goals: (as determined and assessed by primary PT)  Time Frame for Short term goals: discharge  Short term goal 1: sit to/from supine supervision  ongoing  Short term goal 2: sit to/from stand supervision  ongoing  Short term goal 3: ambulate 50 ft with rolling walker supervision  ongoing  Short term goal 4: up/down curb step with AD supervision  ongoing     Plan:  Times per week: 7; Times per day: Twice a day  Current Treatment Recommendations: Transfer Training, Endurance Training, Stair training, Functional Mobility Training, Gait Training    Therapy Time    Individual  Concurrent  Group  Co-treatment    Time In  1302            Time Out  1345            Minutes  43              Timed Code Treatment Minutes: 43  Total Treatment Minutes: 43    Anticipate D/C home with wife today. Will continue per plan of care if not D/Flex. If patient is discharged prior to next treatment, this note will serve as the discharge summary.     Candice Gooden [place], [place] #[address]    Addendum: 0 goals met. Continue per POC. This note will serve as a discharge summary if patient is discharged from hospital before next treatment session.   Dakota Albright, PT

## 2021-05-12 NOTE — PROGRESS NOTES
Occupational Therapy  Facility/Department: [facility] 5T ORTHO/NEURO  Daily Treatment Note  NAME: Cosme Aguirre  : 1955  MRN: [id number]    Date of Service: 2021    Discharge Recommendations:    Cosme Aguirre scored a 21/24 on the AM-PAC ADL Inpatient form. Current research shows that an AM-PAC score of 18 or greater is typically associated with a discharge to the patient's home setting. Based on the patient's AM-PAC score, and their current ADL deficits, it is recommended that the patient have 2-3 sessions per week of Occupational Therapy at d/c to increase the patient's independence. At this time, this patient demonstrates the endurance and safety to discharge home with 24 hr assist and a follow up treatment frequency of 2-3x/wk. Please see assessment section for further patient specific details. If patient discharges prior to next session this note will serve as a discharge summary. Please see below for the latest assessment towards goals. OT Equipment Recommendations  Equipment Needed: No    Assessment   Performance deficits / Impairments: Decreased functional mobility ; Decreased ADL status; Decreased endurance  Assessment: Pt POD #1 L THR. Pt tolerated therapy session well, SBA for donnning brief/pants and Dependent for sneakers. Pt conveyed his wife assists a lot with ADLs. Recommend pt d/c home with 24 hr assist. Continue with POC. Treatment Diagnosis: impaired ADLs, transfers  Prognosis: Good  OT Education: OT Role;ADL Adaptive Strategies;Transfer Training  Patient Education: pt demonstrated understanding  REQUIRES OT FOLLOW UP: Yes  Activity Tolerance  Activity Tolerance: Patient Tolerated treatment well  Safety Devices  Safety Devices in place: Yes  Type of devices: All fall risk precautions in place;Call light within reach; Chair alarm in place; Left in chair;Nurse notified         Patient Diagnosis(es): The encounter diagnosis was Arthritis of left hip.      has a past medical history of Allergic rhinitis, Anxiety, Arthritis, Bipolar Disorder, Diabetes mellitus ([facility] [address] GERD (gastroesophageal reflux disease), Hearing loss, HYPERCHOLESTERAEMIA, Hypertension, Hypothyroid, Kidney disease, Nephritis, OCD (obsessive compulsive disorder), PTSD, Screening PSA (prostate specific antigen), and Sleep apnea. has a past surgical history that includes external auditory canal reconstruction; Tonsillectomy; lymph node biopsy (4/27/2010); Colonoscopy (4.27.05); Colonoscopy (5/16/14); Upper gastrointestinal endoscopy (5/16/14); Upper gastrointestinal endoscopy (6/1/15); Upper gastrointestinal endoscopy (06/13/2018); Mouth surgery; Total hip arthroplasty (Right, 9/15/2020); and Total hip arthroplasty (Left, 5/11/2021). Restrictions  Position Activity Restriction  Other position/activity restrictions: up with assist, WBAT L, anterior hip precautions  Subjective   General  Chart Reviewed: Yes  Additional Pertinent Hx: PMH:  DM, HTN, hyypothyroidism, anxiety, PTSD, GERD, OCD, bipolar, R THR  Response to previous treatment: Patient with no complaints from previous session  Family / Caregiver Present: No  Referring Practitioner: Dr. Blake Pierre  Diagnosis: Pt admitted with L hip arthritis, s/p L THR  Subjective  Subjective: Pt seated in recliner and agreeable to therapy session with encouragement as pt repeatedly said \"My wife does that for me. \" Hip precautions reviewed with pt. General Comment  Comments: Pt donned t-shirt at setup/Supervision with vcs as pt was donning with RUE through neck opening. Pt donned brief/pants with reacher and SBA in stance to pull up. Pt donned sneakers at Dependent as pt adamantly declined use of [address]. Pt declined toileting and grooming. Pt sit to stand SBA, pt ambulated with rw at SBA ~100 ft in hallway, pt stand to sit SBA for seated rest break. Pt sit to stand SBA and ambulated ~100 ft back to room. Pt stand to sit SBA. Call light in reach and chair alarm on.   Pain Assessment  Pain Level: 2  Pain Type: Acute pain;Surgical pain  Pain Location: Hip  Pain Orientation: Left  Pain Descriptors: Aching;Discomfort  Pain Frequency: Continuous  Pain Onset: On-going  Clinical Progression: Not changed  Functional Pain Assessment: Activities are not prevented  Pre Treatment Pain Screening  Intervention List: Patient able to continue with treatment;Patient declined any intervention  Vital Signs  Patient Currently in Pain: Yes   Orientation  Orientation  Overall Orientation Status: Within Functional Limits  Objective    ADL  Equipment Provided: Reacher  UE Dressing: Setup;Supervision;Verbal cueing  LE Dressing: Setup;Stand by assistance; Increased time to complete(SBA for brief/pants, Dependent for sneakers)        Balance  Standing Balance: Stand by assistance  Standing Balance  Time: ~12 min total  Activity: ambulation in hallway and LB clothing mgmt  Functional Mobility  Functional - Mobility Device: Rolling Walker  Activity: Other  Assist Level: Stand by assistance     Transfers  Sit to stand: Stand by assistance  Stand to sit: Stand by assistance                       Cognition  Overall Cognitive Status: [facility]  Cognition Comment: Pt inappropriate with conversation/jokes                                         Plan   Plan  Times per week: 7  Times per day: Daily  Current Treatment Recommendations: Endurance Training, Self-Care / ADL, Functional Mobility Training  G-Code     OutComes Score                                                  -PAC Score        -[facility] Inpatient Daily Activity Raw Score: 21 (05/12/21 1431)  AM-PAC Inpatient ADL T-Scale Score : 44.27 (05/12/21 1431)  ADL Inpatient CMS 0-100% Score: 32.79 (05/12/21 1431)  ADL Inpatient CMS G-Code Modifier : Dulce Maria Pulido (05/12/21 1431)    Goals  Short term goals  Time Frame for Short term goals: discharge  Short term goal 1: pt to do bed mobility with SBA - goal not addressed 5/12  Short term goal 2: pt to transfer sit to stand with SBA - goal met 5/12  Short term goal 3: pt to participate in commode transfer assessment - pt declinned 5/12  Patient Goals   Patient goals : not stated       Therapy Time   Individual Concurrent Group Co-treatment   Time In 0459         Time Out 1223         Minutes 41         Timed Code Treatment Minutes: [place], [address]

## 2021-05-12 NOTE — PLAN OF CARE
Problem: Falls - Risk of:  Goal: Will remain free from falls  Description: Will remain free from falls  5/12/2021 0742 by Steven Rodríguez RN  Outcome: Ongoing  Note: Pt is in bed with alarm on. Non-skid socks are on. Up x1 with walker tolerating ambulation well. Fall precautions in place. Call light and bedside table in reach. Problem: Infection - Surgical Site:  Goal: Signs of wound healing will improve  Description: Signs of wound healing will improve  Outcome: Ongoing  Note: Dressing is CDI. No new redness, swelling or drainage. Pt c/o some pain to sx site but states it is manageable. Will continue to monitor.

## 2021-05-12 NOTE — CARE COORDINATION
Case Management            Discharge Note                    Date / Time of Note: 5/12/2021 1:16 PM                  Discharge Note Completed by: Albino Junk Day    Patient Name: Cheyenne Velez   YOB: 1955  Diagnosis: Osteoarthritis of left hip, unspecified osteoarthritis type [M16.12]  Arthritis of left hip [M16.12]   Date / Time: 5/11/2021  6:13 AM    Current PCP: Brisa Tineo MD  Clinic patient: No    Hospitalization in the last 30 days: No    Advance Directives:  Code Status: Full Code  [place] DNR form completed and on chart: Not indicated. Financial:  Payor: MEDICARE / Plan: MEDICARE PART A AND B / Product Type: *No Product type* /      Pharmacy:    [facility] [address] [facility],  [address]  [address]  Phone: [phone] Fax: [phone] [address] [facility] [phone] - C [phone]  [facility]  [address]  [place] [address]  Phone: [phone] Fax: [address] [phone] - f [phone]  [address] [place] [address]  Phone: [phone] Fax: [phone]      Assistance purchasing medications?: Potential Assistance Purchasing Medications: No  Assistance provided by Case Management: None at this time    Does patient want to participate in local refill/ meds to beds program?: Not Assessed    Meds To Beds General Rules:  1. Can ONLY be done Monday- Friday between 8:30am-5pm  2. Prescription(s) must be in pharmacy by 3pm to be filled same day  3. Copy of patient's insurance/ prescription drug card and patient face sheet must be sent along with the prescription(s)  4. Cost of Rx cannot be added to hospital bill.  If financial assistance is needed, please contact unit  or ;  or  CANNOT provide pharmacy voucher for patients co-pays  5. Patients can then  the prescription on their way out of the hospital at discharge, or pharmacy can deliver to the bedside if staff is available. (payment due at time of pick-up or delivery - cash, check, or card accepted)     Able to afford home medications/ co-pay costs: Yes    ADLS:  Current PT AM-PAC Score: 18 /24  Current OT AM-PAC Score: 17 /24      Discharge Disposition: Home- No Services Needed    LOC at discharge: Not Applicable  MERE Completed: Not Indicated    Notification completed in HENS/PAS?:  Not Applicable    IMM Completed:   Not Indicated    Transportation:  Transportation Plan for discharge: family   Mode of Transport: Private [facility]:  [address] ordered at discharge: Not Indicated    Durable Medical Equipment:  DME Provider: N/A   Equipment obtained during hospitalization: Has Needed DME     Home Oxygen and Respiratory Equipment:  Oxygen needed at discharge?: Not Indicated    Referrals made at [facility] for outpatient continued care:  Not Applicable    Additional CM Notes:   SW met with the patient at bedside. Patient from home and independent at baseline. Patient plans to return home at discharge. He denies any needs as this is his second knee surgery. Patient reported he had transportation home. No other needs noted. The Plan for Transition of Care is related to the following treatment goals Osteoarthritis of left hip, unspecified osteoarthritis type [M16.12]  Arthritis of left hip [M16.12]      The Patient and/or patient representative was provided with a choice of provider and agrees with the discharge plan Yes    Freedom of choice list was provided with basic dialogue that supports the patient's individualized plan of care/goals and shares the quality data associated with the providers.  Yes    Care Transitions patient: Caitie Hogan, DREW  [facility]   Case Management Department  Ph: [phone]

## 2021-05-12 NOTE — PROGRESS NOTES
Rales/Wheezes/Rhonchi with good respiratory effort. Heart: Regular rate and rhythm with Normal S1/S2 without  murmurs, rubs or gallops, point of maximum impulse non-displaced  Abdomen: Soft, non-tender or non-distended without rigidity or guarding and positive bowel sounds all four quadrants. Extremities: No clubbing, cyanosis. There is mild edema of operated extremity  Skin: Skin color, texture, turgor normal.    Neurologic: Alert and oriented X 3,  grossly non-focal.  Mental status: Alert, oriented, thought content appropriate. Data    Recent Labs     05/11/21  0630 05/12/21  0556   WBC 8.9  --    HGB 12.9* 9.7*   HCT 39.6* 29.4*     --       Recent Labs     05/11/21  0630      K 4.8      CO2 23   BUN 41*   CREATININE 1.3     No results for input(s): AST, ALT, ALB, BILIDIR, BILITOT, ALKPHOS in the last 72 hours. No results for input(s): INR in the last 72 hours. No results for input(s): CKTOTAL, CKMB, CKMBINDEX, TROPONINI in the last 72 hours.     Consults:     IP CONSULT TO HOSPITALIST  IP CONSULT TO CASE MANAGEMENT  IP CONSULT TO DIETITIAN  IP CONSULT TO [address] Problems    Diagnosis Date Noted    Arthritis of left hip [M16.12] 05/11/2021         ASSESSMENT AND PLAN      HTN  Losartan to continue     Hypothyroidism  Recent TSH wnl, cont home dose     DMII  Not on any medications currently  Hgb A1c 6.9  Diabetic diet        DVT Prophylaxis: SCD  Diet: DIET GENERAL; Carb Control: 4 carb choices (60 gms)/meal  Code Status: Full Code    PT/OT Eval Status:pending    Dispo - discharge as per primary team, medically stable for discharge    Nicol Ge MD

## 2021-05-12 NOTE — PROGRESS NOTES
Pt is A/O x4. VSS. Dressing is CDI. Pt states pain is manageable, cold therapy in place. Up x1 with walker and gait belt, tolerating ambulation well. Fall precautions in place. Will continue to monitor.

## 2021-05-12 NOTE — PLAN OF CARE
Problem: Falls - Risk of:  Goal: Will remain free from falls  Outcome: Ongoing   Calls out appropriately. Bed locked in lowest position. Bed alarm on. Call light/belongings within reach. Problem: Pain:  Goal: Control of acute pain  Outcome: Ongoing  Pain treated with oral medication. Problem: Mobility - Impaired:  Goal: Achieve maximum mobility level  Outcome: Ongoing  Tolerating ambulation well with walker and GB.  Limited movement to LLE d/t surgery

## 2021-05-13 ENCOUNTER — CARE COORDINATION (OUTPATIENT)
Dept: CASE MANAGEMENT | Age: 66
End: 2021-05-13

## 2021-05-13 DIAGNOSIS — M16.11 ARTHRITIS OF RIGHT HIP: Primary | ICD-10-CM

## 2021-05-13 PROCEDURE — 1111F DSCHRG MED/CURRENT MED MERGE: CPT | Performed by: FAMILY MEDICINE

## 2021-05-13 NOTE — CARE COORDINATION
[facility] follow up appointment(s):   Future Appointments   Date Time Provider Bandar Gonzalezisti   8/5/2021  1:30 PM Bertram Lewis MD [facility]     Non-face-to-face services provided:  Obtained and reviewed discharge summary and/or continuity of care documents     Advance Care Planning:   Does patient have an Advance Directive:  not on file. Educated patient about risk for severe COVID-19 due to risk factors according to CDC guidelines. CTN reviewed discharge instructions, medical action plan and red flag symptoms patient who verbalized understanding. Discussed COVID vaccination status No. Education provided on COVID-19 vaccination as appropriate. Discussed exposure protocols and quarantine with CDC Guidelines. Patient was given an opportunity to verbalize any questions and concerns and agrees to contact PCP or health care provider for questions related to their healthcare.     Reviewed and educated patient on any new and changed medications related to discharge diagnosis

## 2021-05-28 NOTE — DISCHARGE SUMMARY
[address] [facility] SUMMARY    Pre Operative Diagnosis  Osteoarthritis of left hip, unspecified osteoarthritis type [M16.12]    Post Operative Diagnosis Osteoarthritis of left hip, unspecified osteoarthritis type [M16.12]    Discharge Diagnosis Osteoarthritis of left hip, unspecified osteoarthritis type [M16.12]      Procedure Preformed  Procedure(s):  DIRECT ANTERIOR APPROACH LEFT TOTAL HIP ARTHROPLASTY    Surgeon  Mark Shirley MD     Medical course: as per medical records       The patient was taken to the operating room  where the aforementioned procedure was preformed. The patient was taken to the post operative anesthesia recovery unit in stable condition. The patient was then transferred to the orthopaedic floor for post operative pain management and convalesce. ( x )The patient was placed on anticoagulation therapy for DVT prophylaxis       The patient was discharged in stable condition. Please see medical reconciliation for discharge medications. The discharge instructions were explained to the patient and the family. The patient will follow up in the office in 3 weeks for repeat examination and xray .

## 2021-06-02 LAB
C-REACTIVE PROTEIN WIDE RANGE: 26 MG/L
SEDIMENTATION RATE, ERYTHROCYTE: 21 MM/HR (ref 0–20)

## 2021-07-16 ENCOUNTER — TELEPHONE (OUTPATIENT)
Dept: FAMILY MEDICINE CLINIC | Age: 66
End: 2021-07-16

## 2021-07-16 NOTE — TELEPHONE ENCOUNTER
----- Message from Caryn Walker sent at 7/16/2021  7:19 AM EDT -----  Subject: Message to Provider    QUESTIONS  Information for Provider? Pt return office call.  ---------------------------------------------------------------------------  --------------  CALL BACK INFO  What is the best way for the office to contact you? OK to leave message on   voicemail  Preferred Call Back Phone Number? [phone]  ---------------------------------------------------------------------------  --------------  SCRIPT ANSWERS  Relationship to Patient?  Self

## 2021-07-20 RX ORDER — LOSARTAN POTASSIUM 50 MG/1
TABLET ORAL
Qty: 90 TABLET | Refills: 2 | Status: SHIPPED | OUTPATIENT
Start: 2021-07-20 | End: 2021-09-11 | Stop reason: SDUPTHER

## 2021-07-20 RX ORDER — SIMVASTATIN 40 MG
TABLET ORAL
Qty: 90 TABLET | Refills: 2 | Status: SHIPPED | OUTPATIENT
Start: 2021-07-20 | End: 2021-09-11 | Stop reason: SDUPTHER

## 2021-07-27 ENCOUNTER — TELEPHONE (OUTPATIENT)
Dept: FAMILY MEDICINE CLINIC | Age: 66
End: 2021-07-27

## 2021-07-27 NOTE — TELEPHONE ENCOUNTER
----- Message from Shawna Gone sent at 7/27/2021  8:09 AM EDT -----  Subject: Message to Provider    QUESTIONS  Information for Provider? The patient is calling this morning in regards   to upcoming Pre op appt on Thursday 7/29. Patient states that this pre op   physical is for his lower back pain and pinched nerve on the left side. Patient will need EKG and labs done at this appointment.   ---------------------------------------------------------------------------  --------------  [address]  What is the best way for the office to contact you? OK to leave message on   voicemail  Preferred Call Back Phone Number? [phone]  ---------------------------------------------------------------------------  --------------  SCRIPT ANSWERS  Relationship to Patient?  Self

## 2021-07-27 NOTE — TELEPHONE ENCOUNTER
Spoke with Jessi Sampson - he doesn't have a Surgery Date yet but he still wants the PreOp. Changed time for his appt on Thursday to 30 minutes. Myesha Garner that this PreOp is only good for 30 days and he verbalized understanding.

## 2021-07-29 ENCOUNTER — OFFICE VISIT (OUTPATIENT)
Dept: FAMILY MEDICINE CLINIC | Age: 66
End: 2021-07-29
Payer: MEDICARE

## 2021-07-29 VITALS
WEIGHT: 191 LBS | SYSTOLIC BLOOD PRESSURE: 112 MMHG | BODY MASS INDEX: 33.84 KG/M2 | RESPIRATION RATE: 16 BRPM | HEART RATE: 52 BPM | DIASTOLIC BLOOD PRESSURE: 62 MMHG | OXYGEN SATURATION: 97 % | HEIGHT: 63 IN

## 2021-07-29 DIAGNOSIS — M54.50 CHRONIC LEFT-SIDED LOW BACK PAIN WITHOUT SCIATICA: ICD-10-CM

## 2021-07-29 DIAGNOSIS — G89.29 CHRONIC LEFT-SIDED LOW BACK PAIN WITHOUT SCIATICA: ICD-10-CM

## 2021-07-29 DIAGNOSIS — E11.9 TYPE 2 DIABETES MELLITUS WITHOUT COMPLICATION, WITHOUT LONG-TERM CURRENT USE OF INSULIN (HCC): ICD-10-CM

## 2021-07-29 DIAGNOSIS — I10 ESSENTIAL HYPERTENSION: ICD-10-CM

## 2021-07-29 DIAGNOSIS — Z01.818 PREOP EXAMINATION: ICD-10-CM

## 2021-07-29 DIAGNOSIS — Z01.818 PREOP EXAMINATION: Primary | ICD-10-CM

## 2021-07-29 LAB
A/G RATIO: 1.4 (ref 1.1–2.2)
ALBUMIN SERPL-MCNC: 4.1 G/DL (ref 3.4–5)
ALP BLD-CCNC: 81 U/L (ref 40–129)
ALT SERPL-CCNC: 14 U/L (ref 10–40)
ANION GAP SERPL CALCULATED.3IONS-SCNC: 9 MMOL/L (ref 3–16)
AST SERPL-CCNC: 19 U/L (ref 15–37)
BASOPHILS ABSOLUTE: 0 K/UL (ref 0–0.2)
BASOPHILS RELATIVE PERCENT: 0.5 %
BILIRUB SERPL-MCNC: <0.2 MG/DL (ref 0–1)
BUN BLDV-MCNC: 34 MG/DL (ref 7–20)
CALCIUM SERPL-MCNC: 9.5 MG/DL (ref 8.3–10.6)
CHLORIDE BLD-SCNC: 106 MMOL/L (ref 99–110)
CO2: 23 MMOL/L (ref 21–32)
CREAT SERPL-MCNC: 1.3 MG/DL (ref 0.8–1.3)
EOSINOPHILS ABSOLUTE: 0.1 K/UL (ref 0–0.6)
EOSINOPHILS RELATIVE PERCENT: 1.5 %
GFR AFRICAN AMERICAN: >60
GFR NON-AFRICAN AMERICAN: 55
GLOBULIN: 2.9 G/DL
GLUCOSE BLD-MCNC: 102 MG/DL (ref 70–99)
HCT VFR BLD CALC: 30.1 % (ref 40.5–52.5)
HEMOGLOBIN: 9.3 G/DL (ref 13.5–17.5)
LYMPHOCYTES ABSOLUTE: 1.4 K/UL (ref 1–5.1)
LYMPHOCYTES RELATIVE PERCENT: 26.5 %
MCH RBC QN AUTO: 22.2 PG (ref 26–34)
MCHC RBC AUTO-ENTMCNC: 30.9 G/DL (ref 31–36)
MCV RBC AUTO: 71.8 FL (ref 80–100)
MONOCYTES ABSOLUTE: 0.5 K/UL (ref 0–1.3)
MONOCYTES RELATIVE PERCENT: 10.2 %
NEUTROPHILS ABSOLUTE: 3.2 K/UL (ref 1.7–7.7)
NEUTROPHILS RELATIVE PERCENT: 61.3 %
PDW BLD-RTO: 17.7 % (ref 12.4–15.4)
PLATELET # BLD: 218 K/UL (ref 135–450)
PMV BLD AUTO: 9.3 FL (ref 5–10.5)
POTASSIUM SERPL-SCNC: 5.4 MMOL/L (ref 3.5–5.1)
RBC # BLD: 4.19 M/UL (ref 4.2–5.9)
SODIUM BLD-SCNC: 138 MMOL/L (ref 136–145)
TOTAL PROTEIN: 7 G/DL (ref 6.4–8.2)
WBC # BLD: 5.3 K/UL (ref 4–11)

## 2021-07-29 PROCEDURE — G8427 DOCREV CUR MEDS BY ELIG CLIN: HCPCS | Performed by: FAMILY MEDICINE

## 2021-07-29 PROCEDURE — 3017F COLORECTAL CA SCREEN DOC REV: CPT | Performed by: FAMILY MEDICINE

## 2021-07-29 PROCEDURE — 1123F ACP DISCUSS/DSCN MKR DOCD: CPT | Performed by: FAMILY MEDICINE

## 2021-07-29 PROCEDURE — 2022F DILAT RTA XM EVC RTNOPTHY: CPT | Performed by: FAMILY MEDICINE

## 2021-07-29 PROCEDURE — G8417 CALC BMI ABV UP PARAM F/U: HCPCS | Performed by: FAMILY MEDICINE

## 2021-07-29 PROCEDURE — 1036F TOBACCO NON-USER: CPT | Performed by: FAMILY MEDICINE

## 2021-07-29 PROCEDURE — 4040F PNEUMOC VAC/ADMIN/RCVD: CPT | Performed by: FAMILY MEDICINE

## 2021-07-29 PROCEDURE — 99214 OFFICE O/P EST MOD 30 MIN: CPT | Performed by: FAMILY MEDICINE

## 2021-07-29 PROCEDURE — 3044F HG A1C LEVEL LT 7.0%: CPT | Performed by: FAMILY MEDICINE

## 2021-07-29 SDOH — ECONOMIC STABILITY: FOOD INSECURITY: WITHIN THE PAST 12 MONTHS, THE FOOD YOU BOUGHT JUST DIDN'T LAST AND YOU DIDN'T HAVE MONEY TO GET MORE.: NEVER TRUE

## 2021-07-29 SDOH — ECONOMIC STABILITY: FOOD INSECURITY: WITHIN THE PAST 12 MONTHS, YOU WORRIED THAT YOUR FOOD WOULD RUN OUT BEFORE YOU GOT MONEY TO BUY MORE.: NEVER TRUE

## 2021-07-29 ASSESSMENT — ENCOUNTER SYMPTOMS
CONSTIPATION: 0
SHORTNESS OF BREATH: 0
CHEST TIGHTNESS: 0
COUGH: 0
DIARRHEA: 0
ABDOMINAL DISTENTION: 0
BLOOD IN STOOL: 0
ABDOMINAL PAIN: 0
SORE THROAT: 0
VOICE CHANGE: 0
NAUSEA: 0
VOMITING: 0
TROUBLE SWALLOWING: 0

## 2021-07-29 ASSESSMENT — SOCIAL DETERMINANTS OF HEALTH (SDOH): HOW HARD IS IT FOR YOU TO PAY FOR THE VERY BASICS LIKE FOOD, HOUSING, MEDICAL CARE, AND HEATING?: NOT HARD AT ALL

## 2021-07-29 NOTE — PROGRESS NOTES
Subjective:      Patient ID: Jewell Snyder is a 77 y.o. male. Chief Complaint   Patient presents with    Pre-op Exam     Lower back surgery at [facility] no date scheduled         Patient presents with:  Pre-op Exam: Lower back surgery at [facility] no date scheduled     Here for the above  He does not have scheduled date  Tentative lower back surgery for chronic pain  Legs ok    Allergies:   -- Codeine -- Nausea Only   -- Nsaids     --  Kidney issues     height is 5' 3\" (1.6 m) and weight is 191 lb (86.6 kg). His blood pressure is 112/62 and his pulse is 52. His respiration is 16 and oxygen saturation is 97%. No tobacco use. No ETOH use.      Immunization History  Administered            Date(s) Administered    COVID-19, Moderna, PF, 100mcg/0.5mL                          03/01/2021 03/29/2021      Influenza, Quadv, adjuvanted, 65 yrs +, IM, PF (Fluad)                          11/04/2020      Pneumococcal Polysaccharide (Tmahyhisc79)                          07/27/2020      Td, unspecified formulation                          06/26/2006      Tdap (Boostrix, Adacel)                          07/27/2017      Zoster Live (Zostavax)                          05/03/2011      Current Outpatient Medications:     simvastatin (ZOCOR) 40 MG tablet, TAKE ONE TABLET BY MOUTH ONCE NIGHTLY,    losartan (COZAAR) 50 MG tablet, TAKE ONE TABLET BY MOUTH DAILY    levothyroxine (SYNTHROID) 175 MCG tablet, Take 1 tablet by mouth Daily    vitamin D3 (CHOLECALCIFEROL) 10 MCG (400 UNIT) TABS tablet, Take 1 tablet by mouth daily    cyclobenzaprine (FLEXERIL) 10 MG tablet, Take 1 tablet by mouth 3 times daily as needed for Muscle spasms,     Probiotic Product (ALIGN PO), Take by mouth    fluvoxaMINE (LUVOX) 100 MG tablet, nightly     pantoprazole (PROTONIX) 20 MG tablet, Take 40 mg by mouth daily     ONE TOUCH ULTRA TEST strip, TEST ONCE DAILY    ONETOUCH DELICA LANCETS MISC, USE AS DIRECTED   busPIRone (BUSPAR) 10 MG tablet, Take 1 tablet by mouth 2 times daily. (Patient taking differently: 20 mg nightly 3 tablets at night)    desvenlafaxine (PRISTIQ) 50 MG TB24, Take 50 mg by mouth daily. Past Surgical History:  4.27.05: COLONOSCOPY      Comment:  wang, Dr. Argelia Lima  5/16/14: COLONOSCOPY      Comment:  jf piña  No date: EXTERNAL AUDITORY CANAL RECONSTRUCTION      Comment:  left 1980 and 1995 4/27/2010: LYMPH NODE BIOPSY      Comment:  removed groin, benign, dr Naik Smoke  No date: MOUTH SURGERY      Comment:  gum surgery   No date: TONSILLECTOMY  9/15/2020: TOTAL HIP ARTHROPLASTY; Right      Comment:  RIGHT DIRECT ANTERIOR APPROACH TOTAL HIP ARTHROPLASTY                performed by Jose R Tejada MD at [address]  5/11/2021: TOTAL HIP ARTHROPLASTY; Left      Comment:  DIRECT ANTERIOR APPROACH LEFT TOTAL HIP ARTHROPLASTY                performed by Jose R Tejada MD at [address]  5/16/14: UPPER GASTROINTESTINAL ENDOSCOPY      Comment:  jf moore, check in one year  6/1/15: UPPER GASTROINTESTINAL ENDOSCOPY      Comment:  Mathew Dodd, check one year  06/13/2018: UPPER GASTROINTESTINAL ENDOSCOPY      Comment:  oscar Dodd, check in 3 years    No problems with anesthesia         Review of Systems   Constitutional: Negative for appetite change, chills, fever and unexpected weight change. HENT: Negative for sore throat, trouble swallowing and voice change. No loose teeth. Crown front teeth    Respiratory: Negative for cough, chest tightness and shortness of breath. Cardiovascular: Negative for chest pain, palpitations and leg swelling. Carries up items up and down stairs no cp no sob  Vacuums no cp no sob     Gastrointestinal: Negative for abdominal distention, abdominal pain, blood in stool, constipation, diarrhea, nausea and vomiting. No dysphagia occ gerd    Genitourinary: Negative for difficulty urinating, dysuria and hematuria.    Skin: Negative for rash. Neurological: Negative for dizziness, seizures, syncope and headaches. Occ ha not new no severe   Hematological: Negative for adenopathy. Does not bruise/bleed easily. No hx of DVT        Objective:   Physical Exam  Constitutional:       General: He is not in acute distress. Appearance: Normal appearance. He is well-developed. He is not ill-appearing or diaphoretic. HENT:      Head: Normocephalic and atraumatic. Right Ear: Tympanic membrane and ear canal normal.      Left Ear: Ear canal normal. Tympanic membrane is scarred. Ears:      Comments: Has seen ent on several occassions for the ears      Nose: Nose normal.      Mouth/Throat:      Lips: Pink. Mouth: Mucous membranes are moist. No oral lesions. Pharynx: Oropharynx is clear. Uvula midline. Eyes:      General: No scleral icterus. Neck:      Thyroid: No thyroid mass or thyromegaly. Cardiovascular:      Rate and Rhythm: Normal rate and regular rhythm. Heart sounds: Normal heart sounds. No murmur heard. No friction rub. No gallop. Comments:     Pulmonary:      Effort: Pulmonary effort is normal. No tachypnea, accessory muscle usage or respiratory distress. Breath sounds: Normal breath sounds. No decreased breath sounds, wheezing, rhonchi or rales. Abdominal:      General: Bowel sounds are normal. There is no distension or abdominal bruit. Palpations: Abdomen is soft. There is no hepatomegaly, splenomegaly, mass or pulsatile mass. Tenderness: There is no abdominal tenderness. There is no guarding. Musculoskeletal:      Cervical back: Neck supple. Lymphadenopathy:      Head:      Right side of head: No submental or submandibular adenopathy. Left side of head: No submental or submandibular adenopathy. Cervical: No cervical adenopathy. Upper Body:      Right upper body: No supraclavicular adenopathy. Left upper body: No supraclavicular adenopathy.    Skin:

## 2021-07-29 NOTE — PATIENT INSTRUCTIONS
do contact your cardiologist for surgical clearance   On the day of the  surgery take the pantoprazole and thyroid medicine with just enough water to get the pills down as soon as you get out of bed that morning

## 2021-07-30 LAB
ESTIMATED AVERAGE GLUCOSE: 137 MG/DL
HBA1C MFR BLD: 6.4 %

## 2021-08-02 ENCOUNTER — TELEPHONE (OUTPATIENT)
Dept: FAMILY MEDICINE CLINIC | Age: 66
End: 2021-08-02

## 2021-08-02 NOTE — TELEPHONE ENCOUNTER
----- Message from Yaquelin Levi sent at 8/2/2021  8:10 AM EDT -----  Subject: Results Request    QUESTIONS  Which lab or imaging result is the patient calling about? Lab results   Which provider ordered the test? Angelica Metcalf   At what location was the test performed? Date the test was performed? 2021-07-29  Additional Information for Provider? Pt is concerned about his lab   results. He wants to make sure everything is okay.   ---------------------------------------------------------------------------  --------------  CALL BACK INFO  What is the best way for the office to contact you? OK to leave message on   voicemail  Preferred Call Back Phone Number?  [phone]

## 2021-08-03 ENCOUNTER — HOSPITAL ENCOUNTER (OUTPATIENT)
Age: 66
Discharge: HOME | End: 2021-08-03
Payer: MEDICARE

## 2021-08-03 ENCOUNTER — OFFICE VISIT (OUTPATIENT)
Dept: FAMILY MEDICINE CLINIC | Age: 66
End: 2021-08-03
Payer: MEDICARE

## 2021-08-03 VITALS
TEMPERATURE: 97.2 F | DIASTOLIC BLOOD PRESSURE: 74 MMHG | BODY MASS INDEX: 33.31 KG/M2 | HEIGHT: 63 IN | WEIGHT: 188 LBS | SYSTOLIC BLOOD PRESSURE: 122 MMHG

## 2021-08-03 DIAGNOSIS — E11.9 TYPE 2 DIABETES MELLITUS WITHOUT COMPLICATION, WITHOUT LONG-TERM CURRENT USE OF INSULIN (HCC): ICD-10-CM

## 2021-08-03 DIAGNOSIS — D50.9 MICROCYTIC ANEMIA: ICD-10-CM

## 2021-08-03 DIAGNOSIS — D50.9 MICROCYTIC ANEMIA: Primary | ICD-10-CM

## 2021-08-03 LAB
FOLATE: 9.42 NG/ML (ref 4.78–24.2)
HCT VFR BLD CALC: 30.3 % (ref 40.5–52.5)
HEMOGLOBIN: 9.3 G/DL (ref 13.5–17.5)
IRON SATURATION: 8 % (ref 20–50)
IRON: 30 UG/DL (ref 59–158)
TOTAL IRON BINDING CAPACITY: 389 UG/DL (ref 260–445)
VITAMIN B-12: 434 PG/ML (ref 211–911)

## 2021-08-03 PROCEDURE — 83540 ASSAY OF IRON: CPT

## 2021-08-03 PROCEDURE — 36415 COLL VENOUS BLD VENIPUNCTURE: CPT

## 2021-08-03 PROCEDURE — 3017F COLORECTAL CA SCREEN DOC REV: CPT | Performed by: FAMILY MEDICINE

## 2021-08-03 PROCEDURE — 1036F TOBACCO NON-USER: CPT | Performed by: FAMILY MEDICINE

## 2021-08-03 PROCEDURE — 83550 IRON BINDING TEST: CPT

## 2021-08-03 PROCEDURE — 3044F HG A1C LEVEL LT 7.0%: CPT | Performed by: FAMILY MEDICINE

## 2021-08-03 PROCEDURE — 4040F PNEUMOC VAC/ADMIN/RCVD: CPT | Performed by: FAMILY MEDICINE

## 2021-08-03 PROCEDURE — 82746 ASSAY OF FOLIC ACID SERUM: CPT

## 2021-08-03 PROCEDURE — 85018 HEMOGLOBIN: CPT

## 2021-08-03 PROCEDURE — G8427 DOCREV CUR MEDS BY ELIG CLIN: HCPCS | Performed by: FAMILY MEDICINE

## 2021-08-03 PROCEDURE — 82607 VITAMIN B-12: CPT

## 2021-08-03 PROCEDURE — 1123F ACP DISCUSS/DSCN MKR DOCD: CPT | Performed by: FAMILY MEDICINE

## 2021-08-03 PROCEDURE — 99213 OFFICE O/P EST LOW 20 MIN: CPT | Performed by: FAMILY MEDICINE

## 2021-08-03 PROCEDURE — G8417 CALC BMI ABV UP PARAM F/U: HCPCS | Performed by: FAMILY MEDICINE

## 2021-08-03 PROCEDURE — 85014 HEMATOCRIT: CPT

## 2021-08-03 PROCEDURE — 2022F DILAT RTA XM EVC RTNOPTHY: CPT | Performed by: FAMILY MEDICINE

## 2021-08-03 ASSESSMENT — PATIENT HEALTH QUESTIONNAIRE - PHQ9
2. FEELING DOWN, DEPRESSED OR HOPELESS: 0
SUM OF ALL RESPONSES TO PHQ9 QUESTIONS 1 & 2: 0
SUM OF ALL RESPONSES TO PHQ QUESTIONS 1-9: 0
1. LITTLE INTEREST OR PLEASURE IN DOING THINGS: 0

## 2021-08-03 NOTE — PATIENT INSTRUCTIONS
Do take the iron tablet twice daily  See dr Ruben Bridges for the anemia   Do the blood work now  Repeat part of the blood work in 2 weeks

## 2021-08-03 NOTE — PROGRESS NOTES
Subjective:      Patient ID: Odalis Webster is a 77 y.o. male. Chief Complaint   Patient presents with    Results     discuss lab results        Patient presents with:  Results: discuss lab results    Here for the above  He is not noting any blood in stool   No melena noted  No dysphagia no gerd  No abd pain    YOB: 1955    Date of Visit:  8/3/2021     -- Codeine -- Nausea Only   -- Nsaids     --  Kidney issues    Current Outpatient Medications:  simvastatin (ZOCOR) 40 MG tablet, TAKE ONE TABLET BY MOUTH ONCE NIGHTLY, Disp: 90 tablet, Rfl: 2  losartan (COZAAR) 50 MG tablet, TAKE ONE TABLET BY MOUTH DAILY, Disp: 90 tablet, Rfl: 2  levothyroxine (SYNTHROID) 175 MCG tablet, Take 1 tablet by mouth Daily, Disp: 90 tablet, Rfl: 1  vitamin D3 (CHOLECALCIFEROL) 10 MCG (400 UNIT) TABS tablet, Take 1 tablet by mouth daily, Disp: , Rfl:   cyclobenzaprine (FLEXERIL) 10 MG tablet, Take 1 tablet by mouth 3 times daily as needed for Muscle spasms, Disp: 21 tablet, Rfl: 0  Probiotic Product (ALIGN PO), Take by mouth, Disp: , Rfl:   fluvoxaMINE (LUVOX) 100 MG tablet, nightly , Disp: , Rfl:   pantoprazole (PROTONIX) 20 MG tablet, Take 40 mg by mouth daily , Disp: , Rfl:   ONE TOUCH ULTRA TEST strip, TEST ONCE DAILY, Disp: 30 strip, Rfl: 6  ONETOUCH DELICA LANCETS MISC, USE AS DIRECTED, Disp: 4 each, Rfl: 6  busPIRone (BUSPAR) 10 MG tablet, Take 1 tablet by mouth 2 times daily. (Patient taking differently: 20 mg nightly 3 tablets at night), Disp: 60 tablet, Rfl: 4  desvenlafaxine (PRISTIQ) 50 MG TB24, Take 50 mg by mouth daily.   , Disp: , Rfl:     No current facility-administered medications for this visit.      ---------------------------               08/03/21                      0819         ---------------------------   BP:          122/74         Site:    Left Upper Arm     Position:     Sitting        Cuff Size:   Large Adult      Temp:   97.2 °F (36.2 °C)   TempSrc:    Temporal

## 2021-08-05 ENCOUNTER — OFFICE VISIT (OUTPATIENT)
Dept: FAMILY MEDICINE CLINIC | Age: 66
End: 2021-08-05
Payer: MEDICARE

## 2021-08-05 VITALS
TEMPERATURE: 97.2 F | SYSTOLIC BLOOD PRESSURE: 122 MMHG | DIASTOLIC BLOOD PRESSURE: 74 MMHG | BODY MASS INDEX: 33.31 KG/M2 | WEIGHT: 188 LBS | HEIGHT: 63 IN

## 2021-08-05 DIAGNOSIS — Z00.00 MEDICARE ANNUAL WELLNESS VISIT, INITIAL: Primary | ICD-10-CM

## 2021-08-05 DIAGNOSIS — Z00.00 ROUTINE GENERAL MEDICAL EXAMINATION AT A HEALTH CARE FACILITY: ICD-10-CM

## 2021-08-05 PROCEDURE — 4040F PNEUMOC VAC/ADMIN/RCVD: CPT | Performed by: NURSE PRACTITIONER

## 2021-08-05 PROCEDURE — G0438 PPPS, INITIAL VISIT: HCPCS | Performed by: NURSE PRACTITIONER

## 2021-08-05 PROCEDURE — 3017F COLORECTAL CA SCREEN DOC REV: CPT | Performed by: NURSE PRACTITIONER

## 2021-08-05 PROCEDURE — 1123F ACP DISCUSS/DSCN MKR DOCD: CPT | Performed by: NURSE PRACTITIONER

## 2021-08-05 ASSESSMENT — PATIENT HEALTH QUESTIONNAIRE - PHQ9
SUM OF ALL RESPONSES TO PHQ QUESTIONS 1-9: 0
2. FEELING DOWN, DEPRESSED OR HOPELESS: 0
SUM OF ALL RESPONSES TO PHQ QUESTIONS 1-9: 0
SUM OF ALL RESPONSES TO PHQ9 QUESTIONS 1 & 2: 0
1. LITTLE INTEREST OR PLEASURE IN DOING THINGS: 0
SUM OF ALL RESPONSES TO PHQ QUESTIONS 1-9: 0

## 2021-08-05 ASSESSMENT — LIFESTYLE VARIABLES
HOW OFTEN DO YOU HAVE A DRINK CONTAINING ALCOHOL: NEVER
AUDIT TOTAL SCORE: INCOMPLETE
AUDIT-C TOTAL SCORE: INCOMPLETE
HOW OFTEN DO YOU HAVE A DRINK CONTAINING ALCOHOL: 0

## 2021-08-05 NOTE — PATIENT INSTRUCTIONS
Patient Education        Well Visit, Over 72: Care Instructions  Overview     Well visits can help you stay healthy. Your doctor has checked your overall health and may have suggested ways to take good care of yourself. Your doctor also may have recommended tests. At home, you can help prevent illness with healthy eating, regular exercise, and other steps. Follow-up care is a key part of your treatment and safety. Be sure to make and go to all appointments, and call your doctor if you are having problems. It's also a good idea to know your test results and keep a list of the medicines you take. How can you care for yourself at home? · Get screening tests that you and your doctor decide on. Screening helps find diseases before any symptoms appear. · Eat healthy foods. Choose fruits, vegetables, whole grains, protein, and low-fat dairy foods. Limit fat, especially saturated fat. Reduce salt in your diet. · Limit alcohol. If you are a man, have no more than 2 drinks a day or 14 drinks a week. If you are a woman, have no more than 1 drink a day or 7 drinks a week. Since alcohol affects older adults differently, you may want to limit alcohol even more. Or you may not want to drink at all. · Get at least 30 minutes of exercise on most days of the week. Walking is a good choice. You also may want to do other activities, such as running, swimming, cycling, or playing tennis or team sports. · Reach and stay at a healthy weight. This will lower your risk for many problems, such as obesity, diabetes, heart disease, and high blood pressure. · Do not smoke. Smoking can make health problems worse. If you need help quitting, talk to your doctor about stop-smoking programs and medicines. These can increase your chances of quitting for good. · Care for your mental health. It is easy to get weighed down by worry and stress.  Learn strategies to manage stress, like deep breathing and mindfulness, and stay connected with your family and community. If you find you often feel sad or hopeless, talk with your doctor. Treatment can help. · Talk to your doctor about whether you have any risk factors for sexually transmitted infections (STIs). You can help prevent STIs if you wait to have sex with a new partner (or partners) until you've each been tested for STIs. It also helps if you use condoms (male or female condoms) and if you limit your sex partners to one person who only has sex with you. Vaccines are available for some STIs. · If you think you may have a problem with alcohol or drug use, talk to your doctor. This includes prescription medicines (such as amphetamines and opioids) and illegal drugs (such as cocaine and methamphetamine). Your doctor can help you figure out what type of treatment is best for you. · Protect your skin from too much sun. When you're outdoors from 10 a.m. to 4 p.m., stay in the shade or cover up with clothing and a hat with a wide brim. Wear sunglasses that block UV rays. Even when it's cloudy, put broad-spectrum sunscreen (SPF 30 or higher) on any exposed skin. · See a dentist one or two times a year for checkups and to have your teeth cleaned. · Wear a seat belt in the car. When should you call for help? Watch closely for changes in your health, and be sure to contact your doctor if you have any problems or symptoms that concern you. Where can you learn more? Go to https://ehsan.[employer]ners. org and sign in to your [employer] account. Enter D959 in the [facility] box to learn more about \"Well Visit, Over 65: Care Instructions. \"     If you do not have an account, please click on the \"Sign Up Now\" link. Current as of: May 27, 2020               Content Version: 12.9  © 4297-3474 Healthwise, Incorporated. Care instructions adapted under license by [facility] ([facility]).  If you have questions about a medical condition or this instruction, always ask your healthcare professional. [facility], [facility] disclaims any warranty or liability for your use of this information. Patient Education        Iron Deficiency Anemia: Care Instructions  Your Care Instructions     Anemia means that you don't have enough red blood cells. Red blood cells carry oxygen around your body. When you have anemia, it can make you pale, weak, and tired. Many things can cause anemia. The most common cause is loss of blood. This can happen if you have heavy menstrual periods. It can also happen if you have bleeding in your stomach or bowel. You can also get anemia if you don't have enough iron in your diet or if it's hard for your body to absorb iron. In some cases, pregnancy causes anemia. That's because a pregnant woman needs more iron. Your doctor may do more tests to find the cause of your anemia. If a disease or other health problem is causing it, your doctor will treat that problem. It's important to follow up with your doctor to make sure that your iron level returns to normal.  Follow-up care is a key part of your treatment and safety. Be sure to make and go to all appointments, and call your doctor if you are having problems. It's also a good idea to know your test results and keep a list of the medicines you take. How can you care for yourself at home? · If your doctor recommended iron pills, take them as directed. ? Try to take the pills on an empty stomach. You can do this about 1 hour before or 2 hours after meals. But you may need to take iron with food to avoid an upset stomach. ? Do not take antacids or drink milk or anything with caffeine within 2 hours of when you take your iron. They can keep your body from absorbing the iron well. ? Vitamin C helps your body absorb iron. You may want to take iron pills with a glass of orange juice or some other food high in vitamin C.  ? Iron pills may cause stomach problems. These include heartburn, nausea, diarrhea, constipation, and cramps.  It can help to drink plenty of fluids and include fruits, vegetables, and fiber in your diet. ? It's normal for iron pills to make your stool a greenish or grayish black. But internal bleeding can also cause dark stool. So it's important to tell your doctor about any color changes. ? Call your doctor if you think you are having a problem with your iron pills. Even after you start to feel better, it will take several months for your body to build up its supply of iron. ? If you miss a pill, don't take a double dose. ? Keep iron pills out of the reach of small children. Too much iron can be very dangerous. · Eat foods with a lot of iron. These include red meat, shellfish, poultry, and eggs. They also include beans, raisins, whole-grain bread, and leafy green vegetables. · Steam your vegetables. This is the best way to prepare them if you want to get as much iron as possible. · Be safe with medicines. Do not take nonsteroidal anti-inflammatory pain relievers unless your doctor tells you to. These include aspirin, naproxen (Aleve), and ibuprofen (Advil, Motrin). · Liquid iron can stain your teeth. But you can mix it with water or juice and drink it with a straw. Then it won't get on your teeth. When should you call for help? Call 911 anytime you think you may need emergency care. For example, call if:    · You passed out (lost consciousness). Call your doctor now or seek immediate medical care if:    · You are short of breath.     · You are dizzy or light-headed, or you feel like you may faint.     · You have new or worse bleeding. Watch closely for changes in your health, and be sure to contact your doctor if:    · You feel weaker or more tired than usual.     · You do not get better as expected. Where can you learn more? Go to https://ehsan."[employer]". org and sign in to your [employer] account.  Enter X958 in the [employer] box to learn more about \"Iron Deficiency Anemia: Care Instructions. \"     If you do not have an account, please click on the \"Sign Up Now\" link. Current as of: September 23, 2020               Content Version: 12.9  © 2006-2021 Healthwise, Incorporated. Care instructions adapted under license by [facility] ([facility]). If you have questions about a medical condition or this instruction, always ask your healthcare professional. [facility] [address] any warranty or liability for your use of this information. Eating Healthy Foods: Care Instructions  Your Care Instructions     Eating healthy foods can help lower your risk for disease. Healthy food gives you energy and keeps your heart strong, your brain active, your muscles working, and your bones strong. A healthy diet includes a variety of foods from the basic food groups: grains, vegetables, fruits, milk and milk products, and meat and beans. Some people may eat more of their favorite foods from only one food group and, as a result, miss getting the nutrients they need. So, it is important to pay attention not only to what you eat but also to what you are missing from your diet. You can eat a healthy, balanced diet by making a few small changes. Follow-up care is a key part of your treatment and safety. Be sure to make and go to all appointments, and call your doctor if you are having problems. It's also a good idea to know your test results and keep a list of the medicines you take. How can you care for yourself at home? Look at what you eat  · Keep a food diary for a week or two and record everything you eat or drink. Track the number of servings you eat from each food group. · For a balanced diet every day, eat a variety of:  ? 6 or more ounce-equivalents of grains, such as cereals, breads, crackers, rice, or pasta, every day.  An ounce-equivalent is 1 slice of bread, 1 cup of ready-to-eat cereal, or ½ cup of cooked rice, cooked pasta, or cooked cereal.  ? 2½ cups of vegetables, especially:  § Dark-green vegetables such as broccoli and spinach. § Orange vegetables such as carrots and sweet potatoes. § Dry beans (such as ty and kidney beans) and peas (such as lentils). ? 2 cups of fresh, frozen, or canned fruit. A small apple or 1 banana or orange equals 1 cup. ? 3 cups of nonfat or low-fat milk, yogurt, or other milk products. ? 5½ ounces of meat and beans, such as chicken, fish, lean meat, beans, nuts, and seeds. One egg, 1 tablespoon of peanut butter, ½ ounce nuts or seeds, or ¼ cup of cooked beans equals 1 ounce of meat. · Learn how to read food labels for serving sizes and ingredients. Fast-food and convenience-food meals often contain few or no fruits or vegetables. Make sure you eat some fruits and vegetables to make the meal more nutritious. · Look at your food diary. For each food group, add up what you have eaten and then divide the total by the number of days. This will give you an idea of how much you are eating from each food group. See if you can find some ways to change your diet to make it more healthy. Start small  · Do not try to make dramatic changes to your diet all at once. You might feel that you are missing out on your favorite foods and then be more likely to fail. · Start slowly, and gradually change your habits. Try some of the following:  ? Use whole wheat bread instead of white bread. ? Use nonfat or low-fat milk instead of whole milk. ? Eat brown rice instead of white rice, and eat whole wheat pasta instead of white-flour pasta. ? Try low-fat cheeses and low-fat yogurt. ? Add more fruits and vegetables to meals and have them for snacks. ? Add lettuce, tomato, cucumber, and onion to sandwiches. ? Add fruit to yogurt and cereal.  Enjoy food  · You can still eat your favorite foods. You just may need to eat less of them. If your favorite foods are high in fat, salt, and sugar, limit how often you eat them, but do not cut them out entirely.   · Eat a wide variety of foods. Make healthy choices when eating out  · The type of restaurant you choose can help you make healthy choices. Even fast-food chains are now offering more low-fat or healthier choices on the menu. · Choose smaller portions, or take half of your meal home. · When eating out, try:  ? A veggie pizza with a whole wheat crust or grilled chicken (instead of sausage or pepperoni). ? Pasta with roasted vegetables, grilled chicken, or marinara sauce instead of cream sauce. ? A vegetable wrap or grilled chicken wrap. ? Broiled or poached food instead of fried or breaded items. Make healthy choices easy  · Buy packaged, prewashed, ready-to-eat fresh vegetables and fruits, such as baby carrots, salad mixes, and chopped or shredded broccoli and cauliflower. · Buy packaged, presliced fruits, such as melon or pineapple. · Choose 100% fruit or vegetable juice instead of soda. Limit juice intake to 4 to 6 oz (½ to ¾ cup) a day. · Blend low-fat yogurt, fruit juice, and canned or frozen fruit to make a smoothie for breakfast or a snack. Where can you learn more? Go to https://[employer] [place].[employer]. org and sign in to your [employer] account. Enter R509 in the [facility] box to learn more about \"Eating Healthy Foods: Care Instructions. \"     If you do not have an account, please click on the \"Sign Up Now\" link. Current as of: December 17, 2020               Content Version: 12.9  © 6321-8743 Healthwise, Incorporated. Care instructions adapted under license by [facility] [address] If you have questions about a medical condition or this instruction, always ask your healthcare professional. [place] [address] any warranty or liability for your use of this information. Personalized Preventive Plan for Ubaldo Mata - 8/5/2021  Medicare offers a range of preventive health benefits.  Some of the tests and screenings are paid in full while other may be subject to a deductible, co-insurance, and/or copay. Some of these benefits include a comprehensive review of your medical history including lifestyle, illnesses that may run in your family, and various assessments and screenings as appropriate. After reviewing your medical record and screening and assessments performed today your provider may have ordered immunizations, labs, imaging, and/or referrals for you. A list of these orders (if applicable) as well as your Preventive Care list are included within your After Visit Summary for your review. Other Preventive Recommendations:    · A preventive eye exam performed by an eye specialist is recommended every 1-2 years to screen for glaucoma; cataracts, macular degeneration, and other eye disorders. · A preventive dental visit is recommended every 6 months. · Try to get at least 150 minutes of exercise per week or 10,000 steps per day on a pedometer . · Order or download the FREE \"Exercise & Physical Activity: Your Everyday Guide\" from [employer] [place] [employer]. Call 3-273.937.1267 or search [employer] [place] [employer] online. · You need 2774-5398 mg of calcium and 0284-7882 IU of vitamin D per day. It is possible to meet your calcium requirement with diet alone, but a vitamin D supplement is usually necessary to meet this goal.  · When exposed to the sun, use a sunscreen that protects against both UVA and UVB radiation with an SPF of 30 or greater. Reapply every 2 to 3 hours or after sweating, drying off with a towel, or swimming. · Always wear a seat belt when traveling in a car. Always wear a helmet when riding a bicycle or motorcycle.

## 2021-08-05 NOTE — PROGRESS NOTES
Medicare Annual Wellness Visit  Name: Harlan Medina Date: 2021   MRN: [id number] Sex: Male   Age: 77 y.o. Ethnicity: Non- / Non    : 1955 Race: White (non-)      Nabeel Teran is here for Medicare AWV    Screenings for behavioral, psychosocial and functional/safety risks, and cognitive dysfunction are all negative except as indicated below. These results, as well as other patient data from the [address] form, are documented in Flowsheets linked to this Encounter. Allergies   Allergen Reactions    Codeine Nausea Only    Nsaids      Kidney issues       Prior to Visit Medications    Medication Sig Taking? Authorizing Provider   simvastatin (ZOCOR) 40 MG tablet TAKE ONE TABLET BY MOUTH ONCE NIGHTLY Yes Purvi Mirza MD   losartan (COZAAR) 50 MG tablet TAKE ONE TABLET BY MOUTH DAILY Yes Purvi Mirza MD   levothyroxine (SYNTHROID) 175 MCG tablet Take 1 tablet by mouth Daily Yes Purvi Mirza MD   vitamin D3 (CHOLECALCIFEROL) 10 MCG (400 UNIT) TABS tablet Take 1 tablet by mouth daily Yes Historical Provider, MD   cyclobenzaprine (FLEXERIL) 10 MG tablet Take 1 tablet by mouth 3 times daily as needed for Muscle spasms Yes Purvi Mirza MD   Probiotic Product (ALIGN PO) Take by mouth Yes Historical Provider, MD   fluvoxaMINE (LUVOX) 100 MG tablet nightly  Yes Historical Provider, MD   pantoprazole (PROTONIX) 20 MG tablet Take 40 mg by mouth daily  Yes Historical Provider, MD   [address] [facility] Yes MD Eneida Todd LANCETS MISC USE AS DIRECTED Yes Purvi Mirza MD   busPIRone (BUSPAR) 10 MG tablet Take 1 tablet by mouth 2 times daily. Patient taking differently: 20 mg nightly 3 tablets at night Yes Purvi Mirza MD   desvenlafaxine (PRISTIQ) 50 MG TB24 Take 50 mg by mouth daily.    Yes Historical Provider, MD       Past Medical History:   Diagnosis Date    Allergic rhinitis     Anxiety     Arthritis     Bipolar Disorder     Diabetes mellitus ([facility] [address]     Prediabetic    GERD (gastroesophageal reflux disease)     Hearing loss     HYPERCHOLESTERAEMIA     Hypertension     Hypothyroid     Kidney disease     Nephritis     OCD (obsessive compulsive disorder)     PTSD     Screening PSA (prostate specific antigen) 1.3.11    result - 2.91    Sleep apnea     does not use cpap       Past Surgical History:   Procedure Laterality Date    COLONOSCOPY  4.27.05    normal, Dr. Edward Reyes COLONOSCOPY  5/16/14    normal, rhoades    EXTERNAL AUDITORY CANAL RECONSTRUCTION      left 1980 and 1995    LYMPH NODE BIOPSY  4/27/2010    removed groin, benign, dr Royce Ya      gum surgery     TONSILLECTOMY      TOTAL HIP ARTHROPLASTY Right 9/15/2020    RIGHT DIRECT ANTERIOR APPROACH TOTAL HIP ARTHROPLASTY performed by Alix Bragg MD at [address] Left 5/11/2021    DIRECT ANTERIOR APPROACH LEFT TOTAL HIP ARTHROPLASTY performed by Alix Bragg MD at [address]  5/16/14    Mario Alberto moore, check in one year    UPPER GASTROINTESTINAL ENDOSCOPY  6/1/15    Mario Alberto Odell, check one year    UPPER GASTROINTESTINAL ENDOSCOPY  06/13/2018    oscar Odell, check in 3 years       Family History   Problem Relation Age of Onset    Heart Disease Mother     Heart Disease Father     Lung Cancer Sister     Arthritis Other        CareTeam (Including outside providers/suppliers regularly involved in providing care):   Patient Care Team:  Laura Scott MD as PCP - General (Family Medicine)  Laura Scott MD as PCP - REHABILITATION [facility] Empaneled Provider  Avis Hoffman MD as Consulting Physician (Cardiology)    Wt Readings from Last 3 Encounters:   08/05/21 188 lb (85.3 kg)   08/03/21 188 lb (85.3 kg)   07/29/21 191 lb (86.6 kg)     Vitals:    08/05/21 1403   BP: 122/74   Site: Left Upper Arm   Position: Sitting   Cuff Size: Medium Adult   Temp: 97.2 °F (36.2 °C)   TempSrc: Temporal Yes  Hearing/Vision Interventions:  · Hearing concerns:  patient declines any further evaluation/treatment for hearing issues    Safety:  Safety  Do you have working smoke detectors?: Yes  Have all throw rugs been removed or fastened?: (!) No  Do you have non-slip mats or surfaces in all bathtubs/showers?: Yes  Do all of your stairways have a railing or banister?: Yes  Are your doorways, halls and stairs free of clutter?: Yes  Do you always fasten your seatbelt when you are in a car?: Yes  Safety Interventions:  · Home safety tips provided     Personalized Preventive Plan   Current Health Maintenance Status  Immunization History   Administered Date(s) Administered    COVID-19, Moderna, PF, 100mcg/0.5mL 03/01/2021, 03/29/2021    Influenza, Quadv, adjuvanted, 65 yrs +, IM, PF (Fluad) 11/04/2020    Pneumococcal Polysaccharide ([id number]) 07/27/2020    Td, unspecified formulation 06/26/2006    Tdap (Boostrix, Adacel) 07/27/2017    Zoster Live (Zostavax) 05/03/2011        Health Maintenance   Topic Date Due    Shingles Vaccine (2 of 3) 06/28/2011    Annual Wellness Visit (AWV)  Never done    Diabetic microalbuminuria test  02/06/2021    Flu vaccine (1) 09/01/2021    Diabetic foot exam  12/28/2021    TSH testing  02/19/2022    Diabetic retinal exam  03/12/2022    Lipid screen  04/29/2022    A1C test (Diabetic or Prediabetic)  07/29/2022    Potassium monitoring  07/29/2022    Creatinine monitoring  07/29/2022    Colon cancer screen colonoscopy  05/16/2024    DTaP/Tdap/Td vaccine (2 - Td or Tdap) 07/27/2027    Pneumococcal 65+ years Vaccine  Completed    COVID-19 Vaccine  Completed    Hepatitis C screen  Completed    Hepatitis A vaccine  Aged Out    Hib vaccine  Aged Out    Meningococcal (ACWY) vaccine  Aged Out     Recommendations for [employer] Due: see orders and patient instructions/AVS.  .   Recommended screening schedule for the next 5-10 years is provided to the patient in written

## 2021-08-18 LAB
HCT VFR BLD CALC: 33.9 % (ref 40–50)
HEMOGLOBIN: 10.5 G/DL (ref 13.5–16.5)

## 2021-08-25 ENCOUNTER — TELEPHONE (OUTPATIENT)
Dept: FAMILY MEDICINE CLINIC | Age: 66
End: 2021-08-25

## 2021-08-26 NOTE — TELEPHONE ENCOUNTER
[phone] ([place])  - Left msg for Conor Duque to return call.  (patient has been notified of test results-the reason I called yesterday)

## 2021-09-09 ENCOUNTER — OFFICE VISIT (OUTPATIENT)
Dept: FAMILY MEDICINE CLINIC | Age: 66
End: 2021-09-09
Payer: MEDICARE

## 2021-09-09 VITALS
HEIGHT: 63 IN | BODY MASS INDEX: 33.66 KG/M2 | SYSTOLIC BLOOD PRESSURE: 124 MMHG | TEMPERATURE: 97.2 F | DIASTOLIC BLOOD PRESSURE: 74 MMHG | WEIGHT: 190 LBS

## 2021-09-09 DIAGNOSIS — D50.9 MICROCYTIC ANEMIA: ICD-10-CM

## 2021-09-09 DIAGNOSIS — J06.9 ACUTE UPPER RESPIRATORY INFECTION, UNSPECIFIED: Primary | ICD-10-CM

## 2021-09-09 PROCEDURE — G8417 CALC BMI ABV UP PARAM F/U: HCPCS | Performed by: FAMILY MEDICINE

## 2021-09-09 PROCEDURE — G8427 DOCREV CUR MEDS BY ELIG CLIN: HCPCS | Performed by: FAMILY MEDICINE

## 2021-09-09 PROCEDURE — 4040F PNEUMOC VAC/ADMIN/RCVD: CPT | Performed by: FAMILY MEDICINE

## 2021-09-09 PROCEDURE — 3017F COLORECTAL CA SCREEN DOC REV: CPT | Performed by: FAMILY MEDICINE

## 2021-09-09 PROCEDURE — 1036F TOBACCO NON-USER: CPT | Performed by: FAMILY MEDICINE

## 2021-09-09 PROCEDURE — 99213 OFFICE O/P EST LOW 20 MIN: CPT | Performed by: FAMILY MEDICINE

## 2021-09-09 PROCEDURE — 1123F ACP DISCUSS/DSCN MKR DOCD: CPT | Performed by: FAMILY MEDICINE

## 2021-09-09 RX ORDER — AZITHROMYCIN 250 MG/1
TABLET, FILM COATED ORAL
Qty: 1 PACKET | Refills: 0 | Status: SHIPPED | OUTPATIENT
Start: 2021-09-09 | End: 2021-09-19

## 2021-09-09 NOTE — PROGRESS NOTES
Subjective:      Patient ID: Jewell Snyder is a 77 y.o. male. Chief Complaint   Patient presents with    Congestion     sore throat, itchy ears, runny nose, cough - grey/green phlegm x 3 days        Patient presents with:  Congestion: sore throat, itchy ears, runny nose, cough - grey/green phlegm x 3 days    He is here for the above  cough is productive  No fever  No sob  No exposed that he is aware    No n no v no dysphagia no gerd no early satiety no abd pain     YOB: 1955    Date of Visit:  9/9/2021     -- Codeine -- Nausea Only   -- Nsaids     --  Kidney issues    Current Outpatient Medications:  simvastatin (ZOCOR) 40 MG tablet, TAKE ONE TABLET BY MOUTH ONCE NIGHTLY, Disp: 90 tablet, Rfl: 2  losartan (COZAAR) 50 MG tablet, TAKE ONE TABLET BY MOUTH DAILY, Disp: 90 tablet, Rfl: 2  levothyroxine (SYNTHROID) 175 MCG tablet, Take 1 tablet by mouth Daily (Patient taking differently: Take 150 mcg by mouth Daily ), Disp: 90 tablet, Rfl: 1  vitamin D3 (CHOLECALCIFEROL) 10 MCG (400 UNIT) TABS tablet, Take 1 tablet by mouth daily, Disp: , Rfl:   Probiotic Product (ALIGN PO), Take by mouth, Disp: , Rfl:   fluvoxaMINE (LUVOX) 100 MG tablet, nightly , Disp: , Rfl:   pantoprazole (PROTONIX) 20 MG tablet, Take 40 mg by mouth daily , Disp: , Rfl:   busPIRone (BUSPAR) 10 MG tablet, Take 1 tablet by mouth 2 times daily. (Patient taking differently: 20 mg nightly 3 tablets at night), Disp: 60 tablet, Rfl: 4  desvenlafaxine (PRISTIQ) 50 MG TB24, Take 50 mg by mouth daily.   , Disp: , Rfl:   cyclobenzaprine (FLEXERIL) 10 MG tablet, Take 1 tablet by mouth 3 times daily as needed for Muscle spasms, Disp: 21 tablet, Rfl: 0  ONE TOUCH ULTRA TEST strip, TEST ONCE DAILY, Disp: 30 strip, Rfl: 6  ONETOUCH DELICA LANCETS MISC, USE AS DIRECTED, Disp: 4 each, Rfl: 6    No current facility-administered medications for this visit.      ---------------------------               09/09/21                      6325 ---------------------------   BP:          124/74         Site:    Left Upper Arm     Position:     Sitting        Cuff Size:  Medium Adult      Temp:   97.2 °F (36.2 °C)   TempSrc:    Temporal        Weight: 190 lb (86.2 kg)    Height:   5' 3\" (1.6 m)    ---------------------------  Body mass index is 33.66 kg/m². Wt Readings from Last 3 Encounters:  09/09/21 : 190 lb (86.2 kg)  08/05/21 : 188 lb (85.3 kg)  08/03/21 : 188 lb (85.3 kg)    BP Readings from Last 3 Encounters:  09/09/21 : 124/74  08/05/21 : 122/74 08/03/21 : 122/74        Review of Systems    Objective:   Physical Exam  Constitutional:       General: He is not in acute distress. Appearance: Normal appearance. He is well-developed. He is not ill-appearing or diaphoretic. HENT:      Head: Normocephalic and atraumatic. Right Ear: Tympanic membrane and ear canal normal.      Left Ear: Ear canal normal. Tympanic membrane is scarred. Nose: Nose normal.      Mouth/Throat:      Mouth: No oral lesions. Pharynx: Uvula midline. Pulmonary:      Effort: Pulmonary effort is normal. No tachypnea, accessory muscle usage or respiratory distress. Breath sounds: Normal breath sounds. No decreased breath sounds, wheezing, rhonchi or rales. Musculoskeletal:      Cervical back: Neck supple. Lymphadenopathy:      Head:      Right side of head: No submental or submandibular adenopathy. Left side of head: No submental or submandibular adenopathy. Cervical: No cervical adenopathy. Upper Body:      Right upper body: No supraclavicular adenopathy. Left upper body: No supraclavicular adenopathy. Skin:     General: Skin is warm and dry. Coloration: Skin is not pale. Neurological:      Mental Status: He is alert. Assessment:        Diagnosis Orders   1. Acute upper respiratory infection, unspecified  azithromycin (ZITHROMAX) 250 MG tablet   2.  Microcytic anemia  CBC Auto Differential       Orders Placed This Encounter   Medications    azithromycin (ZITHROMAX) 250 MG tablet     Sig: Take 2 tabs (500 mg) on Day 1, and take 1 tab (250 mg) on days 2 through 5.      Dispense:  1 packet     Refill:  0       He tells me he got the colon done and all ok  He is on the iron  He also tells me he has seen several ent over the years including july viveros at [facility]      Plan:      Do take the antibiotic  Call if not better          Shalonda Reyes MD

## 2021-09-13 ENCOUNTER — TELEPHONE (OUTPATIENT)
Dept: FAMILY MEDICINE CLINIC | Age: 66
End: 2021-09-13

## 2021-09-13 DIAGNOSIS — R05.9 COUGH: Primary | ICD-10-CM

## 2021-09-13 RX ORDER — SIMVASTATIN 40 MG
TABLET ORAL
Qty: 90 TABLET | Refills: 2 | Status: SHIPPED | OUTPATIENT
Start: 2021-09-13 | End: 2022-07-20

## 2021-09-13 RX ORDER — LOSARTAN POTASSIUM 50 MG/1
TABLET ORAL
Qty: 90 TABLET | Refills: 2 | Status: SHIPPED | OUTPATIENT
Start: 2021-09-13 | End: 2022-06-20

## 2021-09-13 NOTE — TELEPHONE ENCOUNTER
Steffanie Nagy MD 2 days ago     hi doctor danilo            i need my 150 mcg levothyroxine added to my chart profile and also please send it my pharmacy [facility] i also requested a refill on losaratan 50mg & simvastain 40 mg

## 2021-09-16 NOTE — TELEPHONE ENCOUNTER
Jasmina Quinones advised and verbalized understanding. He is unsure of what he was taking. He will take 175 mcg. He complains that he still has the cough - Please advise.

## 2021-09-17 ENCOUNTER — HOSPITAL ENCOUNTER (OUTPATIENT)
Dept: GENERAL RADIOLOGY | Age: 66
Discharge: HOME | End: 2021-09-17
Payer: MEDICARE

## 2021-09-17 DIAGNOSIS — R05.9 COUGH: ICD-10-CM

## 2021-09-17 PROCEDURE — 71046 X-RAY EXAM CHEST 2 VIEWS: CPT

## 2021-09-17 RX ORDER — PREDNISONE 10 MG/1
TABLET ORAL
Qty: 16 TABLET | Refills: 0 | Status: SHIPPED | OUTPATIENT
Start: 2021-09-17 | End: 2022-03-14

## 2021-10-14 LAB
BASOPHILS ABSOLUTE: 0 %
BASOPHILS ABSOLUTE: 0 THOU/MCL (ref 0–0.2)
EOSINOPHILS ABSOLUTE: 0.1 THOU/MCL (ref 0.03–0.45)
EOSINOPHILS RELATIVE PERCENT: 2 %
HCT VFR BLD CALC: 38.4 % (ref 40–50)
HEMOGLOBIN: 12.3 G/DL (ref 13.5–16.5)
LYMPHOCYTES ABSOLUTE: 1.4 THOU/MCL (ref 1–4)
LYMPHOCYTES RELATIVE PERCENT: 30 %
MCH RBC QN AUTO: 25.7 PG (ref 27–33)
MCHC RBC AUTO-ENTMCNC: 32.1 G/DL (ref 32–36)
MCV RBC AUTO: 80.1 FL (ref 82–97)
MONOCYTES # BLD: 9 %
MONOCYTES ABSOLUTE: 0.4 THOU/MCL (ref 0.2–0.9)
NEUTROPHILS ABSOLUTE: 2.7 THOU/MCL (ref 1.8–7.7)
OVALOCYTES: ABNORMAL
PDW BLD-RTO: 25 % (ref 12.3–17)
PLATELET # BLD: 171 THOU/MCL (ref 140–375)
PMV BLD AUTO: 9.3 FL (ref 7.4–11.5)
RBC # BLD: 4.79 MIL/MCL (ref 4.4–5.8)
SEG NEUTROPHILS: 59 %
WBC # BLD: 4.6 THOU/MCL (ref 3.6–10.5)

## 2021-12-16 ENCOUNTER — OFFICE VISIT (OUTPATIENT)
Dept: FAMILY MEDICINE CLINIC | Age: 66
End: 2021-12-16
Payer: MEDICARE

## 2021-12-16 VITALS
BODY MASS INDEX: 33.84 KG/M2 | DIASTOLIC BLOOD PRESSURE: 80 MMHG | HEIGHT: 63 IN | SYSTOLIC BLOOD PRESSURE: 132 MMHG | HEART RATE: 52 BPM | TEMPERATURE: 97 F | WEIGHT: 191 LBS

## 2021-12-16 DIAGNOSIS — E11.9 TYPE 2 DIABETES MELLITUS WITHOUT COMPLICATION, WITHOUT LONG-TERM CURRENT USE OF INSULIN (HCC): Primary | ICD-10-CM

## 2021-12-16 DIAGNOSIS — D50.9 IRON DEFICIENCY ANEMIA, UNSPECIFIED IRON DEFICIENCY ANEMIA TYPE: ICD-10-CM

## 2021-12-16 DIAGNOSIS — E03.9 ACQUIRED HYPOTHYROIDISM: ICD-10-CM

## 2021-12-16 PROCEDURE — G8484 FLU IMMUNIZE NO ADMIN: HCPCS | Performed by: FAMILY MEDICINE

## 2021-12-16 PROCEDURE — 2022F DILAT RTA XM EVC RTNOPTHY: CPT | Performed by: FAMILY MEDICINE

## 2021-12-16 PROCEDURE — 1036F TOBACCO NON-USER: CPT | Performed by: FAMILY MEDICINE

## 2021-12-16 PROCEDURE — 4040F PNEUMOC VAC/ADMIN/RCVD: CPT | Performed by: FAMILY MEDICINE

## 2021-12-16 PROCEDURE — 3017F COLORECTAL CA SCREEN DOC REV: CPT | Performed by: FAMILY MEDICINE

## 2021-12-16 PROCEDURE — 1123F ACP DISCUSS/DSCN MKR DOCD: CPT | Performed by: FAMILY MEDICINE

## 2021-12-16 PROCEDURE — 3044F HG A1C LEVEL LT 7.0%: CPT | Performed by: FAMILY MEDICINE

## 2021-12-16 PROCEDURE — 99214 OFFICE O/P EST MOD 30 MIN: CPT | Performed by: FAMILY MEDICINE

## 2021-12-16 PROCEDURE — G8417 CALC BMI ABV UP PARAM F/U: HCPCS | Performed by: FAMILY MEDICINE

## 2021-12-16 PROCEDURE — G8427 DOCREV CUR MEDS BY ELIG CLIN: HCPCS | Performed by: FAMILY MEDICINE

## 2021-12-16 ASSESSMENT — ENCOUNTER SYMPTOMS
ABDOMINAL PAIN: 0
NAUSEA: 0
VOMITING: 0
CHEST TIGHTNESS: 0
BLOOD IN STOOL: 0
CONSTIPATION: 0
DIARRHEA: 0
ABDOMINAL DISTENTION: 0
SHORTNESS OF BREATH: 0

## 2021-12-16 NOTE — PROGRESS NOTES
Subjective:      Patient ID: Donny Soto is a 77 y.o. male. Chief Complaint   Patient presents with    Check-Up     diabetes, lipids, hypertension, thyroid         Patient presents with:  Check-Up: diabetes, lipids, hypertension, thyroid     He is here for check up and the above    Well and really no c/o    YOB: 1955    Date of Visit:  12/16/2021     -- Codeine -- Nausea Only   -- Nsaids     --  Kidney issues    Current Outpatient Medications:  levothyroxine (SYNTHROID) 175 MCG tablet, Take 1 tablet by mouth Daily, Disp: 90 tablet, Rfl: 0  predniSONE (DELTASONE) 10 MG tablet, Prednisone 10 mg. #16. Take 2 po bid for 2 days, then 1 po bid for 3 days,  then 1 po daily for 2 days. Then stop, Disp: 16 tablet, Rfl: 0  simvastatin (ZOCOR) 40 MG tablet, TAKE ONE TABLET BY MOUTH ONCE NIGHTLY, Disp: 90 tablet, Rfl: 2  losartan (COZAAR) 50 MG tablet, TAKE ONE TABLET BY MOUTH DAILY, Disp: 90 tablet, Rfl: 2  vitamin D3 (CHOLECALCIFEROL) 10 MCG (400 UNIT) TABS tablet, Take 1 tablet by mouth daily, Disp: , Rfl:   Probiotic Product (ALIGN PO), Take by mouth, Disp: , Rfl:   fluvoxaMINE (LUVOX) 100 MG tablet, nightly , Disp: , Rfl:   pantoprazole (PROTONIX) 20 MG tablet, Take 40 mg by mouth daily , Disp: , Rfl:   busPIRone (BUSPAR) 10 MG tablet, Take 1 tablet by mouth 2 times daily. (Patient taking differently: 20 mg nightly 3 tablets at night), Disp: 60 tablet, Rfl: 4  desvenlafaxine (PRISTIQ) 50 MG TB24, Take 50 mg by mouth daily.   , Disp: , Rfl:   cyclobenzaprine (FLEXERIL) 10 MG tablet, Take 1 tablet by mouth 3 times daily as needed for Muscle spasms, Disp: 21 tablet, Rfl: 0  ONE TOUCH ULTRA TEST strip, TEST ONCE DAILY, Disp: 30 strip, Rfl: 6  ONETOUCH DELICA LANCETS MISC, USE AS DIRECTED, Disp: 4 each, Rfl: 6    No current facility-administered medications for this visit.      --------------------------               12/16/21                     1341        --------------------------   BP: 132/80        Site:    Left Upper Arm    Position:    Sitting        Cuff Size:  Medium Adult     Pulse:         52          Temp:   97 °F (36.1 °C)    TempSrc:    Temporal       Weight: 191 lb (86.6 kg)   Height:  5' 3\" (1.6 m)    --------------------------  Body mass index is 33.83 kg/m². Wt Readings from Last 3 Encounters:  12/16/21 : 191 lb (86.6 kg)  09/09/21 : 190 lb (86.2 kg)  08/05/21 : 188 lb (85.3 kg)    BP Readings from Last 3 Encounters:  12/16/21 : 132/80  09/09/21 : 124/74  08/05/21 : 122/74        Review of Systems   Constitutional: Negative for appetite change, chills, fever and unexpected weight change. Respiratory: Negative for chest tightness and shortness of breath. Cardiovascular: Negative for chest pain, palpitations and leg swelling. Gastrointestinal: Negative for abdominal distention, abdominal pain, blood in stool, constipation, diarrhea, nausea and vomiting. Genitourinary: Negative for difficulty urinating, dysuria and hematuria. Neurological:        Occ headache       Objective:   Physical Exam  Constitutional:       General: He is not in acute distress. Appearance: Normal appearance. He is well-developed. He is not ill-appearing or diaphoretic. HENT:      Head: Normocephalic and atraumatic. Cardiovascular:      Rate and Rhythm: Normal rate and regular rhythm. Heart sounds: Normal heart sounds. No murmur heard. No friction rub. No gallop. Comments:     Pulmonary:      Effort: Pulmonary effort is normal. No tachypnea, accessory muscle usage or respiratory distress. Breath sounds: Normal breath sounds. No decreased breath sounds, wheezing, rhonchi or rales. Chest:   Breasts:      Right: No supraclavicular adenopathy. Left: No supraclavicular adenopathy. Abdominal:      General: Bowel sounds are normal. There is no distension or abdominal bruit. Palpations: Abdomen is soft.  There is no hepatomegaly, splenomegaly, mass or pulsatile mass. Tenderness: There is no abdominal tenderness. There is no guarding. Lymphadenopathy:      Cervical: No cervical adenopathy. Upper Body:      Right upper body: No supraclavicular adenopathy. Left upper body: No supraclavicular adenopathy. Skin:     General: Skin is warm and dry. Coloration: Skin is not pale. Nails: There is no clubbing. Neurological:      Mental Status: He is alert. Assessment:        Diagnosis Orders   1. Type 2 diabetes mellitus without complication, without long-term current use of insulin ([facility])  Basic Metabolic Panel    Hemoglobin A1C   2. Iron deficiency anemia, unspecified iron deficiency anemia type  CBC Auto Differential    Iron and TIBC   3.  Acquired hypothyroidism  TSH without Reflex         Lab Results   Component Value Date    LABA1C 6.4 07/29/2021     Lab Results   Component Value Date    .0 07/29/2021         Since last visit received his egd and colon done on 8/24./21  Reviewed report colon fine and check in 10 years and egd showed gastritis      Plan:      Stay with the diet  Continue the medicines  See us in 4 months         Brennan Andres MD

## 2022-01-03 LAB
ANION GAP SERPL CALCULATED.3IONS-SCNC: 7 MMOL/L (ref 6–18)
BASOPHILS ABSOLUTE: 0 %
BASOPHILS ABSOLUTE: 0 THOU/MCL (ref 0–0.2)
BUN BLDV-MCNC: 30 MG/DL (ref 8–26)
CALCIUM SERPL-MCNC: 9.9 MG/DL (ref 8.5–10.4)
CHLORIDE BLD-SCNC: 104 MEQ/L (ref 98–111)
CO2: 31 MMOL/L (ref 21–31)
CREAT SERPL-MCNC: 1.39 MG/DL (ref 0.7–1.3)
EGFR (CKD-EPI): 56 ML/MIN/1.73 M2
EOSINOPHILS ABSOLUTE: 0.1 THOU/MCL (ref 0.03–0.45)
EOSINOPHILS RELATIVE PERCENT: 2 %
GLUCOSE BLD-MCNC: 106 MG/DL (ref 70–99)
HCT VFR BLD CALC: 43.8 % (ref 40–50)
HEMOGLOBIN: 14.7 G/DL (ref 13.5–16.5)
IRON SATURATION: 28 % (ref 15–55)
IRON: 101 MCG/DL (ref 50–212)
LYMPHOCYTES ABSOLUTE: 1.6 THOU/MCL (ref 1–4)
LYMPHOCYTES RELATIVE PERCENT: 29 %
MCH RBC QN AUTO: 28.2 PG (ref 27–33)
MCHC RBC AUTO-ENTMCNC: 33.6 G/DL (ref 32–36)
MCV RBC AUTO: 83.9 FL (ref 82–97)
MONOCYTES # BLD: 9 %
MONOCYTES ABSOLUTE: 0.5 THOU/MCL (ref 0.2–0.9)
NEUTROPHILS ABSOLUTE: 3.3 THOU/MCL (ref 1.8–7.7)
PDW BLD-RTO: 14.9 % (ref 12.3–17)
PLATELET # BLD: 186 THOU/MCL (ref 140–375)
PMV BLD AUTO: 9 FL (ref 7.4–11.5)
POTASSIUM SERPL-SCNC: 4.7 MEQ/L (ref 3.6–5.1)
RBC # BLD: 5.22 MIL/MCL (ref 4.4–5.8)
SEG NEUTROPHILS: 60 %
SODIUM BLD-SCNC: 142 MEQ/L (ref 135–145)
TOTAL IRON BINDING CAPACITY: 360 MCG/DL (ref 260–450)
TRANSFERRIN: 257 MG/DL (ref 203–362)
WBC # BLD: 5.4 THOU/MCL (ref 3.6–10.5)

## 2022-01-04 LAB
ESTIMATED AVERAGE GLUCOSE: 146 MG/DL
HBA1C MFR BLD: 6.7 % (ref 4.2–5.6)

## 2022-03-14 ENCOUNTER — OFFICE VISIT (OUTPATIENT)
Dept: FAMILY MEDICINE CLINIC | Age: 67
End: 2022-03-14
Payer: MEDICARE

## 2022-03-14 VITALS
BODY MASS INDEX: 34.2 KG/M2 | TEMPERATURE: 97.3 F | SYSTOLIC BLOOD PRESSURE: 110 MMHG | DIASTOLIC BLOOD PRESSURE: 78 MMHG | HEIGHT: 63 IN | WEIGHT: 193 LBS

## 2022-03-14 DIAGNOSIS — F43.21 GRIEF REACTION: Primary | ICD-10-CM

## 2022-03-14 PROCEDURE — 3017F COLORECTAL CA SCREEN DOC REV: CPT | Performed by: FAMILY MEDICINE

## 2022-03-14 PROCEDURE — G8417 CALC BMI ABV UP PARAM F/U: HCPCS | Performed by: FAMILY MEDICINE

## 2022-03-14 PROCEDURE — G8427 DOCREV CUR MEDS BY ELIG CLIN: HCPCS | Performed by: FAMILY MEDICINE

## 2022-03-14 PROCEDURE — 99212 OFFICE O/P EST SF 10 MIN: CPT | Performed by: FAMILY MEDICINE

## 2022-03-14 PROCEDURE — G8484 FLU IMMUNIZE NO ADMIN: HCPCS | Performed by: FAMILY MEDICINE

## 2022-03-14 PROCEDURE — 1123F ACP DISCUSS/DSCN MKR DOCD: CPT | Performed by: FAMILY MEDICINE

## 2022-03-14 PROCEDURE — 1036F TOBACCO NON-USER: CPT | Performed by: FAMILY MEDICINE

## 2022-03-14 PROCEDURE — 4040F PNEUMOC VAC/ADMIN/RCVD: CPT | Performed by: FAMILY MEDICINE

## 2022-03-14 NOTE — PATIENT INSTRUCTIONS
Do go back up on the buspar  don't cut back on the pristiq  Do contact the psychiatrist  Call me for any concerns

## 2022-03-14 NOTE — PROGRESS NOTES
Subjective:      Patient ID: Maeve Burks is a 77 y.o. male. Chief Complaint   Patient presents with    Depression     dog passed away on Friday evening        Patient presents with:  Depression: dog passed away on Friday evening    Feels down due to the above  He was doing well and was baking off the buspar but he will restart   Was crying a lot    He denies being suicidal   He denies cp  No sob   appetite is fair  He has been cutting back on pristiq and buspar up to this point on his own    YOB: 1955    Date of Visit:  3/14/2022     -- Codeine -- Nausea Only   -- Nsaids     --  Kidney issues    Current Outpatient Medications:  levothyroxine (SYNTHROID) 175 MCG tablet, Take 1 tablet by mouth Daily, Disp: 90 tablet, Rfl: 0  simvastatin (ZOCOR) 40 MG tablet, TAKE ONE TABLET BY MOUTH ONCE NIGHTLY, Disp: 90 tablet, Rfl: 2  losartan (COZAAR) 50 MG tablet, TAKE ONE TABLET BY MOUTH DAILY, Disp: 90 tablet, Rfl: 2  vitamin D3 (CHOLECALCIFEROL) 10 MCG (400 UNIT) TABS tablet, Take 1 tablet by mouth daily, Disp: , Rfl:   Probiotic Product (ALIGN PO), Take by mouth, Disp: , Rfl:   fluvoxaMINE (LUVOX) 100 MG tablet, nightly , Disp: , Rfl:   pantoprazole (PROTONIX) 20 MG tablet, Take 40 mg by mouth daily , Disp: , Rfl:   busPIRone (BUSPAR) 10 MG tablet, Take 1 tablet by mouth 2 times daily. (Patient taking differently: 20 mg nightly 3 tablets at night), Disp: 60 tablet, Rfl: 4  desvenlafaxine (PRISTIQ) 50 MG TB24, Take 50 mg by mouth daily.   , Disp: , Rfl:   cyclobenzaprine (FLEXERIL) 10 MG tablet, Take 1 tablet by mouth 3 times daily as needed for Muscle spasms, Disp: 21 tablet, Rfl: 0  ONE TOUCH ULTRA TEST strip, TEST ONCE DAILY, Disp: 30 strip, Rfl: 6  ONETOUCH DELICA LANCETS MISC, USE AS DIRECTED, Disp: 4 each, Rfl: 6    No current facility-administered medications for this visit.      ---------------------------               03/14/22                      3377 ---------------------------   BP:          110/78         Site:    Left Upper Arm     Position:     Sitting        Cuff Size:   Large Adult      Temp:   97.3 °F (36.3 °C)   TempSrc:    Temporal        Weight: 193 lb (87.5 kg)    Height:   5' 3\" (1.6 m)    ---------------------------  Body mass index is 34.19 kg/m². Wt Readings from Last 3 Encounters:  03/14/22 : 193 lb (87.5 kg)  12/16/21 : 191 lb (86.6 kg)  09/09/21 : 190 lb (86.2 kg)    BP Readings from Last 3 Encounters:  03/14/22 : 110/78  12/16/21 : 132/80  09/09/21 : 124/74                    Review of Systems    Objective:   Physical Exam  Constitutional:       General: He is not in acute distress. Appearance: Normal appearance. He is not ill-appearing. Neurological:      Mental Status: He is alert. Psychiatric:         Attention and Perception: Attention normal.         Speech: Speech normal.         Behavior: Behavior normal.         Thought Content: Thought content does not include suicidal ideation. Thought content does not include suicidal plan. Assessment:        Diagnosis Orders   1.  Grief reaction         Discussed management  Discussed going back to his meds  He needs to discuss with his mental health providors      Plan:      Do go back up on the buspar  don't cut back on the pristiq  Do contact the psychiatrist  Call me for any concerns        Yuri Kay MD

## 2022-03-29 ENCOUNTER — TELEPHONE (OUTPATIENT)
Dept: FAMILY MEDICINE CLINIC | Age: 67
End: 2022-03-29

## 2022-03-29 NOTE — TELEPHONE ENCOUNTER
----- Message from Lori Walker sent at 3/29/2022 11:16 AM EDT -----  Subject: Appointment Request    Reason for Call: Semi-Routine Cough, Cold Symptoms    QUESTIONS  Type of Appointment? Established Patient  Reason for appointment request? Available appointments did not meet   patient need  Additional Information for Provider? patient is wanting to get in for a   sore throat. He has had it before . He wants to see PCP Efe Kemp only   ---------------------------------------------------------------------------  --------------  Neema HAYWARD  What is the best way for the office to contact you? OK to leave message on   voicemail  Preferred Call Back Phone Number? [phone]  ---------------------------------------------------------------------------  --------------  SCRIPT ANSWERS  Relationship to Patient? Self  Are you currently unable to finish sentences due to any difficulty   breathing? No  Are you unable to swallow liquids? No  Are you having fevers (100.4 or greater), chills, or sweats? No  Do you have COPD, asthma or a chronic lung condition? No  Have your symptoms been present for more than 5 days? No  Have you recently (14 days) been seen by a provider for this issue? No  Have you been diagnosed with, awaiting test results for, or told that you   are suspected of having COVID-19 (Coronavirus)? (If patient has tested   negative or was tested as a requirement for work, school, or travel and   not based on symptoms, answer no)? No  Within the past 10 days have you developed any of the following symptoms   (answer no if symptoms have been present longer than 10 days or began   more than 10 days ago)? Fever or Chills, Cough, Shortness of breath or   difficulty breathing, Loss of taste or smell, Sore throat, Nasal   congestion, Sneezing or runny nose, Fatigue or generalized body aches   (answer no if pain is specific to a body part e.g. back pain), Diarrhea,   Headache?  Yes

## 2022-04-04 ENCOUNTER — TELEMEDICINE (OUTPATIENT)
Dept: FAMILY MEDICINE CLINIC | Age: 67
End: 2022-04-04
Payer: MEDICARE

## 2022-04-04 DIAGNOSIS — J40 BRONCHITIS: Primary | ICD-10-CM

## 2022-04-04 PROCEDURE — 1123F ACP DISCUSS/DSCN MKR DOCD: CPT | Performed by: FAMILY MEDICINE

## 2022-04-04 PROCEDURE — G8427 DOCREV CUR MEDS BY ELIG CLIN: HCPCS | Performed by: FAMILY MEDICINE

## 2022-04-04 PROCEDURE — 3017F COLORECTAL CA SCREEN DOC REV: CPT | Performed by: FAMILY MEDICINE

## 2022-04-04 PROCEDURE — 4040F PNEUMOC VAC/ADMIN/RCVD: CPT | Performed by: FAMILY MEDICINE

## 2022-04-04 PROCEDURE — 99213 OFFICE O/P EST LOW 20 MIN: CPT | Performed by: FAMILY MEDICINE

## 2022-04-04 RX ORDER — AZITHROMYCIN 250 MG/1
TABLET, FILM COATED ORAL
Qty: 1 PACKET | Refills: 0 | Status: SHIPPED | OUTPATIENT
Start: 2022-04-04 | End: 2022-04-14

## 2022-04-04 NOTE — PROGRESS NOTES
Subjective:      Patient ID: Janet Mallory is a 79 y.o. male. Chief Complaint   Patient presents with    Congestion     phlegm, blowing nose x 1 week -[phone]        Patient presents with:  Congestion: phlegm, blowing nose x 1 week -[phone]    Cough productive nasal d/c no fever  No itchy eyes no sneeze  Around family members ill   No sob  No sore throat no ear pain    YOB: 1955    Date of Visit:  4/4/2022     -- Codeine -- Nausea Only   -- Nsaids     --  Kidney issues    Current Outpatient Medications:  levothyroxine (SYNTHROID) 175 MCG tablet, Take 1 tablet by mouth Daily, Disp: 90 tablet, Rfl: 0  simvastatin (ZOCOR) 40 MG tablet, TAKE ONE TABLET BY MOUTH ONCE NIGHTLY, Disp: 90 tablet, Rfl: 2  losartan (COZAAR) 50 MG tablet, TAKE ONE TABLET BY MOUTH DAILY, Disp: 90 tablet, Rfl: 2  vitamin D3 (CHOLECALCIFEROL) 10 MCG (400 UNIT) TABS tablet, Take 1 tablet by mouth daily, Disp: , Rfl:   Probiotic Product (ALIGN PO), Take by mouth, Disp: , Rfl:   fluvoxaMINE (LUVOX) 100 MG tablet, nightly , Disp: , Rfl:   pantoprazole (PROTONIX) 20 MG tablet, Take 40 mg by mouth daily , Disp: , Rfl:   busPIRone (BUSPAR) 10 MG tablet, Take 1 tablet by mouth 2 times daily. (Patient taking differently: 20 mg nightly 3 tablets at night), Disp: 60 tablet, Rfl: 4  desvenlafaxine (PRISTIQ) 50 MG TB24, Take 50 mg by mouth daily. , Disp: , Rfl:   cyclobenzaprine (FLEXERIL) 10 MG tablet, Take 1 tablet by mouth 3 times daily as needed for Muscle spasms, Disp: 21 tablet, Rfl: 0  ONE TOUCH ULTRA TEST strip, TEST ONCE DAILY, Disp: 30 strip, Rfl: 6  ONETOUCH DELICA LANCETS MISC, USE AS DIRECTED, Disp: 4 each, Rfl: 6    No current facility-administered medications for this visit. There were no vitals filed for this visit. There is no height or weight on file to calculate BMI.      Wt Readings from Last 3 Encounters:  03/14/22 : 193 lb (87.5 kg)  12/16/21 : 191 lb (86.6 kg)  09/09/21 : 190 lb (86.2 kg)    BP Readings from Last 3 Encounters:  03/14/22 : 110/78  12/16/21 : 132/80  09/09/21 : 124/74        Review of Systems    Objective:   Physical Exam  Constitutional:       General: He is not in acute distress. Appearance: Normal appearance. He is not ill-appearing. Comments: He is in his car. Wife with him  Alert and no distress and no resp symptoms. Appears well     Neurological:      Mental Status: He is alert. Assessment:       Diagnosis Orders   1. Bronchitis  azithromycin (ZITHROMAX) 250 MG tablet     Orders Placed This Encounter   Medications    azithromycin (ZITHROMAX) 250 MG tablet     Sig: Take 2 tabs (500 mg) on Day 1, and take 1 tab (250 mg) on days 2 through 5. Dispense:  1 packet     Refill:  0     balwinder is  being evaluated by a Virtual Visit (video visit) encounter to address concerns as mentioned above. A caregiver was present when appropriate. Due to this being a TeleHealth encounter ([facility]), evaluation of the following organ systems was limited: Vitals/Constitutional/EENT/Resp/CV/GI//MS/Neuro/Skin/Heme-Lymph-Imm. Pursuant to the emergency declaration under the [facility], [address] [place] [address] authority and the [employer] Act, this Virtual Visit was conducted with patient's (and/or legal guardian's) consent, to reduce the patient's risk of exposure to COVID-19 and provide necessary medical care. The patient (and/or legal guardian) has also been advised to contact this office for worsening conditions or problems, and seek emergency medical treatment and/or call 911 if deemed necessary. also aware of billable service and includes applicable copay. Visit conducted with consent of patient or legal guardian.  [place] Licensed to practice in [facility] that video visit conducts with         Services were provided through a video synchronous discussion virtually to substitute for in-person clinic visit. Patient and provider were located at their individual homes.            Plan:       Do take the antibiotic  Rest call if not improved        Anita Cueto MD

## 2022-04-21 ENCOUNTER — OFFICE VISIT (OUTPATIENT)
Dept: FAMILY MEDICINE CLINIC | Age: 67
End: 2022-04-21
Payer: MEDICARE

## 2022-04-21 VITALS
WEIGHT: 196 LBS | TEMPERATURE: 97.2 F | HEART RATE: 56 BPM | DIASTOLIC BLOOD PRESSURE: 78 MMHG | HEIGHT: 63 IN | SYSTOLIC BLOOD PRESSURE: 132 MMHG | BODY MASS INDEX: 34.73 KG/M2

## 2022-04-21 DIAGNOSIS — E03.9 ACQUIRED HYPOTHYROIDISM: ICD-10-CM

## 2022-04-21 DIAGNOSIS — E11.9 TYPE 2 DIABETES MELLITUS WITHOUT COMPLICATION, WITHOUT LONG-TERM CURRENT USE OF INSULIN (HCC): ICD-10-CM

## 2022-04-21 DIAGNOSIS — I10 PRIMARY HYPERTENSION: ICD-10-CM

## 2022-04-21 DIAGNOSIS — I10 PRIMARY HYPERTENSION: Primary | ICD-10-CM

## 2022-04-21 DIAGNOSIS — Z12.5 PROSTATE CANCER SCREENING: ICD-10-CM

## 2022-04-21 PROCEDURE — 1036F TOBACCO NON-USER: CPT | Performed by: FAMILY MEDICINE

## 2022-04-21 PROCEDURE — 99214 OFFICE O/P EST MOD 30 MIN: CPT | Performed by: FAMILY MEDICINE

## 2022-04-21 PROCEDURE — G8427 DOCREV CUR MEDS BY ELIG CLIN: HCPCS | Performed by: FAMILY MEDICINE

## 2022-04-21 PROCEDURE — G8417 CALC BMI ABV UP PARAM F/U: HCPCS | Performed by: FAMILY MEDICINE

## 2022-04-21 PROCEDURE — 3017F COLORECTAL CA SCREEN DOC REV: CPT | Performed by: FAMILY MEDICINE

## 2022-04-21 PROCEDURE — 4040F PNEUMOC VAC/ADMIN/RCVD: CPT | Performed by: FAMILY MEDICINE

## 2022-04-21 PROCEDURE — 2022F DILAT RTA XM EVC RTNOPTHY: CPT | Performed by: FAMILY MEDICINE

## 2022-04-21 PROCEDURE — 3044F HG A1C LEVEL LT 7.0%: CPT | Performed by: FAMILY MEDICINE

## 2022-04-21 PROCEDURE — 1123F ACP DISCUSS/DSCN MKR DOCD: CPT | Performed by: FAMILY MEDICINE

## 2022-04-21 ASSESSMENT — PATIENT HEALTH QUESTIONNAIRE - PHQ9
1. LITTLE INTEREST OR PLEASURE IN DOING THINGS: 0
SUM OF ALL RESPONSES TO PHQ QUESTIONS 1-9: 0
2. FEELING DOWN, DEPRESSED OR HOPELESS: 0
SUM OF ALL RESPONSES TO PHQ QUESTIONS 1-9: 0
SUM OF ALL RESPONSES TO PHQ QUESTIONS 1-9: 0
9. THOUGHTS THAT YOU WOULD BE BETTER OFF DEAD, OR OF HURTING YOURSELF: 0
10. IF YOU CHECKED OFF ANY PROBLEMS, HOW DIFFICULT HAVE THESE PROBLEMS MADE IT FOR YOU TO DO YOUR WORK, TAKE CARE OF THINGS AT HOME, OR GET ALONG WITH OTHER PEOPLE: 0
4. FEELING TIRED OR HAVING LITTLE ENERGY: 0
2. FEELING DOWN, DEPRESSED OR HOPELESS: 0
8. MOVING OR SPEAKING SO SLOWLY THAT OTHER PEOPLE COULD HAVE NOTICED. OR THE OPPOSITE, BEING SO FIGETY OR RESTLESS THAT YOU HAVE BEEN MOVING AROUND A LOT MORE THAN USUAL: 0
7. TROUBLE CONCENTRATING ON THINGS, SUCH AS READING THE NEWSPAPER OR WATCHING TELEVISION: 0
SUM OF ALL RESPONSES TO PHQ QUESTIONS 1-9: 0
3. TROUBLE FALLING OR STAYING ASLEEP: 0
SUM OF ALL RESPONSES TO PHQ QUESTIONS 1-9: 0
SUM OF ALL RESPONSES TO PHQ9 QUESTIONS 1 & 2: 0
6. FEELING BAD ABOUT YOURSELF - OR THAT YOU ARE A FAILURE OR HAVE LET YOURSELF OR YOUR FAMILY DOWN: 0
1. LITTLE INTEREST OR PLEASURE IN DOING THINGS: 0
SUM OF ALL RESPONSES TO PHQ9 QUESTIONS 1 & 2: 0
SUM OF ALL RESPONSES TO PHQ QUESTIONS 1-9: 0
SUM OF ALL RESPONSES TO PHQ QUESTIONS 1-9: 0
5. POOR APPETITE OR OVEREATING: 0
SUM OF ALL RESPONSES TO PHQ QUESTIONS 1-9: 0

## 2022-04-21 ASSESSMENT — ENCOUNTER SYMPTOMS: SHORTNESS OF BREATH: 0

## 2022-04-21 NOTE — PROGRESS NOTES
Subjective:      Patient ID: Harriet Green is a 79 y.o. male. Chief Complaint   Patient presents with    Follow-up     diabetes, hypertension, lipids, thyroid        Patient presents with: Follow-up: diabetes, hypertension, lipids, thyroid    Here for the above  He is seeing dr Armando Krause next week to follow up on his depression     No plans to harm self    He carried 60 boxes of laminate [place] from garage into the house and no cp no sob  meds the same     YOB: 1955    Date of Visit:  4/21/2022     -- Codeine -- Nausea Only   -- Nsaids     --  Kidney issues    Current Outpatient Medications:  levothyroxine (SYNTHROID) 175 MCG tablet, Take 1 tablet by mouth Daily, Disp: 90 tablet, Rfl: 0  simvastatin (ZOCOR) 40 MG tablet, TAKE ONE TABLET BY MOUTH ONCE NIGHTLY, Disp: 90 tablet, Rfl: 2  losartan (COZAAR) 50 MG tablet, TAKE ONE TABLET BY MOUTH DAILY, Disp: 90 tablet, Rfl: 2  vitamin D3 (CHOLECALCIFEROL) 10 MCG (400 UNIT) TABS tablet, Take 1 tablet by mouth daily, Disp: , Rfl:   Probiotic Product (ALIGN PO), Take by mouth, Disp: , Rfl:   fluvoxaMINE (LUVOX) 100 MG tablet, nightly , Disp: , Rfl:   pantoprazole (PROTONIX) 20 MG tablet, Take 40 mg by mouth daily , Disp: , Rfl:   busPIRone (BUSPAR) 10 MG tablet, Take 1 tablet by mouth 2 times daily. (Patient taking differently: 20 mg nightly 3 tablets at night), Disp: 60 tablet, Rfl: 4  desvenlafaxine (PRISTIQ) 50 MG TB24, Take 50 mg by mouth daily.   , Disp: , Rfl:   ONE TOUCH ULTRA TEST strip, TEST ONCE DAILY, Disp: 30 strip, Rfl: 6  ONETOUCH DELICA LANCETS MISC, USE AS DIRECTED, Disp: 4 each, Rfl: 6    No current facility-administered medications for this visit.      ---------------------------               04/21/22                      1308         ---------------------------   BP:          132/78         Site:    Left Upper Arm     Position:     Sitting        Cuff Size:   Large Adult      Pulse:         56 Temp:   97.2 °F (36.2 °C)   TempSrc:    Temporal        Weight: 196 lb (88.9 kg)    Height:   5' 3\" (1.6 m)    ---------------------------  Body mass index is 34.72 kg/m². Wt Readings from Last 3 Encounters:  04/21/22 : 196 lb (88.9 kg)  03/14/22 : 193 lb (87.5 kg)  12/16/21 : 191 lb (86.6 kg)    BP Readings from Last 3 Encounters:  04/21/22 : 132/78  03/14/22 : 110/78  12/16/21 : 132/80            Review of Systems   Constitutional: Negative for appetite change, chills, fever and unexpected weight change. Respiratory: Negative for shortness of breath. Cardiovascular: Negative for chest pain and palpitations. Objective:   Physical Exam  Constitutional:       General: He is not in acute distress. Appearance: Normal appearance. He is well-developed. He is not ill-appearing or diaphoretic. Neck:      Thyroid: No thyroid mass or thyromegaly. Cardiovascular:      Rate and Rhythm: Normal rate and regular rhythm. Heart sounds: Normal heart sounds. No murmur heard. No friction rub. No gallop. Pulmonary:      Effort: Pulmonary effort is normal. No tachypnea, accessory muscle usage or respiratory distress. Breath sounds: Normal breath sounds. No decreased breath sounds, wheezing, rhonchi or rales. Chest:   Breasts:      Right: No supraclavicular adenopathy. Left: No supraclavicular adenopathy. Musculoskeletal:      Cervical back: Neck supple. Lymphadenopathy:      Cervical: No cervical adenopathy. Upper Body:      Right upper body: No supraclavicular adenopathy. Left upper body: No supraclavicular adenopathy. Skin:     General: Skin is warm and dry. Coloration: Skin is not pale. Neurological:      Mental Status: He is alert. Assessment:       Diagnosis Orders   1. Primary hypertension  Lipid Panel   2. Type 2 diabetes mellitus without complication, without long-term current use of insulin ([facility])  Hemoglobin A1C    Lipid Panel   3. Acquired hypothyroidism  TSH   4. Prostate cancer screening  PSA Screening     Lab Results   Component Value Date    LABA1C 6.7 (H) 01/03/2022     Lab Results   Component Value Date     01/03/2022     He is being treated with just diet alone      Plan:      Continue the diet  Stay with the medicines  See me back in about 5 months         Neal Matthews MD

## 2022-04-22 LAB
CHOLESTEROL, TOTAL: 157 MG/DL (ref 0–199)
ESTIMATED AVERAGE GLUCOSE: 134.1 MG/DL
HBA1C MFR BLD: 6.3 %
HDLC SERPL-MCNC: 61 MG/DL (ref 40–60)
LDL CHOLESTEROL CALCULATED: 83 MG/DL
PROSTATE SPECIFIC ANTIGEN: 2.19 NG/ML (ref 0–4)
TRIGL SERPL-MCNC: 66 MG/DL (ref 0–150)
TSH SERPL DL<=0.05 MIU/L-ACNC: 0.85 UIU/ML (ref 0.27–4.2)
VLDLC SERPL CALC-MCNC: 13 MG/DL

## 2022-06-13 RX ORDER — LEVOTHYROXINE SODIUM 175 UG/1
TABLET ORAL
Qty: 90 TABLET | Refills: 2 | Status: SHIPPED | OUTPATIENT
Start: 2022-06-13

## 2022-06-20 RX ORDER — LOSARTAN POTASSIUM 50 MG/1
TABLET ORAL
Qty: 90 TABLET | Refills: 2 | Status: SHIPPED | OUTPATIENT
Start: 2022-06-20

## 2022-07-18 ENCOUNTER — OFFICE VISIT (OUTPATIENT)
Dept: FAMILY MEDICINE CLINIC | Age: 67
End: 2022-07-18
Payer: MEDICARE

## 2022-07-18 VITALS
WEIGHT: 195 LBS | HEIGHT: 63 IN | SYSTOLIC BLOOD PRESSURE: 122 MMHG | TEMPERATURE: 97.6 F | BODY MASS INDEX: 34.55 KG/M2 | DIASTOLIC BLOOD PRESSURE: 80 MMHG

## 2022-07-18 DIAGNOSIS — T14.8XXA PUNCTURE WOUND: Primary | ICD-10-CM

## 2022-07-18 PROCEDURE — G8427 DOCREV CUR MEDS BY ELIG CLIN: HCPCS | Performed by: FAMILY MEDICINE

## 2022-07-18 PROCEDURE — 1123F ACP DISCUSS/DSCN MKR DOCD: CPT | Performed by: FAMILY MEDICINE

## 2022-07-18 PROCEDURE — 3017F COLORECTAL CA SCREEN DOC REV: CPT | Performed by: FAMILY MEDICINE

## 2022-07-18 PROCEDURE — G8417 CALC BMI ABV UP PARAM F/U: HCPCS | Performed by: FAMILY MEDICINE

## 2022-07-18 PROCEDURE — 1036F TOBACCO NON-USER: CPT | Performed by: FAMILY MEDICINE

## 2022-07-18 PROCEDURE — 99213 OFFICE O/P EST LOW 20 MIN: CPT | Performed by: FAMILY MEDICINE

## 2022-07-18 NOTE — PROGRESS NOTES
Subjective:      Patient ID: Blank Mosley is a 79 y.o. male. Chief Complaint   Patient presents with    Follow-Up from [facility] 7-         Patient presents with: Follow-Up from Hospital: [facility] 7-     Here for the above  Stepped on nail  He is feeling well no fever    YOB: 1955    Date of Visit:  7/18/2022     -- Codeine -- Nausea Only   -- Nsaids     --  Kidney issues    Current Outpatient Medications:  losartan (COZAAR) 50 MG tablet, TAKE ONE TABLET BY MOUTH DAILY, Disp: 90 tablet, Rfl: 2  levothyroxine (SYNTHROID) 175 MCG tablet, TAKE ONE TABLET BY MOUTH DAILY, Disp: 90 tablet, Rfl: 2  simvastatin (ZOCOR) 40 MG tablet, TAKE ONE TABLET BY MOUTH ONCE NIGHTLY, Disp: 90 tablet, Rfl: 2  vitamin D3 (CHOLECALCIFEROL) 10 MCG (400 UNIT) TABS tablet, Take 1 tablet by mouth daily, Disp: , Rfl:   Probiotic Product (ALIGN PO), Take by mouth, Disp: , Rfl:   fluvoxaMINE (LUVOX) 100 MG tablet, nightly , Disp: , Rfl:   pantoprazole (PROTONIX) 20 MG tablet, Take 40 mg by mouth daily , Disp: , Rfl:   busPIRone (BUSPAR) 10 MG tablet, Take 1 tablet by mouth 2 times daily. (Patient taking differently: 20 mg nightly 3 tablets at night), Disp: 60 tablet, Rfl: 4  desvenlafaxine succinate (PRISTIQ) 50 MG TB24 extended release tablet, Take 50 mg by mouth daily.   , Disp: , Rfl:   ONE TOUCH ULTRA TEST strip, TEST ONCE DAILY, Disp: 30 strip, Rfl: 6  ONETOUCH DELICA LANCETS MISC, USE AS DIRECTED, Disp: 4 each, Rfl: 6    No current facility-administered medications for this visit.      ---------------------------               07/18/22                      1503         ---------------------------   BP:          122/80         Site:    Left Upper Arm     Position:     Sitting        Cuff Size:  Medium Adult      Temp:   97.6 °F (36.4 °C)   TempSrc:    Temporal        Weight: 195 lb (88.5 kg)    Height:   5' 3\" (1.6 m)

## 2022-07-18 NOTE — PATIENT INSTRUCTIONS
Continue the medicine  Keep the foot elevated when you sit   Keep the area clean with soap and water

## 2022-07-20 RX ORDER — SIMVASTATIN 40 MG
TABLET ORAL
Qty: 90 TABLET | Refills: 2 | Status: SHIPPED | OUTPATIENT
Start: 2022-07-20

## 2022-09-22 ENCOUNTER — OFFICE VISIT (OUTPATIENT)
Dept: FAMILY MEDICINE CLINIC | Age: 67
End: 2022-09-22
Payer: MEDICARE

## 2022-09-22 VITALS
SYSTOLIC BLOOD PRESSURE: 128 MMHG | HEART RATE: 60 BPM | TEMPERATURE: 97.3 F | WEIGHT: 198 LBS | DIASTOLIC BLOOD PRESSURE: 78 MMHG | HEIGHT: 63 IN | BODY MASS INDEX: 35.08 KG/M2

## 2022-09-22 DIAGNOSIS — I10 PRIMARY HYPERTENSION: ICD-10-CM

## 2022-09-22 DIAGNOSIS — G44.52 NEW DAILY PERSISTENT HEADACHE: ICD-10-CM

## 2022-09-22 DIAGNOSIS — E11.9 TYPE 2 DIABETES MELLITUS WITHOUT COMPLICATION, WITHOUT LONG-TERM CURRENT USE OF INSULIN (HCC): Primary | ICD-10-CM

## 2022-09-22 PROCEDURE — 99214 OFFICE O/P EST MOD 30 MIN: CPT | Performed by: FAMILY MEDICINE

## 2022-09-22 PROCEDURE — 1036F TOBACCO NON-USER: CPT | Performed by: FAMILY MEDICINE

## 2022-09-22 PROCEDURE — G8427 DOCREV CUR MEDS BY ELIG CLIN: HCPCS | Performed by: FAMILY MEDICINE

## 2022-09-22 PROCEDURE — G8417 CALC BMI ABV UP PARAM F/U: HCPCS | Performed by: FAMILY MEDICINE

## 2022-09-22 PROCEDURE — 3017F COLORECTAL CA SCREEN DOC REV: CPT | Performed by: FAMILY MEDICINE

## 2022-09-22 PROCEDURE — 3044F HG A1C LEVEL LT 7.0%: CPT | Performed by: FAMILY MEDICINE

## 2022-09-22 PROCEDURE — 1123F ACP DISCUSS/DSCN MKR DOCD: CPT | Performed by: FAMILY MEDICINE

## 2022-09-22 PROCEDURE — 2022F DILAT RTA XM EVC RTNOPTHY: CPT | Performed by: FAMILY MEDICINE

## 2022-09-22 RX ORDER — HYDROXYZINE HYDROCHLORIDE 25 MG/1
25 TABLET, FILM COATED ORAL 3 TIMES DAILY PRN
COMMUNITY

## 2022-09-22 SDOH — ECONOMIC STABILITY: TRANSPORTATION INSECURITY
IN THE PAST 12 MONTHS, HAS LACK OF TRANSPORTATION KEPT YOU FROM MEETINGS, WORK, OR FROM GETTING THINGS NEEDED FOR DAILY LIVING?: NO

## 2022-09-22 SDOH — ECONOMIC STABILITY: FOOD INSECURITY: WITHIN THE PAST 12 MONTHS, THE FOOD YOU BOUGHT JUST DIDN'T LAST AND YOU DIDN'T HAVE MONEY TO GET MORE.: NEVER TRUE

## 2022-09-22 SDOH — ECONOMIC STABILITY: FOOD INSECURITY: WITHIN THE PAST 12 MONTHS, YOU WORRIED THAT YOUR FOOD WOULD RUN OUT BEFORE YOU GOT MONEY TO BUY MORE.: NEVER TRUE

## 2022-09-22 SDOH — ECONOMIC STABILITY: TRANSPORTATION INSECURITY
IN THE PAST 12 MONTHS, HAS THE LACK OF TRANSPORTATION KEPT YOU FROM MEDICAL APPOINTMENTS OR FROM GETTING MEDICATIONS?: NO

## 2022-09-22 ASSESSMENT — ENCOUNTER SYMPTOMS
VOMITING: 0
NAUSEA: 0
ABDOMINAL PAIN: 0
DIARRHEA: 0
BLOOD IN STOOL: 0
SHORTNESS OF BREATH: 0
CONSTIPATION: 0
CHEST TIGHTNESS: 0
COUGH: 0
ABDOMINAL DISTENTION: 0

## 2022-09-22 ASSESSMENT — PATIENT HEALTH QUESTIONNAIRE - PHQ9
SUM OF ALL RESPONSES TO PHQ QUESTIONS 1-9: 9
2. FEELING DOWN, DEPRESSED OR HOPELESS: 3
4. FEELING TIRED OR HAVING LITTLE ENERGY: 1
6. FEELING BAD ABOUT YOURSELF - OR THAT YOU ARE A FAILURE OR HAVE LET YOURSELF OR YOUR FAMILY DOWN: 0
9. THOUGHTS THAT YOU WOULD BE BETTER OFF DEAD, OR OF HURTING YOURSELF: 0
SUM OF ALL RESPONSES TO PHQ QUESTIONS 1-9: 9
1. LITTLE INTEREST OR PLEASURE IN DOING THINGS: 1
SUM OF ALL RESPONSES TO PHQ QUESTIONS 1-9: 9
7. TROUBLE CONCENTRATING ON THINGS, SUCH AS READING THE NEWSPAPER OR WATCHING TELEVISION: 1
SUM OF ALL RESPONSES TO PHQ9 QUESTIONS 1 & 2: 4
3. TROUBLE FALLING OR STAYING ASLEEP: 2
SUM OF ALL RESPONSES TO PHQ QUESTIONS 1-9: 9
10. IF YOU CHECKED OFF ANY PROBLEMS, HOW DIFFICULT HAVE THESE PROBLEMS MADE IT FOR YOU TO DO YOUR WORK, TAKE CARE OF THINGS AT HOME, OR GET ALONG WITH OTHER PEOPLE: 1
8. MOVING OR SPEAKING SO SLOWLY THAT OTHER PEOPLE COULD HAVE NOTICED. OR THE OPPOSITE, BEING SO FIGETY OR RESTLESS THAT YOU HAVE BEEN MOVING AROUND A LOT MORE THAN USUAL: 1
5. POOR APPETITE OR OVEREATING: 0

## 2022-09-22 ASSESSMENT — SOCIAL DETERMINANTS OF HEALTH (SDOH): HOW HARD IS IT FOR YOU TO PAY FOR THE VERY BASICS LIKE FOOD, HOUSING, MEDICAL CARE, AND HEATING?: NOT HARD AT ALL

## 2022-09-22 NOTE — PROGRESS NOTES
Subjective:      Patient ID: Nam Neal is a 79 y.o. male. Chief Complaint   Patient presents with    Check-Up     Hypertension, lipids, thyroid        Patient presents with:  Check-Up: Hypertension, lipids, thyroid    He is well and no c/o  He still see dr Eugenio Beckett updated and reviewed   He stay busy   He recently did some [employer] work and no cp no sob    YOB: 1955    Date of Visit:  9/22/2022     -- Codeine -- Nausea Only   -- Nsaids     --  Kidney issues    Current Outpatient Medications:  hydrOXYzine HCl (ATARAX) 25 MG tablet, Take 25 mg by mouth 3 times daily as needed for Itching, Disp: , Rfl:   simvastatin (ZOCOR) 40 MG tablet, TAKE ONE TABLET BY MOUTH ONCE NIGHTLY, Disp: 90 tablet, Rfl: 2  losartan (COZAAR) 50 MG tablet, TAKE ONE TABLET BY MOUTH DAILY, Disp: 90 tablet, Rfl: 2  levothyroxine (SYNTHROID) 175 MCG tablet, TAKE ONE TABLET BY MOUTH DAILY, Disp: 90 tablet, Rfl: 2  vitamin D3 (CHOLECALCIFEROL) 10 MCG (400 UNIT) TABS tablet, Take 1 tablet by mouth daily, Disp: , Rfl:   Probiotic Product (ALIGN PO), Take by mouth, Disp: , Rfl:   fluvoxaMINE (LUVOX) 100 MG tablet, nightly , Disp: , Rfl:   pantoprazole (PROTONIX) 20 MG tablet, Take 40 mg by mouth daily , Disp: , Rfl:   busPIRone (BUSPAR) 10 MG tablet, Take 1 tablet by mouth 2 times daily. (Patient taking differently: 20 mg nightly 3 tablets at night), Disp: 60 tablet, Rfl: 4  desvenlafaxine succinate (PRISTIQ) 50 MG TB24 extended release tablet, Take 50 mg by mouth daily.   , Disp: , Rfl:   ONE TOUCH ULTRA TEST strip, TEST ONCE DAILY, Disp: 30 strip, Rfl: 6  ONETOUCH DELICA LANCETS MISC, USE AS DIRECTED, Disp: 4 each, Rfl: 6    No current facility-administered medications for this visit.      ---------------------------               09/22/22                      1503         ---------------------------   BP:          128/78         Site:    Left Upper Arm     Position:     Sitting        Cuff Size:   Large Adult      Pulse:         60           Temp:   97.3 °F (36.3 °C)   TempSrc:    Temporal        Weight: 198 lb (89.8 kg)    Height:   5' 3\" (1.6 m)    ---------------------------  Body mass index is 35.07 kg/m². Wt Readings from Last 3 Encounters:  09/22/22 : 198 lb (89.8 kg)  07/18/22 : 195 lb (88.5 kg)  04/21/22 : 196 lb (88.9 kg)    BP Readings from Last 3 Encounters:  09/22/22 : 128/78  07/18/22 : 122/80  04/21/22 : 132/78            Review of Systems   Constitutional:  Negative for appetite change, chills, fever and unexpected weight change. Respiratory:  Negative for cough, chest tightness and shortness of breath. Cardiovascular:  Negative for chest pain, palpitations and leg swelling. Gastrointestinal:  Negative for abdominal distention, abdominal pain, blood in stool, constipation, diarrhea, nausea and vomiting. Genitourinary:  Negative for difficulty urinating, dysuria and hematuria. Neurological:  Positive for headaches. Negative for dizziness. Noting recent headache for 2-4 week  Seems to be stress related. But can get them other times as well  Before this would seldom get  Daily  Exertion will feel better           Objective:   Physical Exam  Constitutional:       General: He is not in acute distress. Appearance: Normal appearance. He is well-developed. He is not ill-appearing or diaphoretic. HENT:      Head: Normocephalic and atraumatic. Comments: No pain to palpate the head no cords   Neck:      Thyroid: No thyroid mass or thyromegaly. Cardiovascular:      Rate and Rhythm: Normal rate and regular rhythm. Heart sounds: Normal heart sounds. No murmur heard. No friction rub. No gallop. Pulmonary:      Effort: Pulmonary effort is normal. No tachypnea, accessory muscle usage or respiratory distress. Breath sounds: Normal breath sounds. No decreased breath sounds, wheezing, rhonchi or rales.    Chest:   Breasts:     Right: No supraclavicular adenopathy. Left: No supraclavicular adenopathy. Musculoskeletal:      Cervical back: Full passive range of motion without pain and neck supple. Lymphadenopathy:      Cervical: No cervical adenopathy. Upper Body:      Right upper body: No supraclavicular adenopathy. Left upper body: No supraclavicular adenopathy. Skin:     General: Skin is warm and dry. Coloration: Skin is not pale. Neurological:      General: No focal deficit present. Mental Status: He is alert. Assessment:       Diagnosis Orders   1. Type 2 diabetes mellitus without complication, without long-term current use of insulin ([facility])  Comprehensive Metabolic Panel    Hemoglobin A1C      2. Primary hypertension  Comprehensive Metabolic Panel      3.  New daily persistent headache  CT HEAD WO CONTRAST          Medicines were reconciled today   Cardiology visit done on 8/22/22 reviewed stable   Dermatology visit ok on 4/28/22  Psychiatry visit on 4/27/22      Plan:      Continue the medicines  Stay with the diet  See us in 5 months         Douglas Genao MD

## 2022-09-29 ENCOUNTER — HOSPITAL ENCOUNTER (OUTPATIENT)
Dept: CT IMAGING | Age: 67
Discharge: HOME | End: 2022-09-29
Payer: MEDICARE

## 2022-09-29 DIAGNOSIS — G44.52 NEW DAILY PERSISTENT HEADACHE: ICD-10-CM

## 2022-09-29 PROCEDURE — 70450 CT HEAD/BRAIN W/O DYE: CPT

## 2022-11-17 ENCOUNTER — NURSE ONLY (OUTPATIENT)
Dept: FAMILY MEDICINE CLINIC | Age: 67
End: 2022-11-17
Payer: MEDICARE

## 2022-11-17 DIAGNOSIS — Z23 NEEDS FLU SHOT: Primary | ICD-10-CM

## 2022-11-17 PROCEDURE — 90694 VACC AIIV4 NO PRSRV 0.5ML IM: CPT | Performed by: FAMILY MEDICINE

## 2022-11-17 PROCEDURE — G0008 ADMIN INFLUENZA VIRUS VAC: HCPCS | Performed by: FAMILY MEDICINE

## 2022-11-17 NOTE — PROGRESS NOTES
Vaccine Information Sheet, \"Influenza - Inactivated\"  given to Spencer Winston, or parent/legal guardian of  Spencer Winston and verbalized understanding. Patient responses:    Have you ever had a reaction to a flu vaccine? No  Do you have any current illness? No  Have you ever had Guillian [place]vel[place] Syndrome? No  Do you have a serious allergy to any of the follow: Neomycin, Polymyxin, Thimerosal, eggs or egg products? No    Flu vaccine given per order. Please see immunization tab. Risks and benefits explained. Current VIS given.

## 2023-01-02 RX ORDER — SIMVASTATIN 40 MG
TABLET ORAL
Qty: 90 TABLET | Refills: 2 | Status: SHIPPED | OUTPATIENT
Start: 2023-01-02

## 2023-01-23 RX ORDER — LOSARTAN POTASSIUM 50 MG/1
TABLET ORAL
Qty: 90 TABLET | Refills: 2 | Status: SHIPPED | OUTPATIENT
Start: 2023-01-23 | End: 2023-02-17 | Stop reason: SDUPTHER

## 2023-02-04 ENCOUNTER — OFFICE VISIT (OUTPATIENT)
Dept: URGENT CARE | Age: 68
End: 2023-02-04

## 2023-02-04 VITALS
DIASTOLIC BLOOD PRESSURE: 71 MMHG | SYSTOLIC BLOOD PRESSURE: 151 MMHG | HEIGHT: 65 IN | WEIGHT: 195 LBS | BODY MASS INDEX: 32.49 KG/M2 | TEMPERATURE: 97.6 F | OXYGEN SATURATION: 96 % | HEART RATE: 56 BPM | RESPIRATION RATE: 12 BRPM

## 2023-02-04 DIAGNOSIS — I10 PRIMARY HYPERTENSION: ICD-10-CM

## 2023-02-04 DIAGNOSIS — H66.003 NON-RECURRENT ACUTE SUPPURATIVE OTITIS MEDIA OF BOTH EARS WITHOUT SPONTANEOUS RUPTURE OF TYMPANIC MEMBRANES: Primary | ICD-10-CM

## 2023-02-04 RX ORDER — AMOXICILLIN AND CLAVULANATE POTASSIUM 875; 125 MG/1; MG/1
1 TABLET, FILM COATED ORAL 2 TIMES DAILY
Qty: 20 TABLET | Refills: 0 | Status: SHIPPED | OUTPATIENT
Start: 2023-02-04 | End: 2023-02-14

## 2023-02-04 ASSESSMENT — ENCOUNTER SYMPTOMS
NAUSEA: 0
VOMITING: 0
SINUS PRESSURE: 0

## 2023-02-04 NOTE — PATIENT INSTRUCTIONS
Augmentin twice daily for 10 days  Tylenol or Ibuprofen for pain  Follow up with regular doctor in one week for ear check  Your blood pressure was elevated today be sure to take your medications as directed, monitor blood pressure at home and follow a low salt cardiac diet. Follow up with regular physician for management.

## 2023-02-04 NOTE — PROGRESS NOTES
Modesta Lilly (:  1955) is a 79 y.o. male,New patient, here for evaluation of the following chief complaint(s):  Ear Fullness (Pt presents with bilateral ear fullness for 1 week. )      ASSESSMENT/PLAN:  1. Non-recurrent acute suppurative otitis media of both ears without spontaneous rupture of tympanic membranes  Augmentin twice daily for 10 days  Tylenol or Ibuprofen for pain  Follow up with regular doctor in one week for ear check  - amoxicillin-clavulanate (AUGMENTIN) 875-125 MG per tablet; Take 1 tablet by mouth 2 times daily for 10 days  Dispense: 20 tablet; Refill: 0    2. Primary hypertension  Encouraged patient to monitor blood pressure and follow up with PCP. DASH Diet and Lifestyle changes discussed  Reports compliance with medications    Return if symptoms worsen or fail to improve. SUBJECTIVE/OBJECTIVE:  HPI    Vitals:    23 1819 23 1848   BP: (!) 145/80 (!) 151/71   Site: Right Upper Arm    Position: Sitting    Cuff Size: Medium Adult    Pulse: 56    Resp: 12    Temp: 97.6 °F (36.4 °C)    TempSrc: Oral    SpO2: 96%    Weight: 195 lb (88.5 kg)    Height: 5' 5\" (1.651 m)        Review of Systems   Constitutional:  Negative for fever. HENT:  Positive for ear discharge, ear pain and hearing loss. Negative for congestion, postnasal drip and sinus pressure. Gastrointestinal:  Negative for nausea and vomiting. Physical Exam  Vitals reviewed. Constitutional:       Appearance: Normal appearance. HENT:      Head: Normocephalic and atraumatic. Right Ear: Ear canal and external ear normal. A middle ear effusion (purulent) is present. Tympanic membrane is erythematous and bulging. Left Ear: Ear canal and external ear normal. A middle ear effusion (purulent) is present. Tympanic membrane is erythematous. Nose: Nose normal. No congestion or rhinorrhea. Mouth/Throat:      Mouth: Mucous membranes are moist.      Pharynx: Oropharynx is clear.  No oropharyngeal exudate or posterior oropharyngeal erythema. Eyes:      Conjunctiva/sclera: Conjunctivae normal.      Pupils: Pupils are equal, round, and reactive to light. Cardiovascular:      Rate and Rhythm: Normal rate and regular rhythm. Pulses: Normal pulses. Heart sounds: Normal heart sounds. No murmur heard. No friction rub. No gallop. Pulmonary:      Effort: Pulmonary effort is normal.      Breath sounds: Normal breath sounds. Skin:     General: Skin is warm and dry. Capillary Refill: Capillary refill takes less than 2 seconds. Neurological:      Mental Status: He is alert and oriented to person, place, and time. An electronic signature was used to authenticate this note.     --Troy Avina, TOBY - CNP

## 2023-02-10 ENCOUNTER — OFFICE VISIT (OUTPATIENT)
Dept: URGENT CARE | Age: 68
End: 2023-02-10

## 2023-02-10 VITALS
HEART RATE: 47 BPM | TEMPERATURE: 98.6 F | WEIGHT: 203 LBS | BODY MASS INDEX: 35.97 KG/M2 | SYSTOLIC BLOOD PRESSURE: 153 MMHG | DIASTOLIC BLOOD PRESSURE: 69 MMHG | HEIGHT: 63 IN | OXYGEN SATURATION: 96 %

## 2023-02-10 DIAGNOSIS — I10 PRIMARY HYPERTENSION: ICD-10-CM

## 2023-02-10 DIAGNOSIS — H66.003 NON-RECURRENT ACUTE SUPPURATIVE OTITIS MEDIA OF BOTH EARS WITHOUT SPONTANEOUS RUPTURE OF TYMPANIC MEMBRANES: Primary | ICD-10-CM

## 2023-02-10 RX ORDER — CEFDINIR 300 MG/1
300 CAPSULE ORAL 2 TIMES DAILY
Qty: 14 CAPSULE | Refills: 0 | Status: SHIPPED | OUTPATIENT
Start: 2023-02-10 | End: 2023-02-17

## 2023-02-10 NOTE — PATIENT INSTRUCTIONS
Stop Augmentin  Start Cefdinir one tablet twice daily  [facility] and check blood pressure.

## 2023-02-15 ENCOUNTER — TELEPHONE (OUTPATIENT)
Dept: FAMILY MEDICINE CLINIC | Age: 68
End: 2023-02-15

## 2023-02-15 NOTE — TELEPHONE ENCOUNTER
----- Message from Chance Jean-Baptiste sent at 2/15/2023  2:35 PM EST -----  Subject: Message to Provider    QUESTIONS  Information for Provider? Pt called and stated that he wanted to let Dr. Ronaldo Cross know that he had to switch providers due to him finding a provider   closer to his home. Pt states that he thinks Dr. Ronaldo Cross is a great DrJuany and   would not of changed if it wasn't for the distance. Pt states that he   would like to talk to Dr. Ronaldo Cross himself if at all possible.   ---------------------------------------------------------------------------  --------------  [email or web address]  [phone]; OK to leave message on voicemail  ---------------------------------------------------------------------------  --------------  SCRIPT ANSWERS  Relationship to Patient?  Self

## 2023-02-17 ENCOUNTER — OFFICE VISIT (OUTPATIENT)
Dept: FAMILY MEDICINE CLINIC | Age: 68
End: 2023-02-17
Payer: MEDICARE

## 2023-02-17 VITALS
SYSTOLIC BLOOD PRESSURE: 157 MMHG | BODY MASS INDEX: 35.79 KG/M2 | DIASTOLIC BLOOD PRESSURE: 71 MMHG | WEIGHT: 202 LBS | OXYGEN SATURATION: 97 % | HEIGHT: 63 IN | HEART RATE: 46 BPM | TEMPERATURE: 97.1 F

## 2023-02-17 DIAGNOSIS — F31.9 BIPOLAR AFFECTIVE DISORDER, REMISSION STATUS UNSPECIFIED (HCC): ICD-10-CM

## 2023-02-17 DIAGNOSIS — Z87.898 HISTORY OF PREDIABETES: ICD-10-CM

## 2023-02-17 DIAGNOSIS — H91.93 DECREASED HEARING OF BOTH EARS: ICD-10-CM

## 2023-02-17 DIAGNOSIS — E03.9 ACQUIRED HYPOTHYROIDISM: ICD-10-CM

## 2023-02-17 DIAGNOSIS — E78.2 MIXED HYPERLIPIDEMIA: ICD-10-CM

## 2023-02-17 DIAGNOSIS — I10 PRIMARY HYPERTENSION: ICD-10-CM

## 2023-02-17 DIAGNOSIS — R94.8 ABNORMAL RESULTS OF FUNCTION STUDIES OF OTHER ORGANS AND SYSTEMS: ICD-10-CM

## 2023-02-17 DIAGNOSIS — Z12.5 PROSTATE CANCER SCREENING: ICD-10-CM

## 2023-02-17 DIAGNOSIS — Z00.00 ROUTINE GENERAL MEDICAL EXAMINATION AT A HEALTH CARE FACILITY: ICD-10-CM

## 2023-02-17 DIAGNOSIS — Z76.89 ENCOUNTER TO ESTABLISH CARE: Primary | ICD-10-CM

## 2023-02-17 DIAGNOSIS — E66.01 SEVERE OBESITY (BMI 35.0-39.9) WITH COMORBIDITY (HCC): ICD-10-CM

## 2023-02-17 LAB
PROSTATE SPECIFIC ANTIGEN: 2.01 NG/ML (ref 0–4)
T4 FREE: 1.8 NG/DL (ref 0.9–1.8)
TSH REFLEX: 0.17 UIU/ML (ref 0.27–4.2)

## 2023-02-17 PROCEDURE — 99204 OFFICE O/P NEW MOD 45 MIN: CPT | Performed by: STUDENT IN AN ORGANIZED HEALTH CARE EDUCATION/TRAINING PROGRAM

## 2023-02-17 PROCEDURE — G8484 FLU IMMUNIZE NO ADMIN: HCPCS | Performed by: STUDENT IN AN ORGANIZED HEALTH CARE EDUCATION/TRAINING PROGRAM

## 2023-02-17 PROCEDURE — 3078F DIAST BP <80 MM HG: CPT | Performed by: STUDENT IN AN ORGANIZED HEALTH CARE EDUCATION/TRAINING PROGRAM

## 2023-02-17 PROCEDURE — 1036F TOBACCO NON-USER: CPT | Performed by: STUDENT IN AN ORGANIZED HEALTH CARE EDUCATION/TRAINING PROGRAM

## 2023-02-17 PROCEDURE — 3074F SYST BP LT 130 MM HG: CPT | Performed by: STUDENT IN AN ORGANIZED HEALTH CARE EDUCATION/TRAINING PROGRAM

## 2023-02-17 PROCEDURE — G8417 CALC BMI ABV UP PARAM F/U: HCPCS | Performed by: STUDENT IN AN ORGANIZED HEALTH CARE EDUCATION/TRAINING PROGRAM

## 2023-02-17 PROCEDURE — G8427 DOCREV CUR MEDS BY ELIG CLIN: HCPCS | Performed by: STUDENT IN AN ORGANIZED HEALTH CARE EDUCATION/TRAINING PROGRAM

## 2023-02-17 PROCEDURE — 3017F COLORECTAL CA SCREEN DOC REV: CPT | Performed by: STUDENT IN AN ORGANIZED HEALTH CARE EDUCATION/TRAINING PROGRAM

## 2023-02-17 PROCEDURE — 1123F ACP DISCUSS/DSCN MKR DOCD: CPT | Performed by: STUDENT IN AN ORGANIZED HEALTH CARE EDUCATION/TRAINING PROGRAM

## 2023-02-17 RX ORDER — LOSARTAN POTASSIUM 50 MG/1
TABLET ORAL
Qty: 90 TABLET | Refills: 1 | Status: SHIPPED | OUTPATIENT
Start: 2023-02-17

## 2023-02-17 RX ORDER — LEVOTHYROXINE SODIUM 175 UG/1
TABLET ORAL
Qty: 90 TABLET | Refills: 1 | Status: SHIPPED | OUTPATIENT
Start: 2023-02-17 | End: 2023-02-20

## 2023-02-17 SDOH — ECONOMIC STABILITY: HOUSING INSECURITY
IN THE LAST 12 MONTHS, WAS THERE A TIME WHEN YOU DID NOT HAVE A STEADY PLACE TO SLEEP OR SLEPT IN A SHELTER (INCLUDING NOW)?: NO

## 2023-02-17 SDOH — ECONOMIC STABILITY: INCOME INSECURITY: HOW HARD IS IT FOR YOU TO PAY FOR THE VERY BASICS LIKE FOOD, HOUSING, MEDICAL CARE, AND HEATING?: NOT HARD AT ALL

## 2023-02-17 SDOH — ECONOMIC STABILITY: FOOD INSECURITY: WITHIN THE PAST 12 MONTHS, YOU WORRIED THAT YOUR FOOD WOULD RUN OUT BEFORE YOU GOT MONEY TO BUY MORE.: NEVER TRUE

## 2023-02-17 SDOH — ECONOMIC STABILITY: FOOD INSECURITY: WITHIN THE PAST 12 MONTHS, THE FOOD YOU BOUGHT JUST DIDN'T LAST AND YOU DIDN'T HAVE MONEY TO GET MORE.: NEVER TRUE

## 2023-02-17 ASSESSMENT — PATIENT HEALTH QUESTIONNAIRE - PHQ9
1. LITTLE INTEREST OR PLEASURE IN DOING THINGS: 0
9. THOUGHTS THAT YOU WOULD BE BETTER OFF DEAD, OR OF HURTING YOURSELF: 0
10. IF YOU CHECKED OFF ANY PROBLEMS, HOW DIFFICULT HAVE THESE PROBLEMS MADE IT FOR YOU TO DO YOUR WORK, TAKE CARE OF THINGS AT HOME, OR GET ALONG WITH OTHER PEOPLE: 0
SUM OF ALL RESPONSES TO PHQ QUESTIONS 1-9: 0
3. TROUBLE FALLING OR STAYING ASLEEP: 0
SUM OF ALL RESPONSES TO PHQ QUESTIONS 1-9: 0
5. POOR APPETITE OR OVEREATING: 0
4. FEELING TIRED OR HAVING LITTLE ENERGY: 0
SUM OF ALL RESPONSES TO PHQ9 QUESTIONS 1 & 2: 0
8. MOVING OR SPEAKING SO SLOWLY THAT OTHER PEOPLE COULD HAVE NOTICED. OR THE OPPOSITE, BEING SO FIGETY OR RESTLESS THAT YOU HAVE BEEN MOVING AROUND A LOT MORE THAN USUAL: 0
SUM OF ALL RESPONSES TO PHQ QUESTIONS 1-9: 0
6. FEELING BAD ABOUT YOURSELF - OR THAT YOU ARE A FAILURE OR HAVE LET YOURSELF OR YOUR FAMILY DOWN: 0
2. FEELING DOWN, DEPRESSED OR HOPELESS: 0
7. TROUBLE CONCENTRATING ON THINGS, SUCH AS READING THE NEWSPAPER OR WATCHING TELEVISION: 0
SUM OF ALL RESPONSES TO PHQ QUESTIONS 1-9: 0

## 2023-02-17 ASSESSMENT — ENCOUNTER SYMPTOMS
COUGH: 1
TROUBLE SWALLOWING: 0
SHORTNESS OF BREATH: 0
SINUS PAIN: 0
VOMITING: 0
BACK PAIN: 0
NAUSEA: 0
ABDOMINAL PAIN: 0
DIARRHEA: 0
CONSTIPATION: 0

## 2023-02-17 NOTE — PATIENT INSTRUCTIONS
- Complete labs  - Follow up in 1 months   - See Nephrology   -Recommend 150 minutes of cardiovascular activity a week, or 10,000 to 15,000 steps a day, 2 days of weightbearing  -Encourage healthy diet,avoid processed/refined carbohydrates

## 2023-02-17 NOTE — PROGRESS NOTES
Spencer Winston  YOB: 1955    Date of Service:  2/17/2023    Chief Complaint:   Spencer Winston is a 79 y.o. male who presents for complete physical examination. Concerns:   Chief Complaint   Patient presents with    New Patient     HPI:  Patient is a 59-year-old male with history of nephritis, sleep apnea, osteoarthritis, diabetes, hyperlipidemia, HTN and hypothyroidism. Changing physicians due to distance. Previously seen by Dr. Lara Shirley. Otitis media   Endorses clogged ears. 2 weeks ago and last Friday went to urgent care for clogged ear, was given Cefdinir 300mg bid for 7 days, was taking Augmentin. Was diagnosed with non-recurrent acute suppurative otitis media of both ears without spontaneous rupture of tympanic membranes  Continues to have clogged sensation. History of sleep apnea: Did not tolerate CPAP, sleep is fine. PTSD, OCD, Bipolar: Sees psychiatrist Dr. Chapis Guevara and psychologist. Patient takes fluvoxamine 100 mg at night and BuSpar 10mg (1 AM , 3 nightly ), and desvenlafaxine succinate Pristiq 50 mg daily. Hyperlipidemia: Patient takes simvastatin 40 mg daily  Hypertension: Patient states that losartan 50 mg daily. Jamshid Medin GERD patient takes Protonix 40 mg daily. Diabetes is well controlled last A1c 6.3, 11/16/2022. Hypothyroidism: Takes levothyroxine 175 MCG tablet, last TSH 0.85 on 4/21/2022. Patient also sees a kidney specialist at [facility].    Allergies: codeine, NSAIDs      Social History  Occupation: Retired, .   Lives with: wife , 1 dog   Smoker: Never   Alcohol use: Never   Drug use: Never   Diet: Balanced, boxed lunches , turkey gaytan, milk , cinnamon apple sauce,   Exercise: walking daily,   Sexual activity: none   Last eye exam: last year ,   Last dentist exam: Last year     Health Maintenance    Colonoscopy-8/24/2021, no polyp  Hepatitis C screening - NR 10/14/2015   HIV screening - NR 11/04/2015   COVID-19 vaccine - Moderna 2 doses   Flu vaccine - last year   Last Tdap > 7/12/2022        Past Medical History:   Diagnosis Date    Allergic rhinitis     Anxiety     Arthritis     Bipolar Disorder     Diabetes mellitus ([facility] [address]     Prediabetic    GERD (gastroesophageal reflux disease)     Hearing loss     HYPERCHOLESTERAEMIA     Hypertension     Hypothyroid     Kidney disease     Nephritis     OCD (obsessive compulsive disorder)     PTSD     Screening PSA (prostate specific antigen) 1.3.11    result - 2.91    Sleep apnea     does not use cpap     Family History   Problem Relation Age of Onset    Heart Disease Mother     Heart Disease Father     Lung Cancer Sister     Arthritis Other        Health Maintenance   Topic Date Due    Shingles vaccine (2 of 3) 06/28/2011    Diabetic foot exam  12/28/2021    Diabetic Alb to Cr ratio (uACR) test  02/19/2022    COVID-19 Vaccine (4 - Booster for Moderna series) 03/03/2022    GFR test (Diabetes, CKD 3-4, OR last GFR 15-59)  07/29/2022    Annual Wellness Visit (AWV)  08/06/2022    Diabetic retinal exam  03/24/2023    Lipids  04/21/2023    A1C test (Diabetic or Prediabetic)  11/16/2023    Depression Monitoring  02/17/2024    Colorectal Cancer Screen  08/24/2031    DTaP/Tdap/Td vaccine (3 - Td or Tdap) 07/12/2032    Flu vaccine  Completed    Pneumococcal 65+ years Vaccine  Completed    Hepatitis C screen  Completed    Hepatitis A vaccine  Aged Out    Hib vaccine  Aged Out    Meningococcal (ACWY) vaccine  Aged Out     Immunization History   Administered Date(s) Administered    COVID-19, MODERNA BLUE border, Primary or Immunocompromised, (age 12y+), IM, 100 mcg/0.5mL 03/01/2021, 03/29/2021, 01/06/2022    Influenza, FLUAD, (age 72 y+), Adjuvanted, 0.5mL 11/04/2020, 12/10/2021, 11/17/2022    Pneumococcal Polysaccharide ([id number]) 07/27/2020    Td, unspecified formulation 06/26/2006    Tdap (Boostrix, Adacel) 07/27/2017, 07/12/2022    Zoster Live (Zostavax) 05/03/2011       Allergies   Allergen Reactions    Codeine Nausea Only    Nsaids      Kidney issues     Outpatient Medications Marked as Taking for the 23 encounter (Office Visit) with Aicha Bolton MD   Medication Sig Dispense Refill    losartan (COZAAR) 50 MG tablet TAKE ONE TABLET BY MOUTH DAILY 90 tablet 1    [DISCONTINUED] levothyroxine (SYNTHROID) 175 MCG tablet TAKE ONE TABLET BY MOUTH DAILY 90 tablet 1    [] cefdinir (OMNICEF) 300 MG capsule Take 1 capsule by mouth 2 times daily for 7 days 14 capsule 0    simvastatin (ZOCOR) 40 MG tablet TAKE ONE TABLET BY MOUTH ONCE NIGHTLY 90 tablet 2    [DISCONTINUED] hydrOXYzine HCl (ATARAX) 25 MG tablet Take 25 mg by mouth 3 times daily as needed for Itching      vitamin D3 (CHOLECALCIFEROL) 10 MCG (400 UNIT) TABS tablet Take 1 tablet by mouth daily      Probiotic Product (ALIGN PO) Take by mouth      fluvoxaMINE (LUVOX) 100 MG tablet nightly       pantoprazole (PROTONIX) 20 MG tablet Take 40 mg by mouth daily       ONE TOUCH ULTRA TEST strip TEST ONCE DAILY 30 strip 6    ONETOUCH DELICA LANCETS MISC USE AS DIRECTED 4 each 6    busPIRone (BUSPAR) 10 MG tablet Take 1 tablet by mouth 2 times daily. (Patient taking differently: 20 mg nightly 3 tablets at night) 60 tablet 4    desvenlafaxine succinate (PRISTIQ) 50 MG TB24 extended release tablet Take 50 mg by mouth daily.             Past Surgical History:   Procedure Laterality Date    COLONOSCOPY  05    normal, Dr. Law    COLONOSCOPY  14    normal, rhoades    EXTERNAL AUDITORY CANAL RECONSTRUCTION      left  and     LYMPH NODE BIOPSY  2010    removed groin, benign, dr ray    MOUTH SURGERY      gum surgery     TONSILLECTOMY      TOTAL HIP ARTHROPLASTY Right 9/15/2020    RIGHT DIRECT ANTERIOR APPROACH TOTAL HIP ARTHROPLASTY performed by Carlos Copeland MD at [facility] OR    TOTAL HIP ARTHROPLASTY Left 2021    DIRECT ANTERIOR APPROACH LEFT TOTAL HIP ARTHROPLASTY performed by Carlos Copeland MD at [facility] OR    UPPER GASTROINTESTINAL ENDOSCOPY  5/16/14    Emmanuel moore, check in one year    UPPER GASTROINTESTINAL ENDOSCOPY  6/1/15    Emmanuel Thorpe, check one year    UPPER GASTROINTESTINAL ENDOSCOPY  06/13/2018    oscar Thorpe, check in 3 years       Social History     Socioeconomic History    Marital status:      Spouse name: Not on file    Number of children: Not on file    Years of education: Not on file    Highest education level: Not on file   Occupational History    Occupation: Retired   Tobacco Use    Smoking status: Never    Smokeless tobacco: Never   Vaping Use    Vaping Use: Never used   Substance and Sexual Activity    Alcohol use: No     Alcohol/week: 0.0 standard drinks    Drug use: No    Sexual activity: Not on file   Other Topics Concern    Not on file   Social History Narrative    Not on file     Social Determinants of Health     Financial Resource Strain: Low Risk     Difficulty of Paying Living Expenses: Not hard at all   Food Insecurity: No Food Insecurity    Worried About Running Out of Food in the Last Year: Never true    [address] in the Last Year: Never true   Transportation Needs: No Transportation Needs    Lack of Transportation (Medical): No    Lack of Transportation (Non-Medical): No   Physical Activity: Not on file   Stress: Not on file   Social Connections: Not on file   Intimate Partner Violence: Not on file   Housing Stability: Unknown    Unable to Pay for Housing in the Last Year: Not on file    Number of Places Lived in the Last Year: Not on file    Unstable Housing in the Last Year: No       Review ofSystems:  Review of Systems   Constitutional:  Negative for diaphoresis, fatigue, fever and unexpected weight change. HENT:  Negative for congestion, sinus pain and trouble swallowing. Eyes:  Negative for visual disturbance. Respiratory:  Positive for cough (sinusitis). Negative for shortness of breath. Cardiovascular:  Negative for chest pain, palpitations and leg swelling.    Gastrointestinal: Negative for abdominal pain, constipation, diarrhea, nausea and vomiting. Genitourinary:  Negative for difficulty urinating and dysuria. Musculoskeletal:  Negative for arthralgias (shoulder), back pain and joint swelling. Skin:  Negative for rash. See dermatologist - keratosis    Neurological:  Positive for headaches. Negative for weakness, light-headedness and numbness. Psychiatric/Behavioral:  Negative for sleep disturbance (8-9) and suicidal ideas. The patient is not nervous/anxious. PhysicalExam:   Vitals:    02/17/23 1413 02/17/23 1418   BP: (!) 149/70 (!) 157/71   Site: Left Upper Arm Right Upper Arm   Position: Sitting Sitting   Cuff Size: Large Adult Large Adult   Pulse: (!) 46    Temp: 97.1 °F (36.2 °C)    SpO2: 97%    Weight: 202 lb (91.6 kg)    Height: 5' 3\" (1.6 m)      Body mass index is 35.78 kg/m². Physical Exam  Constitutional:       General: He is not in acute distress. Appearance: Normal appearance. He is not ill-appearing. HENT:      Head: Normocephalic and atraumatic. Right Ear: External ear normal. Decreased hearing noted. No tenderness. A middle ear effusion is present. There is impacted cerumen. Left Ear: External ear normal.      Nose: Nose normal.      Mouth/Throat:      Mouth: Mucous membranes are moist.      Pharynx: Oropharynx is clear. No posterior oropharyngeal erythema. Eyes:      Extraocular Movements: Extraocular movements intact. Pupils: Pupils are equal, round, and reactive to light. Neck:      Thyroid: No thyroid mass, thyromegaly or thyroid tenderness. Cardiovascular:      Rate and Rhythm: Normal rate and regular rhythm. Pulses: Normal pulses. Heart sounds: Normal heart sounds. No murmur heard. No friction rub. No gallop. Pulmonary:      Effort: Pulmonary effort is normal. No respiratory distress. Breath sounds: Normal breath sounds. No wheezing. Abdominal:      General: Abdomen is flat.  Bowel sounds are normal. There is no distension. Palpations: Abdomen is soft. There is no mass. Tenderness: There is no abdominal tenderness. There is no guarding. Musculoskeletal:         General: No tenderness. Normal range of motion. Cervical back: Normal range of motion. Right lower leg: No edema. Left lower leg: No edema. Lymphadenopathy:      Cervical: No cervical adenopathy. Skin:     General: Skin is warm and dry. Capillary Refill: Capillary refill takes less than 2 seconds. Findings: No rash. Neurological:      General: No focal deficit present. Mental Status: He is alert. Psychiatric:         Mood and Affect: Mood normal.         Thought Content: Thought content normal.       Successful ear lavage: discussed options including risks benefits and alternatives of lavage and pt consented. Counselled on self care and OTC meds. Assessment/Plan:  Encounter to establish care  Routine general medical examination at a health care facility  -Chart/records reviewed, history and physical performed, health maintenance addressed and updated, presenting problems addressed.   -Recommend 150 minutes of cardiovascular activity a week, or 10,000 to 15,000 steps a day, 2 days of weightbearing  -Encourage healthy diet,avoid processed/refined carbohydrates   Health Maintenance Due   Topic Date Due    Shingles vaccine (2 of 3) 06/28/2011    Diabetic foot exam  12/28/2021    Diabetic Alb to Cr ratio (uACR) test  02/19/2022    COVID-19 Vaccine (4 - Booster for Moderna series) 03/03/2022    GFR test (Diabetes, CKD 3-4, OR last GFR 15-59)  07/29/2022    Annual Wellness Visit (AWV)  08/06/2022   Health Maintenance    Colonoscopy-8/24/2021, no polyp  Hepatitis C screening - NR 10/14/2015   HIV screening - NR 11/04/2015   COVID-19 vaccine - Moderna 2 doses   Flu vaccine - last year   Last Tdap > 7/12/2022      Bipolar affective disorder, remission status unspecified (HCC)  PTSD, OCD, Bipolar: Overall mood has been stable. Sees Dr. Steph Combs (psychiatrist) and psychologist.   Graham Fairly fluvoxamine 100 mg at night , BuSpar 10mg (1 AM , 3 nightly ), and desvenlafaxine succinate /Pristiq 50 mg daily,   Acquired hypothyroidism  Controlled, Takes levothyroxine 175 MCG tablet. Last TSH 4/2022 was within normal months. Check TSH  -     TSH with Reflex; Future  Primary hypertension  Not Controlled. BP>140/90. Compliant with medications. Taking losartan 50 mg daily. Discussed adjusting medications. Patient will discuss with his nephrologist.  ? ?Discussed dietary sodium restrictions. Increase dietary efforts and physical activity. Follow-up in 1 month  -     losartan (COZAAR) 50 MG tablet; TAKE ONE TABLET BY MOUTH DAILY, Disp-90 tablet, R-1Normal  Prostate cancer screening  -     PSA, Prostatic Specific Antigen; Future  History of prediabetes  Hemoglobin A1C   Date Value Ref Range Status   11/16/2022 6.3 (H) 4.2 - 5.6 % Final   Does not take any medications for diabetes. Decreased hearing of both ears  Endorses clogged sensation in both ears. Recently treated for otitis media with cefdinir and Augmentin  Ceruminosis is noted right ear. Wax is removed by irrigation    minimal improvement with cerumen removal.    Patient to follow-up with ENT  -     Douglas Wynn MD, Otolaryngology, [facility]  Severe obesity (BMI 35.0-39. 9) with comorbidity (HCC)  BMI 35.78.   - Encouraged healthy diet and exercise      Mixed hyperlipidemia  The 10-year ASCVD risk score (Carolyn DK, et al., 2019) is: 32.9%  Taking Zocor/simvastatin 40 mg daily    Abnormal results of function studies of other organs and systems   -     PSA, Prostatic Specific Antigen; Future       No follow-ups on file. Prior to Visit Medications    Medication Sig Taking?  Authorizing Provider   losartan (COZAAR) 50 MG tablet TAKE ONE TABLET BY MOUTH DAILY Yes Don Raymond MD   simvastatin (ZOCOR) 40 MG tablet TAKE ONE TABLET BY MOUTH ONCE NIGHTLY Yes Payam Richey MD   vitamin D3 (CHOLECALCIFEROL) 10 MCG (400 UNIT) TABS tablet Take 1 tablet by mouth daily Yes Historical Provider, MD   Probiotic Product (ALIGN PO) Take by mouth Yes Historical Provider, MD   fluvoxaMINE (LUVOX) 100 MG tablet nightly  Yes Historical Provider, MD   pantoprazole (PROTONIX) 20 MG tablet Take 40 mg by mouth daily  Yes Historical Provider, MD   [address] [facility] Yes MD Aditya Lam Shone LANCETS MISC USE AS DIRECTED Yes Payam Richey MD   busPIRone (BUSPAR) 10 MG tablet Take 1 tablet by mouth 2 times daily. Patient taking differently: 20 mg nightly 3 tablets at night Yes Payam Richey MD   desvenlafaxine succinate (PRISTIQ) 50 MG TB24 extended release tablet Take 50 mg by mouth daily. Yes Historical Provider, MD   levothyroxine (SYNTHROID) 150 MCG tablet Take 1 tablet by mouth daily  Dionna Stone MD     An electronic signature was used to authenticate this note.     --Dionna Stone MD on 2/17/2023

## 2023-02-20 DIAGNOSIS — E03.9 ACQUIRED HYPOTHYROIDISM: Primary | ICD-10-CM

## 2023-02-20 RX ORDER — LEVOTHYROXINE SODIUM 0.15 MG/1
150 TABLET ORAL DAILY
Qty: 60 TABLET | Refills: 0 | Status: SHIPPED | OUTPATIENT
Start: 2023-02-20

## 2023-02-21 PROBLEM — E11.9 TYPE 2 DIABETES MELLITUS WITHOUT COMPLICATION, WITHOUT LONG-TERM CURRENT USE OF INSULIN (HCC): Status: RESOLVED | Noted: 2019-02-07 | Resolved: 2023-02-21

## 2023-02-24 ENCOUNTER — OFFICE VISIT (OUTPATIENT)
Dept: ENT CLINIC | Age: 68
End: 2023-02-24
Payer: MEDICARE

## 2023-02-24 VITALS — WEIGHT: 200 LBS | RESPIRATION RATE: 16 BRPM | BODY MASS INDEX: 35.44 KG/M2 | TEMPERATURE: 98 F | HEIGHT: 63 IN

## 2023-02-24 DIAGNOSIS — H91.93 BILATERAL HEARING LOSS, UNSPECIFIED HEARING LOSS TYPE: ICD-10-CM

## 2023-02-24 DIAGNOSIS — Z01.10 ENCOUNTER FOR HEARING EXAMINATION, UNSPECIFIED WHETHER ABNORMAL FINDINGS: ICD-10-CM

## 2023-02-24 DIAGNOSIS — H73.002 MYRINGITIS, ACUTE, LEFT: Primary | ICD-10-CM

## 2023-02-24 DIAGNOSIS — H61.21 IMPACTED CERUMEN OF RIGHT EAR: ICD-10-CM

## 2023-02-24 DIAGNOSIS — H91.90 DECREASED HEARING, UNSPECIFIED LATERALITY: Primary | ICD-10-CM

## 2023-02-24 PROCEDURE — 99204 OFFICE O/P NEW MOD 45 MIN: CPT | Performed by: OTOLARYNGOLOGY

## 2023-02-24 PROCEDURE — G8427 DOCREV CUR MEDS BY ELIG CLIN: HCPCS | Performed by: OTOLARYNGOLOGY

## 2023-02-24 PROCEDURE — 3017F COLORECTAL CA SCREEN DOC REV: CPT | Performed by: OTOLARYNGOLOGY

## 2023-02-24 PROCEDURE — 1123F ACP DISCUSS/DSCN MKR DOCD: CPT | Performed by: OTOLARYNGOLOGY

## 2023-02-24 PROCEDURE — 1036F TOBACCO NON-USER: CPT | Performed by: OTOLARYNGOLOGY

## 2023-02-24 PROCEDURE — G8417 CALC BMI ABV UP PARAM F/U: HCPCS | Performed by: OTOLARYNGOLOGY

## 2023-02-24 PROCEDURE — G8484 FLU IMMUNIZE NO ADMIN: HCPCS | Performed by: OTOLARYNGOLOGY

## 2023-02-24 RX ORDER — CIPROFLOXACIN AND DEXAMETHASONE 3; 1 MG/ML; MG/ML
4 SUSPENSION/ DROPS AURICULAR (OTIC) 2 TIMES DAILY
Qty: 1 EACH | Refills: 0 | Status: SHIPPED | OUTPATIENT
Start: 2023-02-24 | End: 2023-03-06

## 2023-02-24 NOTE — PROGRESS NOTES
[address], [address], [facility] [place]  P: [phone]       Patient     Indigo Collazo  1955    ChiefComplaint     Chief Complaint   Patient presents with    New Patient     States that his ears are clogged, but not when he is talking. States that he went to urgent care and was given amoxil. States that he also established with Dr. Joby Merritt. History of Present Illness     Rachell Israel is a 59-year-old male here today for evaluation of bilateral clogged ears. States symptoms are intermittent. Has been given amoxicillin but this did not improve symptoms. History significant for 4 surgeries on the left ear as child states this was secondary to an infection. Denies otalgia, otorrhea, vertigo. Has hearing loss at baseline.     Past Medical History     Past Medical History:   Diagnosis Date    Allergic rhinitis     Anxiety     Arthritis     Bipolar Disorder     Diabetes mellitus ([address]     Prediabetic    GERD (gastroesophageal reflux disease)     Hearing loss     HYPERCHOLESTERAEMIA     Hypertension     Hypothyroid     Kidney disease     Nephritis     OCD (obsessive compulsive disorder)     PTSD     Screening PSA (prostate specific antigen) 1.3.11    result - 2.91    Sleep apnea     does not use cpap       Past Surgical History     Past Surgical History:   Procedure Laterality Date    COLONOSCOPY  4.27.05    normal, Dr. Zulema Oneal    COLONOSCOPY  5/16/14    normal, jf    EXTERNAL AUDITORY CANAL RECONSTRUCTION      left 1980 and 1995    LYMPH NODE BIOPSY  4/27/2010    removed groin, benign, dr Crawford [place]      gum surgery     TONSILLECTOMY      TOTAL HIP ARTHROPLASTY Right 9/15/2020    RIGHT DIRECT ANTERIOR APPROACH TOTAL HIP ARTHROPLASTY performed by Anil Bourne MD at [facility] Left 5/11/2021    DIRECT ANTERIOR APPROACH LEFT TOTAL HIP ARTHROPLASTY performed by Anil Bourne MD at [place] [address] ENDOSCOPY  5/16/14    Duane moore, check in one year    UPPER GASTROINTESTINAL ENDOSCOPY  6/1/15    Duane Zhao, check one year    UPPER GASTROINTESTINAL ENDOSCOPY  06/13/2018    oscar Zhao, check in 3 years       Family History     Family History   Problem Relation Age of Onset    Heart Disease Mother     Heart Disease Father     Lung Cancer Sister     Arthritis Other        Social History     Social History     Tobacco Use    Smoking status: Never    Smokeless tobacco: Never   Vaping Use    Vaping Use: Never used   Substance Use Topics    Alcohol use: No     Alcohol/week: 0.0 standard drinks    Drug use: No        Allergies     Allergies   Allergen Reactions    Codeine Nausea Only    Nsaids      Kidney issues       Medications     Current Outpatient Medications   Medication Sig Dispense Refill    ciprofloxacin-dexamethasone (CIPRODEX) 0.3-0.1 % otic suspension Place 4 drops into the left ear 2 times daily for 10 days 1 each 0    levothyroxine (SYNTHROID) 150 MCG tablet Take 1 tablet by mouth daily 60 tablet 0    losartan (COZAAR) 50 MG tablet TAKE ONE TABLET BY MOUTH DAILY 90 tablet 1    simvastatin (ZOCOR) 40 MG tablet TAKE ONE TABLET BY MOUTH ONCE NIGHTLY 90 tablet 2    vitamin D3 (CHOLECALCIFEROL) 10 MCG (400 UNIT) TABS tablet Take 1 tablet by mouth daily      Probiotic Product (ALIGN PO) Take by mouth      fluvoxaMINE (LUVOX) 100 MG tablet nightly       pantoprazole (PROTONIX) 20 MG tablet Take 40 mg by mouth daily       ONE TOUCH ULTRA TEST strip TEST ONCE DAILY 30 strip 6    ONETOUCH DELICA LANCETS MISC USE AS DIRECTED 4 each 6    busPIRone (BUSPAR) 10 MG tablet Take 1 tablet by mouth 2 times daily. (Patient taking differently: 20 mg nightly 3 tablets at night) 60 tablet 4    desvenlafaxine succinate (PRISTIQ) 50 MG TB24 extended release tablet Take 50 mg by mouth daily. No current facility-administered medications for this visit.        Review of Systems     Review of Systems Constitutional:  Negative for appetite change, chills, fatigue, fever and unexpected weight change. HENT:  Positive for hearing loss. Negative for congestion, ear discharge, ear pain, facial swelling, nosebleeds, postnasal drip, sinus pressure, sneezing, sore throat, tinnitus, trouble swallowing and voice change. Eyes:  Negative for itching. Respiratory:  Negative for apnea, cough and shortness of breath. Endocrine: Negative for cold intolerance and heat intolerance. Musculoskeletal:  Negative for myalgias and neck pain. Skin:  Negative for rash. Allergic/Immunologic: Negative for environmental allergies. Neurological:  Negative for dizziness and headaches. Psychiatric/Behavioral:  Negative for confusion, decreased concentration and sleep disturbance. PhysicalExam     Vitals:    02/24/23 1453   Resp: 16   Temp: 98 °F (36.7 °C)   TempSrc: Infrared   Weight: 200 lb (90.7 kg)   Height: 5' 3\" (1.6 m)       Physical Exam  Constitutional:       General: He is not in acute distress. Appearance: He is well-developed. HENT:      Head: Normocephalic and atraumatic. Right Ear: Tympanic membrane, ear canal and external ear normal. No drainage. No middle ear effusion. There is impacted cerumen (Adherent to TM, unable to remove secondary to patient tolerance). Tympanic membrane is not bulging. Tympanic membrane has normal mobility. Left Ear: Tympanic membrane, ear canal and external ear normal. No drainage. No middle ear effusion. Tympanic membrane is not bulging. Tympanic membrane has normal mobility. Ears:      Comments: Mucosal ovation/granulation tissue covering left TM with purulent otorrhea     Nose: No mucosal edema or rhinorrhea. Mouth/Throat:      Lips: Pink. Mouth: Mucous membranes are moist.      Tongue: No lesions. Palate: No mass. Pharynx: Uvula midline. Eyes:      Pupils: Pupils are equal, round, and reactive to light.    Neck:      Thyroid: No thyroid mass or thyromegaly. Trachea: Trachea and phonation normal.   Cardiovascular:      Pulses: Normal pulses. Pulmonary:      Effort: Pulmonary effort is normal. No accessory muscle usage or respiratory distress. Breath sounds: No stridor. Musculoskeletal:      Cervical back: Full passive range of motion without pain. Lymphadenopathy:      Head:      Right side of head: No submental or submandibular adenopathy. Left side of head: No submental or submandibular adenopathy. Cervical: No cervical adenopathy. Right cervical: No superficial, deep or posterior cervical adenopathy. Left cervical: No superficial, deep or posterior cervical adenopathy. Skin:     General: Skin is warm and dry. Neurological:      Mental Status: He is alert and oriented to person, place, and time. Cranial Nerves: No cranial nerve deficit. Coordination: Coordination normal.      Gait: Gait normal.   Psychiatric:         Thought Content: Thought content normal.       Assessment and Plan     1. Myringitis, acute, left  - ciprofloxacin-dexamethasone (CIPRODEX) 0.3-0.1 % otic suspension; Place 4 drops into the left ear 2 times daily for 10 days  Dispense: 1 each; Refill: 0    2. Impacted cerumen of right ear  -Start hydrogen peroxide      Follow up in 1-2 weeks      Garrett Horvath, DO  2/24/23      Portions of this note were dictated using Dragon.  There may be linguistic errors secondary to the use of this program.

## 2023-02-28 ASSESSMENT — ENCOUNTER SYMPTOMS
APNEA: 0
VOICE CHANGE: 0
SORE THROAT: 0
COUGH: 0
TROUBLE SWALLOWING: 0
FACIAL SWELLING: 0
EYE ITCHING: 0
SHORTNESS OF BREATH: 0
SINUS PRESSURE: 0

## 2023-03-03 ENCOUNTER — OFFICE VISIT (OUTPATIENT)
Dept: ENT CLINIC | Age: 68
End: 2023-03-03
Payer: MEDICARE

## 2023-03-03 VITALS — TEMPERATURE: 97 F | HEIGHT: 63 IN | WEIGHT: 200 LBS | BODY MASS INDEX: 35.44 KG/M2 | RESPIRATION RATE: 16 BRPM

## 2023-03-03 DIAGNOSIS — H61.21 IMPACTED CERUMEN OF RIGHT EAR: ICD-10-CM

## 2023-03-03 DIAGNOSIS — H73.002 MYRINGITIS, ACUTE, LEFT: Primary | ICD-10-CM

## 2023-03-03 PROCEDURE — 99213 OFFICE O/P EST LOW 20 MIN: CPT | Performed by: OTOLARYNGOLOGY

## 2023-03-03 PROCEDURE — 1036F TOBACCO NON-USER: CPT | Performed by: OTOLARYNGOLOGY

## 2023-03-03 PROCEDURE — 3017F COLORECTAL CA SCREEN DOC REV: CPT | Performed by: OTOLARYNGOLOGY

## 2023-03-03 PROCEDURE — G8484 FLU IMMUNIZE NO ADMIN: HCPCS | Performed by: OTOLARYNGOLOGY

## 2023-03-03 PROCEDURE — G8427 DOCREV CUR MEDS BY ELIG CLIN: HCPCS | Performed by: OTOLARYNGOLOGY

## 2023-03-03 PROCEDURE — G8417 CALC BMI ABV UP PARAM F/U: HCPCS | Performed by: OTOLARYNGOLOGY

## 2023-03-03 PROCEDURE — 1123F ACP DISCUSS/DSCN MKR DOCD: CPT | Performed by: OTOLARYNGOLOGY

## 2023-03-03 ASSESSMENT — ENCOUNTER SYMPTOMS
FACIAL SWELLING: 0
COUGH: 0
APNEA: 0
SORE THROAT: 0
TROUBLE SWALLOWING: 0
VOICE CHANGE: 0
SHORTNESS OF BREATH: 0
SINUS PRESSURE: 0
EYE ITCHING: 0

## 2023-03-03 NOTE — PROGRESS NOTES
[address], [address], [address] [place] [address]  P: [phone]       Patient     Marybeth Foil  1955    ChiefComplaint     Chief Complaint   Patient presents with    Follow-up     Patient states that he is here for a 1 week follow up. History of Present Illness     Joaquin Maxwell is a 66-year-old male here today for 1 week follow-up regarding left myringitis and right cerumen impaction. Has been using drops. Believes left ear is improving.     Past Medical History     Past Medical History:   Diagnosis Date    Allergic rhinitis     Anxiety     Arthritis     Bipolar Disorder     Diabetes mellitus (HCC)     Prediabetic    GERD (gastroesophageal reflux disease)     Hearing loss     HYPERCHOLESTERAEMIA     Hypertension     Hypothyroid     Kidney disease     Nephritis     OCD (obsessive compulsive disorder)     PTSD     Screening PSA (prostate specific antigen) 1.3.11    result - 2.91    Sleep apnea     does not use cpap       Past Surgical History     Past Surgical History:   Procedure Laterality Date    COLONOSCOPY  4.27.05    normal, Dr. Danny Robles    COLONOSCOPY  5/16/14    normal, rhoades    EXTERNAL AUDITORY CANAL RECONSTRUCTION      left 1980 and 1995    LYMPH NODE BIOPSY  4/27/2010    removed groin, benign, dr Pickett       gum surgery     TONSILLECTOMY      TOTAL HIP ARTHROPLASTY Right 9/15/2020    RIGHT DIRECT ANTERIOR APPROACH TOTAL HIP ARTHROPLASTY performed by Shannon Gar MD at [facility] Left 5/11/2021    DIRECT ANTERIOR APPROACH LEFT TOTAL HIP ARTHROPLASTY performed by Shannon Gar MD at [address] [place] [address]  5/16/14    Sy moore Friday, check in one year    UPPER GASTROINTESTINAL ENDOSCOPY  6/1/15    Sy Friday, check one year    UPPER GASTROINTESTINAL ENDOSCOPY  06/13/2018    oscar Dan Friday, check in 3 years       Family History     Family History   Problem Relation Age of Onset Heart Disease Mother     Heart Disease Father     Lung Cancer Sister     Arthritis Other        Social History     Social History     Tobacco Use    Smoking status: Never    Smokeless tobacco: Never   Vaping Use    Vaping Use: Never used   Substance Use Topics    Alcohol use: No     Alcohol/week: 0.0 standard drinks    Drug use: No        Allergies     Allergies   Allergen Reactions    Codeine Nausea Only    Nsaids      Kidney issues       Medications     Current Outpatient Medications   Medication Sig Dispense Refill    ciprofloxacin-dexamethasone (CIPRODEX) 0.3-0.1 % otic suspension Place 4 drops into the left ear 2 times daily for 10 days 1 each 0    levothyroxine (SYNTHROID) 150 MCG tablet Take 1 tablet by mouth daily 60 tablet 0    losartan (COZAAR) 50 MG tablet TAKE ONE TABLET BY MOUTH DAILY 90 tablet 1    simvastatin (ZOCOR) 40 MG tablet TAKE ONE TABLET BY MOUTH ONCE NIGHTLY 90 tablet 2    vitamin D3 (CHOLECALCIFEROL) 10 MCG (400 UNIT) TABS tablet Take 1 tablet by mouth daily      Probiotic Product (ALIGN PO) Take by mouth      fluvoxaMINE (LUVOX) 100 MG tablet nightly       pantoprazole (PROTONIX) 20 MG tablet Take 40 mg by mouth daily       ONE TOUCH ULTRA TEST strip TEST ONCE DAILY 30 strip 6    ONETOUCH DELICA LANCETS MISC USE AS DIRECTED 4 each 6    busPIRone (BUSPAR) 10 MG tablet Take 1 tablet by mouth 2 times daily. (Patient taking differently: 20 mg nightly 3 tablets at night) 60 tablet 4    desvenlafaxine succinate (PRISTIQ) 50 MG TB24 extended release tablet Take 50 mg by mouth daily. No current facility-administered medications for this visit. Review of Systems     Review of Systems   Constitutional:  Negative for appetite change, chills, fatigue, fever and unexpected weight change. HENT:  Positive for hearing loss.  Negative for congestion, ear discharge, ear pain, facial swelling, nosebleeds, postnasal drip, sinus pressure, sneezing, sore throat, tinnitus, trouble swallowing and voice change. Eyes:  Negative for itching. Respiratory:  Negative for apnea, cough and shortness of breath. Endocrine: Negative for cold intolerance and heat intolerance. Musculoskeletal:  Negative for myalgias and neck pain. Skin:  Negative for rash. Allergic/Immunologic: Negative for environmental allergies. Neurological:  Negative for dizziness and headaches. Psychiatric/Behavioral:  Negative for confusion, decreased concentration and sleep disturbance. PhysicalExam     Vitals:    03/03/23 1510   Resp: 16   Temp: 97 °F (36.1 °C)   TempSrc: Infrared   Weight: 200 lb (90.7 kg)   Height: 5' 3\" (1.6 m)       Constitutional:       Appearance: Normal appearance. HENT:      Head: Normocephalic. Nose: Nose normal.      Ears: Right cerumen impaction. Left TM improving posterior inferior aspect with granulation tissue remainder of eardrum normal  Eyes:      Extraocular Movements: Extraocular movements intact. Neck:      Musculoskeletal: Normal range of motion. Neurological:      General: No focal deficit present. Mental Status: She is alert and oriented to person, place, and time. Psychiatric:         Mood and Affect: Mood normal.         Behavior: Behavior normal.         Thought Content: Thought content normal.         Procedure     Removal Impacted Cerumen    An operating microscope was utilized to visualize the external auditory canals using a 3mm speculum. Cerumen was removed with curettes and gregory suctions on the right side. The tympanic membrane is intact. No fluid visualized in the middle ear. No complications. Assessment and Plan     1. Myringitis, acute, left  -Improving, only 25% of TM now involved with granulation tissue  -Continue Ciprodex    2. Impacted cerumen of right ear  -Removed without complication    Follow-up in 2 weeks      Erin Mckeon DO  3/3/23      Portions of this note were dictated using Dragon.  There may be linguistic errors secondary to the use of this program.

## 2023-03-20 ENCOUNTER — OFFICE VISIT (OUTPATIENT)
Dept: FAMILY MEDICINE CLINIC | Age: 68
End: 2023-03-20

## 2023-03-20 VITALS
OXYGEN SATURATION: 95 % | TEMPERATURE: 97.1 F | WEIGHT: 201 LBS | SYSTOLIC BLOOD PRESSURE: 150 MMHG | BODY MASS INDEX: 35.61 KG/M2 | HEART RATE: 46 BPM | HEIGHT: 63 IN | DIASTOLIC BLOOD PRESSURE: 70 MMHG

## 2023-03-20 DIAGNOSIS — I10 PRIMARY HYPERTENSION: ICD-10-CM

## 2023-03-20 DIAGNOSIS — Z00.00 MEDICARE ANNUAL WELLNESS VISIT, SUBSEQUENT: Primary | ICD-10-CM

## 2023-03-20 DIAGNOSIS — E78.2 MIXED HYPERLIPIDEMIA: ICD-10-CM

## 2023-03-20 DIAGNOSIS — E03.9 ACQUIRED HYPOTHYROIDISM: ICD-10-CM

## 2023-03-20 DIAGNOSIS — F31.60 BIPOLAR AFFECTIVE DISORDER, CURRENT EPISODE MIXED, CURRENT EPISODE SEVERITY UNSPECIFIED (HCC): Chronic | ICD-10-CM

## 2023-03-20 ASSESSMENT — PATIENT HEALTH QUESTIONNAIRE - PHQ9
SUM OF ALL RESPONSES TO PHQ QUESTIONS 1-9: 6
8. MOVING OR SPEAKING SO SLOWLY THAT OTHER PEOPLE COULD HAVE NOTICED. OR THE OPPOSITE, BEING SO FIGETY OR RESTLESS THAT YOU HAVE BEEN MOVING AROUND A LOT MORE THAN USUAL: 0
SUM OF ALL RESPONSES TO PHQ9 QUESTIONS 1 & 2: 2
SUM OF ALL RESPONSES TO PHQ QUESTIONS 1-9: 6
4. FEELING TIRED OR HAVING LITTLE ENERGY: 1
SUM OF ALL RESPONSES TO PHQ QUESTIONS 1-9: 6
2. FEELING DOWN, DEPRESSED OR HOPELESS: 1
SUM OF ALL RESPONSES TO PHQ QUESTIONS 1-9: 6
7. TROUBLE CONCENTRATING ON THINGS, SUCH AS READING THE NEWSPAPER OR WATCHING TELEVISION: 0
6. FEELING BAD ABOUT YOURSELF - OR THAT YOU ARE A FAILURE OR HAVE LET YOURSELF OR YOUR FAMILY DOWN: 3
10. IF YOU CHECKED OFF ANY PROBLEMS, HOW DIFFICULT HAVE THESE PROBLEMS MADE IT FOR YOU TO DO YOUR WORK, TAKE CARE OF THINGS AT HOME, OR GET ALONG WITH OTHER PEOPLE: 1
3. TROUBLE FALLING OR STAYING ASLEEP: 0
1. LITTLE INTEREST OR PLEASURE IN DOING THINGS: 1
5. POOR APPETITE OR OVEREATING: 0
9. THOUGHTS THAT YOU WOULD BE BETTER OFF DEAD, OR OF HURTING YOURSELF: 0

## 2023-03-20 ASSESSMENT — LIFESTYLE VARIABLES
HOW OFTEN DO YOU HAVE A DRINK CONTAINING ALCOHOL: NEVER
HOW MANY STANDARD DRINKS CONTAINING ALCOHOL DO YOU HAVE ON A TYPICAL DAY: PATIENT DOES NOT DRINK

## 2023-03-20 NOTE — PATIENT INSTRUCTIONS
Aging. Call [phone] or search [employer] online. You need 0215-4955 mg of calcium and 2191-7922 IU of vitamin D per day. It is possible to meet your calcium requirement with diet alone, but a vitamin D supplement is usually necessary to meet this goal.  When exposed to the sun, use a sunscreen that protects against both UVA and UVB radiation with an SPF of 30 or greater. Reapply every 2 to 3 hours or after sweating, drying off with a towel, or swimming. Always wear a seat belt when traveling in a car. Always wear a helmet when riding a bicycle or motorcycle.
911 or go to the nearest Emergency Room

## 2023-03-20 NOTE — PROGRESS NOTES
motion. Cervical back: Normal range of motion. Right lower leg: No edema. Left lower leg: No edema. Lymphadenopathy:      Cervical: No cervical adenopathy. Skin:     General: Skin is warm and dry. Capillary Refill: Capillary refill takes less than 2 seconds. Findings: No rash. Neurological:      General: No focal deficit present. Mental Status: He is alert. Psychiatric:         Mood and Affect: Mood normal.         Thought Content: Thought content normal.          Allergies   Allergen Reactions    Codeine Nausea Only    Nsaids      Kidney issues     Prior to Visit Medications    Medication Sig Taking? Authorizing Provider   levothyroxine (SYNTHROID) 150 MCG tablet Take 1 tablet by mouth daily Yes Sanchez Villarreal MD   simvastatin (ZOCOR) 40 MG tablet TAKE ONE TABLET BY MOUTH ONCE NIGHTLY Yes Ayesha Vera MD   vitamin D3 (CHOLECALCIFEROL) 10 MCG (400 UNIT) TABS tablet Take 1 tablet by mouth daily Yes Historical Provider, MD   Probiotic Product (ALIGN PO) Take by mouth Yes Historical Provider, MD   fluvoxaMINE (LUVOX) 100 MG tablet nightly  Yes Historical Provider, MD   pantoprazole (PROTONIX) 20 MG tablet Take 40 mg by mouth daily  Yes Historical Provider, MD   ONE TOUCH ULTRA TEST strip TEST ONCE DAILY Yes Ayesha Vera MD   busPIRone (BUSPAR) 10 MG tablet Take 1 tablet by mouth 2 times daily. Patient taking differently: 20 mg nightly 3 tablets at night Yes Ayesha Vera MD   desvenlafaxine succinate (PRISTIQ) 50 MG TB24 extended release tablet Take 50 mg by mouth daily.    Yes Historical Provider, MD   losartan (COZAAR) 50 MG tablet Take 1 tablet by mouth daily  Snachez Villarreal MD   amLODIPine (NORVASC) 5 MG tablet Take 1 tablet by mouth daily  Sanchez Villarreal MD       CareTe (Including outside providers/suppliers regularly involved in providing care):   Patient Care Team:  Sanchez Villarreal MD as PCP - General (Family Medicine)  Sanchez Villarreal MD as PCP - Empaneled Provider  Jayson Diaz

## 2023-03-22 ENCOUNTER — TELEPHONE (OUTPATIENT)
Dept: FAMILY MEDICINE CLINIC | Age: 68
End: 2023-03-22

## 2023-03-22 NOTE — TELEPHONE ENCOUNTER
Pt states his BP is \"Up High\". Yesterday was 195/90. Today is 180s/80's. Pt gets pressure in his head \"sometimes\". Please advise.

## 2023-03-23 DIAGNOSIS — I10 PRIMARY HYPERTENSION: Primary | ICD-10-CM

## 2023-03-23 RX ORDER — LOSARTAN POTASSIUM 50 MG/1
100 TABLET ORAL DAILY
Qty: 60 TABLET | Refills: 3 | Status: SHIPPED | OUTPATIENT
Start: 2023-03-23 | End: 2023-03-23 | Stop reason: ALTCHOICE

## 2023-03-23 RX ORDER — AMLODIPINE BESYLATE 5 MG/1
5 TABLET ORAL DAILY
Qty: 30 TABLET | Refills: 0 | Status: SHIPPED | OUTPATIENT
Start: 2023-03-23

## 2023-03-23 RX ORDER — LOSARTAN POTASSIUM 50 MG/1
50 TABLET ORAL DAILY
Qty: 30 TABLET | Refills: 0 | Status: SHIPPED | OUTPATIENT
Start: 2023-03-23

## 2023-03-23 NOTE — TELEPHONE ENCOUNTER
Advise patient to increase losartan dose to 100 mg daily, (2 tablets of losartan 50mg). If still having symptoms and blood pressure >180, go to ER.  Schedule follow up in 2 weeks ,  I sent in new Rx to pharmacy

## 2023-03-23 NOTE — TELEPHONE ENCOUNTER
Labs from 3/2/23 showed stable kidney function, but I added amlodipine which can be used in addition to losartan 50 mg daily and schedule follow up in clinic in 2 weeks (take amlodipine 5mg and losrtan 50mg , instead of 2 losartan)

## 2023-03-23 NOTE — TELEPHONE ENCOUNTER
I called the pt and informed him and he is schedueld for   Future Appointments   Date Time Provider Bandar Murphy   3[facility]24[facility]2023  3[facility]   3/24/2023  3:20 PM Nicole Higginbotham AND AUDIO [facility]   3/30/2023  3:00 PM MD STEPHEN Sprague   5/15/2023  2:40 PM MD STEPHEN Sprague

## 2023-03-23 NOTE — TELEPHONE ENCOUNTER
Patient states he has chronic nephritis  of kidneys  just wanted to make sure was ok to take the 2 losartan

## 2023-03-24 ENCOUNTER — OFFICE VISIT (OUTPATIENT)
Dept: ENT CLINIC | Age: 68
End: 2023-03-24
Payer: MEDICARE

## 2023-03-24 ENCOUNTER — PROCEDURE VISIT (OUTPATIENT)
Dept: AUDIOLOGY | Age: 68
End: 2023-03-24

## 2023-03-24 VITALS — TEMPERATURE: 97.2 F | WEIGHT: 201 LBS | BODY MASS INDEX: 35.61 KG/M2 | RESPIRATION RATE: 16 BRPM | HEIGHT: 63 IN

## 2023-03-24 DIAGNOSIS — H91.93 BILATERAL HEARING LOSS, UNSPECIFIED HEARING LOSS TYPE: Primary | ICD-10-CM

## 2023-03-24 DIAGNOSIS — H73.002 MYRINGITIS, ACUTE, LEFT: Primary | ICD-10-CM

## 2023-03-24 DIAGNOSIS — H61.21 IMPACTED CERUMEN OF RIGHT EAR: ICD-10-CM

## 2023-03-24 DIAGNOSIS — H90.A32 MIXED CONDUCTIVE AND SENSORINEURAL HEARING LOSS OF LEFT EAR WITH RESTRICTED HEARING OF RIGHT EAR: ICD-10-CM

## 2023-03-24 DIAGNOSIS — H93.8X2 PRESSURE SENSATION IN LEFT EAR: ICD-10-CM

## 2023-03-24 DIAGNOSIS — H90.3 ASYMMETRICAL SENSORINEURAL HEARING LOSS: Primary | ICD-10-CM

## 2023-03-24 PROCEDURE — 3017F COLORECTAL CA SCREEN DOC REV: CPT | Performed by: OTOLARYNGOLOGY

## 2023-03-24 PROCEDURE — 1123F ACP DISCUSS/DSCN MKR DOCD: CPT | Performed by: OTOLARYNGOLOGY

## 2023-03-24 PROCEDURE — G8417 CALC BMI ABV UP PARAM F/U: HCPCS | Performed by: OTOLARYNGOLOGY

## 2023-03-24 PROCEDURE — 1036F TOBACCO NON-USER: CPT | Performed by: OTOLARYNGOLOGY

## 2023-03-24 PROCEDURE — G8484 FLU IMMUNIZE NO ADMIN: HCPCS | Performed by: OTOLARYNGOLOGY

## 2023-03-24 PROCEDURE — 99213 OFFICE O/P EST LOW 20 MIN: CPT | Performed by: OTOLARYNGOLOGY

## 2023-03-24 PROCEDURE — G8427 DOCREV CUR MEDS BY ELIG CLIN: HCPCS | Performed by: OTOLARYNGOLOGY

## 2023-03-24 NOTE — PROGRESS NOTES
Torrence Boas   1955, 76 y.o. male   [id number]       Referring Provider: Mckinley Cristina DO  Referral Type: In an order in [address] [place] [address]    Reason for Visit: Evaluation of the cause of disorders of hearing, tinnitus, or balance. ADULT AUDIOLOGIC EVALUATION      Torrence Boas is a 76 y.o. male seen today, 3/24/2023 , for an initial audiologic evaluation. Patient was seen by Mckinley Cristina DO following today's evaluation. AUDIOLOGIC AND OTHER PERTINENT MEDICAL HISTORY:      Torrence Boas reports longstanding bilateral hearing loss. He has had 4 surgeries on his left ear. He currently reports a sensation of fullness that has been treated by Mckinley Cristina DO. No other significant otologic history reported. He denied otalgia, otorrhea, dizziness, imbalance, history of falls, and history of head trauma. Date: 3/24/2023     IMPRESSIONS:      Today's results revealed an asymmetrical sensorineural hearing loss with the right ear being the better ear. Excellent speech understanding when in quiet on the right ear and poor on the left. Tympanometry indicates normal middle ear function. Discussed test results and implications with patient. Discussed possible benefits of amplification. Patient is not interested in hearing aids. Follow medical recommendations of Mckinley Cristina DO.     ASSESSMENT AND FINDINGS:     Otoscopy unremarkable. RIGHT EAR:  Hearing Sensitivity: A mild to moderate sensorineural hearing loss. Speech Recognition Threshold: 40 dB HL  Word Recognition: Excellent 100%, based on NU-6 25-word list at 85 dBHL using recorded speech stimuli. Tympanometry: Normal peak pressure and compliance, Type A tympanogram, consistent with normal middle ear function. LEFT EAR:  Hearing Sensitivity: A moderate to profound sensorineural hearing loss.   Speech Recognition Threshold: 65 dB HL  Word Recognition: Very Poor 12%, based on NU-6 25-word list at 95 dBHL with masking using recorded speech

## 2023-03-24 NOTE — PROGRESS NOTES
Negative for myalgias and neck pain. Skin:  Negative for rash. Allergic/Immunologic: Negative for environmental allergies. Neurological:  Negative for dizziness and headaches. Psychiatric/Behavioral:  Negative for confusion, decreased concentration and sleep disturbance. PhysicalExam     Vitals:    03/24/23 1515   Resp: 16   Temp: 97.2 °F (36.2 °C)   TempSrc: Infrared   Weight: 201 lb (91.2 kg)   Height: 5' 3\" (1.6 m)       Constitutional:       Appearance: Normal appearance. HENT:      Head: Normocephalic. Nose: Nose normal.      Ears: Biateral EACs clear, Tms intact, middle ears clear. Myringitis resolved left TM  Eyes:      Extraocular Movements: Extraocular movements intact. Neck:      Musculoskeletal: Normal range of motion. Neurological:      General: No focal deficit present. Mental Status: She is alert and oriented to person, place, and time. Psychiatric:         Mood and Affect: Mood normal.         Behavior: Behavior normal.         Thought Content: Thought content normal.               Assessment and Plan     1. Myringitis, acute, left  -Resolved    2. Mixed conductive and sensorineural hearing loss of left ear with restricted hearing of right ear  -Audiogram reviewed and independently interpreted-right ear mild downsloping to severe high-frequency sensorineural hearing loss left ear mixed moderate downsloping to profound hearing loss with type a tympanograms bilaterally, word recognition score of 12% on the left ear  -Patient reports hearing is at its baseline as it has been since childhood not interested in any further intervention regarding hearing    Return as needed      Santos Grady DO  3/28/23      Portions of this note were dictated using Dragon.  There may be linguistic errors secondary to the use of this program.

## 2023-03-24 NOTE — PATIENT INSTRUCTIONS
Aid Evaluation\" with an audiologist to discuss your lifestyle, features of hearing aid technology, and styles of hearing aids available. It is recommended that you contact your insurance company to determine if you have a hearing aid benefit, as this may dictate who you can see for these services. Have hearing tests as your doctor suggests. They can show whether your hearing has changed. Your hearing aid may need to be adjusted. Use other assistive devices as needed. These may include:  Telephone amplifiers and hearing aids that can connect to a television, stereo, radio, or microphone. Devices that use lights or vibrations. These alert you to the doorbell, a ringing telephone, or a baby monitor. Television closed-captioning. This shows the words at the bottom of the screen. Most new TVs can do this. TTY (text telephone). This lets you type messages back and forth on the telephone instead of talking or listening. These devices are also called TDD. When messages are typed on the keyboard, they are sent over the phone line to a receiving TTY. The message is shown on a monitor. Use pagers, fax machines, text, and email if it is hard for you to communicate by telephone. Try to learn a listening technique called speech-reading. It is not lip-reading. You pay attention to people's gestures, expressions, posture, and tone of voice. These clues can help you understand what a person is saying. Face the person you are talking to, and have him or her face you. Make sure the lighting is good. You need to see the other person's face clearly. Think about counseling if you need help to adjust to your hearing loss. When should you call for help? Watch closely for changes in your health, and be sure to contact your doctor if:    You think your hearing is getting worse. You have new symptoms, such as dizziness or nausea.

## 2023-03-28 ASSESSMENT — ENCOUNTER SYMPTOMS
EYE ITCHING: 0
SORE THROAT: 0
SINUS PRESSURE: 0
SHORTNESS OF BREATH: 0
VOICE CHANGE: 0
APNEA: 0
FACIAL SWELLING: 0
COUGH: 0
TROUBLE SWALLOWING: 0

## 2023-03-30 ENCOUNTER — OFFICE VISIT (OUTPATIENT)
Dept: FAMILY MEDICINE CLINIC | Age: 68
End: 2023-03-30
Payer: MEDICARE

## 2023-03-30 VITALS
WEIGHT: 201 LBS | BODY MASS INDEX: 35.61 KG/M2 | SYSTOLIC BLOOD PRESSURE: 124 MMHG | HEART RATE: 50 BPM | OXYGEN SATURATION: 95 % | DIASTOLIC BLOOD PRESSURE: 72 MMHG | HEIGHT: 63 IN | TEMPERATURE: 97.1 F

## 2023-03-30 DIAGNOSIS — I10 PRIMARY HYPERTENSION: Primary | ICD-10-CM

## 2023-03-30 DIAGNOSIS — E78.2 MIXED HYPERLIPIDEMIA: ICD-10-CM

## 2023-03-30 DIAGNOSIS — E03.9 ACQUIRED HYPOTHYROIDISM: ICD-10-CM

## 2023-03-30 PROCEDURE — G8484 FLU IMMUNIZE NO ADMIN: HCPCS | Performed by: STUDENT IN AN ORGANIZED HEALTH CARE EDUCATION/TRAINING PROGRAM

## 2023-03-30 PROCEDURE — 3017F COLORECTAL CA SCREEN DOC REV: CPT | Performed by: STUDENT IN AN ORGANIZED HEALTH CARE EDUCATION/TRAINING PROGRAM

## 2023-03-30 PROCEDURE — 3078F DIAST BP <80 MM HG: CPT | Performed by: STUDENT IN AN ORGANIZED HEALTH CARE EDUCATION/TRAINING PROGRAM

## 2023-03-30 PROCEDURE — 99214 OFFICE O/P EST MOD 30 MIN: CPT | Performed by: STUDENT IN AN ORGANIZED HEALTH CARE EDUCATION/TRAINING PROGRAM

## 2023-03-30 PROCEDURE — 1123F ACP DISCUSS/DSCN MKR DOCD: CPT | Performed by: STUDENT IN AN ORGANIZED HEALTH CARE EDUCATION/TRAINING PROGRAM

## 2023-03-30 PROCEDURE — G8417 CALC BMI ABV UP PARAM F/U: HCPCS | Performed by: STUDENT IN AN ORGANIZED HEALTH CARE EDUCATION/TRAINING PROGRAM

## 2023-03-30 PROCEDURE — 1036F TOBACCO NON-USER: CPT | Performed by: STUDENT IN AN ORGANIZED HEALTH CARE EDUCATION/TRAINING PROGRAM

## 2023-03-30 PROCEDURE — 3074F SYST BP LT 130 MM HG: CPT | Performed by: STUDENT IN AN ORGANIZED HEALTH CARE EDUCATION/TRAINING PROGRAM

## 2023-03-30 PROCEDURE — G8427 DOCREV CUR MEDS BY ELIG CLIN: HCPCS | Performed by: STUDENT IN AN ORGANIZED HEALTH CARE EDUCATION/TRAINING PROGRAM

## 2023-03-30 NOTE — PROGRESS NOTES
and Affect: Mood normal.         Behavior: Behavior normal.         Thought Content: Thought content normal.         Judgment: Judgment normal.       ASSESSMENT/PLAN:  Primary hypertension  Controlled. At goal BP<150/90. Alex Reza Compliant with medications. Recently added amlodipine, in addition to losartan, due to elevated home BP.  - Continue amlodipine 5mg qd and losartan 50 mg daily   -? ? Discussed dietary sodium restrictions.   -Increase dietary efforts and physical activity.   -Follow-up in 3 month    Mixed hyperlipidemia  -Taking Zocor/simvastatin 40 mg daily  The 10-year ASCVD risk score (Carolyn DK, et al., 2019) is: 24.8%      HDL Cholesterol: 61 mg/dL      Total Cholesterol: 157 mg/dL  -Pending lipid panel for this year    Acquired hypothyroidism  Last TSH 0.17 (2/17/23), low, recently changed levothyroxine dose to 150 mcg.  -Repeat TSH in 6 to 8 weeks from 2/20/2023     Return in about 3 months (around 6/30/2023). An electronic signature was used to authenticate this note.     --Cal Osler, MD on 3/30/2023

## 2023-04-03 ENCOUNTER — TELEPHONE (OUTPATIENT)
Dept: FAMILY MEDICINE CLINIC | Age: 68
End: 2023-04-03

## 2023-04-04 DIAGNOSIS — E03.9 ACQUIRED HYPOTHYROIDISM: ICD-10-CM

## 2023-04-04 RX ORDER — LEVOTHYROXINE SODIUM 0.15 MG/1
TABLET ORAL
Qty: 60 TABLET | Refills: 0 | Status: SHIPPED | OUTPATIENT
Start: 2023-04-04

## 2023-04-04 NOTE — TELEPHONE ENCOUNTER
3/30/2023    Future Appointments   Date Time Provider Bandar Murphy   5/15/2023  2:40 PM MD STEPHEN Velez

## 2023-04-07 DIAGNOSIS — E78.2 MIXED HYPERLIPIDEMIA: ICD-10-CM

## 2023-04-07 DIAGNOSIS — E03.9 ACQUIRED HYPOTHYROIDISM: ICD-10-CM

## 2023-04-07 LAB
CHOLEST SERPL-MCNC: 144 MG/DL (ref 0–199)
HDLC SERPL-MCNC: 60 MG/DL (ref 40–60)
LDL CHOLESTEROL CALCULATED: 70 MG/DL
TRIGL SERPL-MCNC: 72 MG/DL (ref 0–150)
TSH SERPL DL<=0.005 MIU/L-ACNC: 0.45 UIU/ML (ref 0.27–4.2)
VLDLC SERPL CALC-MCNC: 14 MG/DL

## 2023-04-20 ENCOUNTER — TELEPHONE (OUTPATIENT)
Dept: FAMILY MEDICINE CLINIC | Age: 68
End: 2023-04-20

## 2023-04-20 DIAGNOSIS — I10 PRIMARY HYPERTENSION: ICD-10-CM

## 2023-04-20 RX ORDER — AMLODIPINE BESYLATE 5 MG/1
5 TABLET ORAL DAILY
Qty: 90 TABLET | Refills: 0 | Status: SHIPPED | OUTPATIENT
Start: 2023-04-20

## 2023-04-20 NOTE — TELEPHONE ENCOUNTER
amLODIPine (NORVASC) 5 MG tablet     3/30/2023 last appt    Future Appointments   Date Time Provider Bandar Murphy   5/15/2023  2:40 PM MD STEPHEN Watkins [phone]  [address], [address] [facility] [phone]

## 2023-05-01 DIAGNOSIS — E03.9 ACQUIRED HYPOTHYROIDISM: ICD-10-CM

## 2023-05-02 RX ORDER — LEVOTHYROXINE SODIUM 0.15 MG/1
150 TABLET ORAL DAILY
Qty: 90 TABLET | Refills: 1 | Status: SHIPPED | OUTPATIENT
Start: 2023-05-02

## 2023-05-09 ENCOUNTER — OFFICE VISIT (OUTPATIENT)
Dept: ENT CLINIC | Age: 68
End: 2023-05-09
Payer: MEDICARE

## 2023-05-09 VITALS — BODY MASS INDEX: 35.61 KG/M2 | WEIGHT: 201 LBS | HEIGHT: 63 IN | TEMPERATURE: 97.4 F | RESPIRATION RATE: 18 BRPM

## 2023-05-09 DIAGNOSIS — H73.002 MYRINGITIS, ACUTE, LEFT: Primary | ICD-10-CM

## 2023-05-09 DIAGNOSIS — H90.A32 MIXED CONDUCTIVE AND SENSORINEURAL HEARING LOSS OF LEFT EAR WITH RESTRICTED HEARING OF RIGHT EAR: ICD-10-CM

## 2023-05-09 PROCEDURE — 1036F TOBACCO NON-USER: CPT | Performed by: STUDENT IN AN ORGANIZED HEALTH CARE EDUCATION/TRAINING PROGRAM

## 2023-05-09 PROCEDURE — 3017F COLORECTAL CA SCREEN DOC REV: CPT | Performed by: STUDENT IN AN ORGANIZED HEALTH CARE EDUCATION/TRAINING PROGRAM

## 2023-05-09 PROCEDURE — G8417 CALC BMI ABV UP PARAM F/U: HCPCS | Performed by: STUDENT IN AN ORGANIZED HEALTH CARE EDUCATION/TRAINING PROGRAM

## 2023-05-09 PROCEDURE — G8427 DOCREV CUR MEDS BY ELIG CLIN: HCPCS | Performed by: STUDENT IN AN ORGANIZED HEALTH CARE EDUCATION/TRAINING PROGRAM

## 2023-05-09 PROCEDURE — 99214 OFFICE O/P EST MOD 30 MIN: CPT | Performed by: STUDENT IN AN ORGANIZED HEALTH CARE EDUCATION/TRAINING PROGRAM

## 2023-05-09 PROCEDURE — 1123F ACP DISCUSS/DSCN MKR DOCD: CPT | Performed by: STUDENT IN AN ORGANIZED HEALTH CARE EDUCATION/TRAINING PROGRAM

## 2023-05-09 RX ORDER — CIPROFLOXACIN AND DEXAMETHASONE 3; 1 MG/ML; MG/ML
4 SUSPENSION/ DROPS AURICULAR (OTIC) 2 TIMES DAILY
Qty: 7.5 ML | Refills: 0 | Status: SHIPPED | OUTPATIENT
Start: 2023-05-09 | End: 2023-05-14

## 2023-05-09 ASSESSMENT — ENCOUNTER SYMPTOMS
APNEA: 0
FACIAL SWELLING: 0
TROUBLE SWALLOWING: 0
SORE THROAT: 0
COUGH: 0
EYE ITCHING: 0
VOICE CHANGE: 0
SINUS PRESSURE: 0
SHORTNESS OF BREATH: 0

## 2023-05-09 NOTE — PROGRESS NOTES
[facility], [address], [facility] [address] [address]  P: [phone]       Patient     Reine Cowden  1955    ChiefComplaint     Chief Complaint   Patient presents with    Ear Problem     Ear rs stay clogged and feel like they have water in them. States that his q-tips are damp     History of Present Illness     Teresa Mccabe is a 69-year-old male here today for follow-up regarding left ear fullness. Reports improved but ear continues to feel clogged. Hearing at baseline. Interval history 5/9/2023  Teresa Mccabe has been doing well since last being seen. He states that this weekend he used a Q-tip and noticed bright red blood on his Q-tip.     Past Medical History     Past Medical History:   Diagnosis Date    Allergic rhinitis     Anxiety     Arthritis     Bipolar Disorder     Chronic kidney disease     Depression     Diabetes mellitus (HCC)     Prediabetic    GERD (gastroesophageal reflux disease)     Hearing loss     HYPERCHOLESTERAEMIA     Hypertension     Hypothyroid     Kidney disease     Nephritis     OCD (obsessive compulsive disorder)     Osteoarthritis     PTSD     Screening PSA (prostate specific antigen) 01/03/2011    result - 2.91    Sleep apnea     does not use cpap     Past Surgical History     Past Surgical History:   Procedure Laterality Date    COLONOSCOPY  04/27/2005    normal, Dr. Nik Alvarez    COLONOSCOPY  05/16/2014    normal, rhoades    EXTERNAL AUDITORY CANAL RECONSTRUCTION      left [address]  5/2020 9/2021    2 hips    LYMPH NODE BIOPSY  04/27/2010    removed groin, benign, dr Nelson Serrano      gum surgery     TONSILLECTOMY      TOTAL HIP ARTHROPLASTY Right 09/15/2020    RIGHT DIRECT ANTERIOR APPROACH TOTAL HIP ARTHROPLASTY performed by Davina Marcelo MD at [facility] Left 05/11/2021    DIRECT ANTERIOR APPROACH LEFT TOTAL HIP ARTHROPLASTY performed by Davina Marcelo MD at [address] [place] [address]

## 2023-05-15 ENCOUNTER — OFFICE VISIT (OUTPATIENT)
Dept: FAMILY MEDICINE CLINIC | Age: 68
End: 2023-05-15

## 2023-05-15 VITALS
DIASTOLIC BLOOD PRESSURE: 62 MMHG | HEART RATE: 49 BPM | RESPIRATION RATE: 16 BRPM | BODY MASS INDEX: 35.25 KG/M2 | TEMPERATURE: 97.3 F | SYSTOLIC BLOOD PRESSURE: 138 MMHG | WEIGHT: 199 LBS | OXYGEN SATURATION: 95 %

## 2023-05-15 DIAGNOSIS — I10 PRIMARY HYPERTENSION: Primary | ICD-10-CM

## 2023-05-15 DIAGNOSIS — E78.2 MIXED HYPERLIPIDEMIA: ICD-10-CM

## 2023-05-15 DIAGNOSIS — E55.9 VITAMIN D INSUFFICIENCY: ICD-10-CM

## 2023-05-15 DIAGNOSIS — E03.9 ACQUIRED HYPOTHYROIDISM: ICD-10-CM

## 2023-05-15 RX ORDER — ERGOCALCIFEROL 1.25 MG/1
50000 CAPSULE ORAL WEEKLY
COMMUNITY

## 2023-05-15 SDOH — ECONOMIC STABILITY: INCOME INSECURITY: HOW HARD IS IT FOR YOU TO PAY FOR THE VERY BASICS LIKE FOOD, HOUSING, MEDICAL CARE, AND HEATING?: NOT HARD AT ALL

## 2023-05-15 SDOH — ECONOMIC STABILITY: FOOD INSECURITY: WITHIN THE PAST 12 MONTHS, YOU WORRIED THAT YOUR FOOD WOULD RUN OUT BEFORE YOU GOT MONEY TO BUY MORE.: NEVER TRUE

## 2023-05-15 SDOH — ECONOMIC STABILITY: FOOD INSECURITY: WITHIN THE PAST 12 MONTHS, THE FOOD YOU BOUGHT JUST DIDN'T LAST AND YOU DIDN'T HAVE MONEY TO GET MORE.: NEVER TRUE

## 2023-05-15 ASSESSMENT — ANXIETY QUESTIONNAIRES
3. WORRYING TOO MUCH ABOUT DIFFERENT THINGS: 0
5. BEING SO RESTLESS THAT IT IS HARD TO SIT STILL: 0
2. NOT BEING ABLE TO STOP OR CONTROL WORRYING: 0
4. TROUBLE RELAXING: 0
6. BECOMING EASILY ANNOYED OR IRRITABLE: 0
7. FEELING AFRAID AS IF SOMETHING AWFUL MIGHT HAPPEN: 0
IF YOU CHECKED OFF ANY PROBLEMS ON THIS QUESTIONNAIRE, HOW DIFFICULT HAVE THESE PROBLEMS MADE IT FOR YOU TO DO YOUR WORK, TAKE CARE OF THINGS AT HOME, OR GET ALONG WITH OTHER PEOPLE: NOT DIFFICULT AT ALL
1. FEELING NERVOUS, ANXIOUS, OR ON EDGE: 0
GAD7 TOTAL SCORE: 0

## 2023-05-15 ASSESSMENT — PATIENT HEALTH QUESTIONNAIRE - PHQ9
1. LITTLE INTEREST OR PLEASURE IN DOING THINGS: 0
SUM OF ALL RESPONSES TO PHQ QUESTIONS 1-9: 0
8. MOVING OR SPEAKING SO SLOWLY THAT OTHER PEOPLE COULD HAVE NOTICED. OR THE OPPOSITE, BEING SO FIGETY OR RESTLESS THAT YOU HAVE BEEN MOVING AROUND A LOT MORE THAN USUAL: 0
4. FEELING TIRED OR HAVING LITTLE ENERGY: 0
3. TROUBLE FALLING OR STAYING ASLEEP: 0
6. FEELING BAD ABOUT YOURSELF - OR THAT YOU ARE A FAILURE OR HAVE LET YOURSELF OR YOUR FAMILY DOWN: 0
SUM OF ALL RESPONSES TO PHQ9 QUESTIONS 1 & 2: 0
SUM OF ALL RESPONSES TO PHQ QUESTIONS 1-9: 0
7. TROUBLE CONCENTRATING ON THINGS, SUCH AS READING THE NEWSPAPER OR WATCHING TELEVISION: 0
10. IF YOU CHECKED OFF ANY PROBLEMS, HOW DIFFICULT HAVE THESE PROBLEMS MADE IT FOR YOU TO DO YOUR WORK, TAKE CARE OF THINGS AT HOME, OR GET ALONG WITH OTHER PEOPLE: 0
2. FEELING DOWN, DEPRESSED OR HOPELESS: 0
SUM OF ALL RESPONSES TO PHQ QUESTIONS 1-9: 0
SUM OF ALL RESPONSES TO PHQ QUESTIONS 1-9: 0
5. POOR APPETITE OR OVEREATING: 0
9. THOUGHTS THAT YOU WOULD BE BETTER OFF DEAD, OR OF HURTING YOURSELF: 0

## 2023-05-15 NOTE — PATIENT INSTRUCTIONS
- Continue losartan 50 mg daily and amlodipine 5mg daily  - Continue levothyroxine 150 mcg daily.    - Continue healthy diet and exercise

## 2023-05-15 NOTE — PROGRESS NOTES
[address], [address] [place] [address]  Tel: [phone]      5/15/2023     YONAS Feng (:  1955) is a 76 y.o. male, here for evaluation of the following medical concerns:  Chief Complaint   Patient presents with    Hypertension     Follow up, average b/p has been 115/59 bs 118 sugar is high in morning      Hypertension:  Taking losartan 50 mg daily and amlodipine 5mg daily in the morning. Home blood pressure was 110s/60s average   No recent lightheadedness or headaches. Denies blurred vision, chest pain, orthopnea, palpitations, or peripheral edema. Endorses fatigue, with exertion. History of sleep apnea, did not tolerate CPAP. Exercise: Walks dog, 3 times a  week, 30- 45 minutes. Diet changes: Diet includes salmon, drum stick baked and asparagus. Prediabetic  Checks blood sugars 110--140 fasting after meals. Not taking any medications. Taking simvastatin 40mg daily. No myalgias or GI upset     Hypothyroidism:   Takes levothyroxine 150 mcg daily. Denies weight changes, change in bowel pattern, heat/ cold intolerance, dyspnea on exertion, irregular heart rate or worsening tremor. Endorses fatigue. Vitamin D insuffiencey   Taking Vitamin D3 10770 units every week from GI, recheck in 8 weeks. Prior to Visit Medications    Medication Sig Taking?  Authorizing Provider   ergocalciferol (ERGOCALCIFEROL) 1.25 MG (60018 UT) capsule Take 1 capsule by mouth once a week Yes Historical Provider, MD   levothyroxine (SYNTHROID) 150 MCG tablet Take 1 tablet by mouth Daily Yes Cal Osler, MD   amLODIPine (NORVASC) 5 MG tablet Take 1 tablet by mouth daily Yes Cal Osler, MD   losartan (COZAAR) 50 MG tablet Take 1 tablet by mouth daily Yes Cal Osler, MD   simvastatin (ZOCOR) 40 MG tablet TAKE ONE TABLET BY MOUTH ONCE NIGHTLY Yes Jen Medina MD   fluvoxaMINE (LUVOX) 100 MG tablet nightly  Yes Historical Provider, MD

## 2023-05-23 ENCOUNTER — OFFICE VISIT (OUTPATIENT)
Dept: ENT CLINIC | Age: 68
End: 2023-05-23
Payer: MEDICARE

## 2023-05-23 VITALS — HEIGHT: 63 IN | RESPIRATION RATE: 16 BRPM | BODY MASS INDEX: 35.26 KG/M2 | WEIGHT: 199 LBS

## 2023-05-23 DIAGNOSIS — H73.002 MYRINGITIS, ACUTE, LEFT: Primary | ICD-10-CM

## 2023-05-23 DIAGNOSIS — H90.A32 MIXED CONDUCTIVE AND SENSORINEURAL HEARING LOSS OF LEFT EAR WITH RESTRICTED HEARING OF RIGHT EAR: ICD-10-CM

## 2023-05-23 PROCEDURE — G8427 DOCREV CUR MEDS BY ELIG CLIN: HCPCS | Performed by: STUDENT IN AN ORGANIZED HEALTH CARE EDUCATION/TRAINING PROGRAM

## 2023-05-23 PROCEDURE — 1036F TOBACCO NON-USER: CPT | Performed by: STUDENT IN AN ORGANIZED HEALTH CARE EDUCATION/TRAINING PROGRAM

## 2023-05-23 PROCEDURE — 1123F ACP DISCUSS/DSCN MKR DOCD: CPT | Performed by: STUDENT IN AN ORGANIZED HEALTH CARE EDUCATION/TRAINING PROGRAM

## 2023-05-23 PROCEDURE — G8417 CALC BMI ABV UP PARAM F/U: HCPCS | Performed by: STUDENT IN AN ORGANIZED HEALTH CARE EDUCATION/TRAINING PROGRAM

## 2023-05-23 PROCEDURE — 99213 OFFICE O/P EST LOW 20 MIN: CPT | Performed by: STUDENT IN AN ORGANIZED HEALTH CARE EDUCATION/TRAINING PROGRAM

## 2023-05-23 PROCEDURE — 3017F COLORECTAL CA SCREEN DOC REV: CPT | Performed by: STUDENT IN AN ORGANIZED HEALTH CARE EDUCATION/TRAINING PROGRAM

## 2023-05-23 ASSESSMENT — ENCOUNTER SYMPTOMS
EYE ITCHING: 0
SORE THROAT: 0
APNEA: 0
TROUBLE SWALLOWING: 0
VOICE CHANGE: 0
SHORTNESS OF BREATH: 0
SINUS PRESSURE: 0
FACIAL SWELLING: 0
COUGH: 0

## 2023-05-23 NOTE — PROGRESS NOTES
facility-administered medications for this visit. Review of Systems     Review of Systems   Constitutional:  Negative for appetite change, chills, fatigue, fever and unexpected weight change. HENT:  Positive for hearing loss. Negative for congestion, ear discharge, ear pain, facial swelling, nosebleeds, postnasal drip, sinus pressure, sneezing, sore throat, tinnitus, trouble swallowing and voice change. Eyes:  Negative for itching. Respiratory:  Negative for apnea, cough and shortness of breath. Endocrine: Negative for cold intolerance and heat intolerance. Musculoskeletal:  Negative for myalgias and neck pain. Skin:  Negative for rash. Allergic/Immunologic: Negative for environmental allergies. Neurological:  Negative for dizziness and headaches. Psychiatric/Behavioral:  Negative for confusion, decreased concentration and sleep disturbance. PhysicalExam     Vitals:    05/23/23 1508   Resp: 16   Weight: 199 lb (90.3 kg)   Height: 5' 3\" (1.6 m)     Constitutional:       Appearance: Normal appearance. HENT:      Head: Normocephalic. Nose: Nose normal.      Ears: Biateral EACs clear, Tms intact, middle ears clear   Eyes:      Extraocular Movements: Extraocular movements intact. Neck:      Musculoskeletal: Normal range of motion. Neurological:      General: No focal deficit present. Mental Status: She is alert and oriented to person, place, and time. Psychiatric:         Mood and Affect: Mood normal.         Behavior: Behavior normal.         Thought Content: Thought content normal.     Assessment and Plan     ASSESSMENT/PLAN  Sam Kapadia is a very pleasant 76 y.o. male with     1. Myringitis, acute, left  Myringitis has resolved this point time. I informed him to keep the drops as he can use in the future if he notices similar symptoms.     2. Mixed conductive and sensorineural hearing loss of left ear with restricted hearing of right ear  Monitor with yearly

## 2023-05-27 ENCOUNTER — OFFICE VISIT (OUTPATIENT)
Dept: URGENT CARE | Age: 68
End: 2023-05-27

## 2023-05-27 VITALS
BODY MASS INDEX: 34.73 KG/M2 | TEMPERATURE: 101.6 F | WEIGHT: 196 LBS | SYSTOLIC BLOOD PRESSURE: 113 MMHG | DIASTOLIC BLOOD PRESSURE: 70 MMHG | OXYGEN SATURATION: 93 % | HEART RATE: 57 BPM | HEIGHT: 63 IN | RESPIRATION RATE: 16 BRPM

## 2023-05-27 DIAGNOSIS — U07.1 COVID-19: Primary | ICD-10-CM

## 2023-05-27 PROBLEM — E11.9 TYPE 2 DIABETES MELLITUS (HCC): Status: ACTIVE | Noted: 2023-05-27

## 2023-05-27 LAB
Lab: ABNORMAL
QC PASS/FAIL: ABNORMAL
SARS-COV-2 RDRP RESP QL NAA+PROBE: POSITIVE

## 2023-05-27 ASSESSMENT — ENCOUNTER SYMPTOMS
SHORTNESS OF BREATH: 0
SINUS PRESSURE: 0
RHINORRHEA: 0
COUGH: 1
SORE THROAT: 0
SINUS PAIN: 0

## 2023-05-27 NOTE — PATIENT INSTRUCTIONS
NAAT COVID positive in clinic today   Results and CDC isolation guidelines reviewed  Paxlovid prescribed (risks and benefits discussed). Stop simvastatin while on Paxlovid. GFR reviewed  Increase fluid intake   ER evaluation if symptoms persist or if symptoms worsen. New Prescriptions    NIRMATRELVIR/RITONAVIR 300/100 (PAXLOVID, 300/100,) 20 X 150 MG & 10 X 100MG TBPK    Take 3 tablets (two 150 mg nirmatrelvir and one 100 mg ritonavir tablets) by mouth every 12 hours for 5 days.

## 2023-05-27 NOTE — PROGRESS NOTES
Pretty Millan (:  1955) is a 76 y.o. male,New patient, here for evaluation of the following chief complaint(s):  Otalgia (Has had ear infections off and on x 3 months. Was on antibiotics spiradically since then. Started with cough a couple days ago. Has been fatigued, chills. Bad cough began 3 days ago, dizzy, very tired. Headache) and Headache      ASSESSMENT/PLAN:    ICD-10-CM    1. COVID-19  U07.1 POCT COVID-19 Rapid, NAAT     nirmatrelvir/ritonavir 300/100 (PAXLOVID, 300/100,) 20 x 150 MG & 10 x 100MG TBPK          NAAT COVID positive in clinic today   Results and CDC isolation guidelines reviewed  Paxlovid prescribed (risks and benefits discussed). Stop simvastatin while on Paxlovid. GFR reviewed  Increase fluid intake   ER evaluation if symptoms persist or if symptoms worsen. Reviewed extensively with patient and his wife. SUBJECTIVE/OBJECTIVE:    History provided by:  Patient   used: No    HPI:   76 y.o. male presents with symptoms of cough ongoing since 2025. Has been fatigued and dizzy since . Felt feverish  but did not check temperature at home. Fatigue is most bothersome. Denies known COVID exposure. Has not eaten today, has only had one glass of water due to fatigue. Has not taken any medications for symptoms. He is followed by ENT for frequent ear infections. Vitals:    23 1532   BP: 113/70   Site: Right Upper Arm   Position: Sitting   Pulse: 57   Resp: 16   Temp: (!) 101.6 °F (38.7 °C)   TempSrc: Oral   SpO2: 93%   Weight: 196 lb (88.9 kg)   Height: 5' 3\" (1.6 m)       Review of Systems   Constitutional:  Positive for chills, diaphoresis and fatigue. Negative for fever. HENT:  Negative for congestion, ear pain, rhinorrhea, sinus pressure, sinus pain and sore throat. Respiratory:  Positive for cough (productive of gray mucous). Negative for shortness of breath. Neurological:  Positive for headaches.      Physical Exam  Vitals

## 2023-06-22 DIAGNOSIS — I10 PRIMARY HYPERTENSION: ICD-10-CM

## 2023-06-22 DIAGNOSIS — E03.9 ACQUIRED HYPOTHYROIDISM: ICD-10-CM

## 2023-06-22 RX ORDER — LOSARTAN POTASSIUM 50 MG/1
50 TABLET ORAL DAILY
Qty: 30 TABLET | Refills: 0 | Status: CANCELLED | OUTPATIENT
Start: 2023-06-22

## 2023-06-22 NOTE — TELEPHONE ENCOUNTER
5/15/2023           Future Appointments   Date Time Provider Bandar Murphy   11/15/2023  3:00 PM MD STEPHEN Calderon

## 2023-06-23 ENCOUNTER — TELEPHONE (OUTPATIENT)
Dept: FAMILY MEDICINE CLINIC | Age: 68
End: 2023-06-23

## 2023-06-23 DIAGNOSIS — I10 PRIMARY HYPERTENSION: ICD-10-CM

## 2023-06-23 RX ORDER — AMLODIPINE BESYLATE 5 MG/1
5 TABLET ORAL DAILY
Qty: 90 TABLET | Refills: 0 | OUTPATIENT
Start: 2023-06-23

## 2023-06-23 RX ORDER — LEVOTHYROXINE SODIUM 0.15 MG/1
150 TABLET ORAL DAILY
Qty: 90 TABLET | Refills: 1 | OUTPATIENT
Start: 2023-06-23

## 2023-06-23 RX ORDER — LOSARTAN POTASSIUM 50 MG/1
50 TABLET ORAL DAILY
Qty: 90 TABLET | Refills: 0 | Status: SHIPPED | OUTPATIENT
Start: 2023-06-23

## 2023-06-23 RX ORDER — AMLODIPINE BESYLATE 5 MG/1
5 TABLET ORAL DAILY
Qty: 90 TABLET | Refills: 0 | Status: SHIPPED | OUTPATIENT
Start: 2023-07-10 | End: 2023-06-23

## 2023-06-23 RX ORDER — AMLODIPINE BESYLATE 5 MG/1
5 TABLET ORAL DAILY
Qty: 90 TABLET | Refills: 0 | Status: SHIPPED | OUTPATIENT
Start: 2023-07-10

## 2023-06-27 ENCOUNTER — TELEPHONE (OUTPATIENT)
Dept: FAMILY MEDICINE CLINIC | Age: 68
End: 2023-06-27

## 2023-06-27 DIAGNOSIS — E78.2 MIXED HYPERLIPIDEMIA: Primary | ICD-10-CM

## 2023-06-27 RX ORDER — SIMVASTATIN 40 MG
40 TABLET ORAL NIGHTLY
Qty: 90 TABLET | Refills: 2 | Status: SHIPPED | OUTPATIENT
Start: 2023-06-27

## 2023-07-10 RX ORDER — AMOXICILLIN 500 MG/1
CAPSULE ORAL
Qty: 16 CAPSULE | Refills: 0 | Status: SHIPPED | OUTPATIENT
Start: 2023-07-10

## 2023-07-10 NOTE — TELEPHONE ENCOUNTER
Patient needs a refill on amoxacillin. They need a 16 500 mg capsules, this is a year supply, He gets his teeth cleaned 4X per year.        Pharmacy: [address] [place], [place] [id number] Debby Mao [phone]    11/15/2023  3:00 PM [facility] Chris Chavez MD    Appointment Notes:    Return in about 6 months (around 11/15/2023) for Thyroid disease and Blood sugar check

## 2023-07-17 DIAGNOSIS — I10 PRIMARY HYPERTENSION: ICD-10-CM

## 2023-07-17 RX ORDER — AMLODIPINE BESYLATE 5 MG/1
TABLET ORAL
Qty: 90 TABLET | Refills: 0 | OUTPATIENT
Start: 2023-07-17

## 2023-07-17 NOTE — TELEPHONE ENCOUNTER
Future Appointments   Date Time Provider [address]   11/15/2023  3:00 PM MD STEPHEN Danielle 5/15/2023

## 2023-07-21 NOTE — TELEPHONE ENCOUNTER
Sent Rx to mail order pharmac y
losartan (COZAAR) 50 MG tablet     5/15/2023       Future Appointments   Date Time Provider Bandar Murphy   11/15/2023  3:00 PM MD Livan Watkins. [address], [address] [place] [address] [phone] [facility] [phone]
unable to follow commands necessary to complete

## 2023-08-20 ENCOUNTER — OFFICE VISIT (OUTPATIENT)
Dept: URGENT CARE | Age: 68
End: 2023-08-20

## 2023-08-20 VITALS
HEIGHT: 63 IN | SYSTOLIC BLOOD PRESSURE: 138 MMHG | HEART RATE: 44 BPM | OXYGEN SATURATION: 94 % | BODY MASS INDEX: 35.08 KG/M2 | DIASTOLIC BLOOD PRESSURE: 74 MMHG | TEMPERATURE: 98.4 F | WEIGHT: 198 LBS

## 2023-08-20 DIAGNOSIS — M25.522 LEFT ELBOW PAIN: ICD-10-CM

## 2023-08-20 DIAGNOSIS — M25.532 LEFT WRIST PAIN: Primary | ICD-10-CM

## 2023-08-20 DIAGNOSIS — M25.512 ACUTE PAIN OF LEFT SHOULDER: ICD-10-CM

## 2023-08-20 RX ORDER — ACETAMINOPHEN 500 MG
500 TABLET ORAL EVERY 4 HOURS PRN
Qty: 60 TABLET | Refills: 0 | Status: SHIPPED | OUTPATIENT
Start: 2023-08-20

## 2023-08-20 NOTE — PROGRESS NOTES
Right upper arm: Normal.      Left upper arm: Tenderness (mild - over biceps) present. No swelling, deformity, lacerations or bony tenderness. Left elbow: No swelling, deformity or lacerations. Decreased range of motion (pt refuses to allow for full extension or flexion due to noted pain). Tenderness (diffuse, noted mostly over soft tissue, no bony tenderness) present. Right forearm: Normal.      Left forearm: Tenderness (mild - noted worse near the wrist) present. No swelling, deformity, lacerations or bony tenderness. Right wrist: Normal.      Left wrist: Swelling (mild, over dorsum of the wrist), tenderness (significant and diffuse, especially over dorsum of the wrist) and crepitus (noted with attempted pronation/supination) present. No deformity or lacerations. Decreased range of motion (significant, due to tenderness). Normal pulse. Right hand: Normal.      Left hand: Swelling (mild - over dorsum of the hand starting at the wrist) and tenderness (diffuse, over dorsum of the hand, most significant near the wrist) present. No deformity or lacerations. Decreased range of motion (mild - noted hand weakness with grasping). Decreased strength (with grasp). Normal sensation. Normal capillary refill. Normal pulse. Cervical back: Normal range of motion and neck supple. Skin:     General: Skin is warm and dry. Capillary Refill: Capillary refill takes less than 2 seconds. Neurological:      Mental Status: He is alert and oriented to person, place, and time. Psychiatric:         Mood and Affect: Mood normal.         Behavior: Behavior normal.       RESULTS:    Xray Result (most recent):  XR HAND LEFT (MIN 3 VIEWS) 08/20/2023    Narrative  EXAMINATION:  THREE XRAY VIEWS OF THE LEFT HAND    8/20/2023 2:24 pm    COMPARISON:  None.     HISTORY:  ORDERING SYSTEM PROVIDED HISTORY: Left wrist pain  TECHNOLOGIST PROVIDED HISTORY:  Reason for exam:->r/o fractures or other acute bony

## 2023-08-20 NOTE — PATIENT INSTRUCTIONS
Without fractures noted on x-ray, concern for overuse syndrome along with possible muscle strain. Acetaminophen prescribed for pain control - take as directed. Ace wrap provided for the wrist to add stability and compression. RICE therapy as discussed. Follow up with orthopedics if symptoms persist or if symptoms worsen. Discussed pt's elevated BP noted during initial vital signs assessment - pt notes concern for being in pain. Discussed continued at-home monitoring of BP and follow up with PCP should elevated BP readings persist at home. If headaches, worsened back pain, abdominal pain, chest pain, shortness of breath, weakness, numbness, or worsened concerns develop, follow up with the ER for further evaluation.       New Prescriptions    ACETAMINOPHEN (TYLENOL) 500 MG TABLET    Take 1 tablet by mouth every 4 hours as needed for Pain

## 2023-08-21 ASSESSMENT — ENCOUNTER SYMPTOMS
COUGH: 0
VOMITING: 0
SHORTNESS OF BREATH: 0
NAUSEA: 0
ABDOMINAL PAIN: 0
DIARRHEA: 0

## 2023-09-05 DIAGNOSIS — I10 PRIMARY HYPERTENSION: ICD-10-CM

## 2023-09-05 DIAGNOSIS — E03.9 ACQUIRED HYPOTHYROIDISM: ICD-10-CM

## 2023-09-05 RX ORDER — AMLODIPINE BESYLATE 5 MG/1
5 TABLET ORAL DAILY
Qty: 90 TABLET | Refills: 0 | Status: SHIPPED | OUTPATIENT
Start: 2023-09-05

## 2023-09-05 RX ORDER — LOSARTAN POTASSIUM 50 MG/1
50 TABLET ORAL DAILY
Qty: 90 TABLET | Refills: 0 | Status: SHIPPED | OUTPATIENT
Start: 2023-09-05

## 2023-09-05 RX ORDER — LEVOTHYROXINE SODIUM 0.15 MG/1
150 TABLET ORAL DAILY
Qty: 90 TABLET | Refills: 1 | Status: SHIPPED | OUTPATIENT
Start: 2023-09-05

## 2023-09-05 NOTE — TELEPHONE ENCOUNTER
Future Appointments   Date Time Provider [address]   11/15/2023  3:00 PM Loren Koroma MD [facility] Visit date not found

## 2023-11-15 ENCOUNTER — OFFICE VISIT (OUTPATIENT)
Dept: FAMILY MEDICINE CLINIC | Age: 68
End: 2023-11-15

## 2023-11-15 VITALS
HEIGHT: 63 IN | DIASTOLIC BLOOD PRESSURE: 71 MMHG | OXYGEN SATURATION: 96 % | BODY MASS INDEX: 35.61 KG/M2 | SYSTOLIC BLOOD PRESSURE: 120 MMHG | TEMPERATURE: 97.1 F | WEIGHT: 201 LBS | HEART RATE: 42 BPM | RESPIRATION RATE: 16 BRPM

## 2023-11-15 DIAGNOSIS — E78.2 MIXED HYPERLIPIDEMIA: ICD-10-CM

## 2023-11-15 DIAGNOSIS — F31.60 BIPOLAR AFFECTIVE DISORDER, CURRENT EPISODE MIXED, CURRENT EPISODE SEVERITY UNSPECIFIED (HCC): Chronic | ICD-10-CM

## 2023-11-15 DIAGNOSIS — I10 PRIMARY HYPERTENSION: Primary | ICD-10-CM

## 2023-11-15 DIAGNOSIS — E11.9 TYPE 2 DIABETES MELLITUS WITHOUT COMPLICATION, WITHOUT LONG-TERM CURRENT USE OF INSULIN (HCC): ICD-10-CM

## 2023-11-15 DIAGNOSIS — E03.9 ACQUIRED HYPOTHYROIDISM: ICD-10-CM

## 2023-11-15 DIAGNOSIS — Z23 FLU VACCINE NEED: ICD-10-CM

## 2023-11-15 LAB
CREATININE URINE POCT: NORMAL
HBA1C MFR BLD: 6.7 %
MICROALBUMIN/CREAT 24H UR: NORMAL MG/G{CREAT}
MICROALBUMIN/CREAT UR-RTO: NORMAL

## 2023-11-15 RX ORDER — LOSARTAN POTASSIUM 50 MG/1
50 TABLET ORAL DAILY
Qty: 90 TABLET | Refills: 1 | Status: SHIPPED | OUTPATIENT
Start: 2023-11-15

## 2023-11-15 RX ORDER — AMLODIPINE BESYLATE 5 MG/1
5 TABLET ORAL DAILY
Qty: 90 TABLET | Refills: 1 | Status: SHIPPED | OUTPATIENT
Start: 2023-11-15

## 2023-11-15 ASSESSMENT — PATIENT HEALTH QUESTIONNAIRE - PHQ9
6. FEELING BAD ABOUT YOURSELF - OR THAT YOU ARE A FAILURE OR HAVE LET YOURSELF OR YOUR FAMILY DOWN: 0
3. TROUBLE FALLING OR STAYING ASLEEP: 0
10. IF YOU CHECKED OFF ANY PROBLEMS, HOW DIFFICULT HAVE THESE PROBLEMS MADE IT FOR YOU TO DO YOUR WORK, TAKE CARE OF THINGS AT HOME, OR GET ALONG WITH OTHER PEOPLE: 0
7. TROUBLE CONCENTRATING ON THINGS, SUCH AS READING THE NEWSPAPER OR WATCHING TELEVISION: 0
SUM OF ALL RESPONSES TO PHQ QUESTIONS 1-9: 0
2. FEELING DOWN, DEPRESSED OR HOPELESS: 0
SUM OF ALL RESPONSES TO PHQ9 QUESTIONS 1 & 2: 0
SUM OF ALL RESPONSES TO PHQ QUESTIONS 1-9: 0
5. POOR APPETITE OR OVEREATING: 0
SUM OF ALL RESPONSES TO PHQ QUESTIONS 1-9: 0
SUM OF ALL RESPONSES TO PHQ QUESTIONS 1-9: 0
4. FEELING TIRED OR HAVING LITTLE ENERGY: 0
9. THOUGHTS THAT YOU WOULD BE BETTER OFF DEAD, OR OF HURTING YOURSELF: 0
8. MOVING OR SPEAKING SO SLOWLY THAT OTHER PEOPLE COULD HAVE NOTICED. OR THE OPPOSITE, BEING SO FIGETY OR RESTLESS THAT YOU HAVE BEEN MOVING AROUND A LOT MORE THAN USUAL: 0
1. LITTLE INTEREST OR PLEASURE IN DOING THINGS: 0

## 2023-11-15 ASSESSMENT — COLUMBIA-SUICIDE SEVERITY RATING SCALE - C-SSRS
4. HAVE YOU HAD THESE THOUGHTS AND HAD SOME INTENTION OF ACTING ON THEM?: NO
3. HAVE YOU BEEN THINKING ABOUT HOW YOU MIGHT KILL YOURSELF?: NO
5. HAVE YOU STARTED TO WORK OUT OR WORKED OUT THE DETAILS OF HOW TO KILL YOURSELF? DO YOU INTEND TO CARRY OUT THIS PLAN?: NO
7. DID THIS OCCUR IN THE LAST THREE MONTHS: NO

## 2023-11-16 ENCOUNTER — TELEPHONE (OUTPATIENT)
Dept: FAMILY MEDICINE CLINIC | Age: 68
End: 2023-11-16

## 2023-11-16 DIAGNOSIS — E78.2 MIXED HYPERLIPIDEMIA: ICD-10-CM

## 2023-11-16 DIAGNOSIS — M79.672 LEFT FOOT PAIN: Primary | ICD-10-CM

## 2023-11-16 NOTE — TELEPHONE ENCOUNTER
----- Message from Conner Barr sent at 11/16/2023  4:41 PM EST -----  Subject: Referral Request    Reason for referral request? call pt about that referral for a podiatrist  Provider patient wants to be referred to(if known):     Provider Phone Number(if known):     Additional Information for Provider?   ---------------------------------------------------------------------------  --------------  [address]    [phone]; OK to leave message on voicemail  ---------------------------------------------------------------------------  --------------

## 2023-11-17 ENCOUNTER — PATIENT MESSAGE (OUTPATIENT)
Dept: FAMILY MEDICINE CLINIC | Age: 68
End: 2023-11-17

## 2023-11-17 LAB
CHOLEST SERPL-MCNC: 171 MG/DL (ref 0–199)
HDLC SERPL-MCNC: 59 MG/DL (ref 40–60)
LDL CHOLESTEROL CALCULATED: 96 MG/DL
TRIGL SERPL-MCNC: 82 MG/DL (ref 0–150)
VLDLC SERPL CALC-MCNC: 16 MG/DL

## 2023-11-17 NOTE — TELEPHONE ENCOUNTER
Please update with referral info     Sergio Estrada, [address] [place] [address], [address], [address]   Phone: [phone]  Fax: [phone]

## 2023-12-14 NOTE — TELEPHONE ENCOUNTER
Refill sent on 07/10/2023 Thank you for your visit today.    Please complete your labs(PSA)  prior to follow up with Dr. Nye in July 2024

## 2024-01-30 DIAGNOSIS — E78.2 MIXED HYPERLIPIDEMIA: ICD-10-CM

## 2024-01-30 RX ORDER — SIMVASTATIN 40 MG
40 TABLET ORAL NIGHTLY
Qty: 90 TABLET | Refills: 2 | Status: SHIPPED | OUTPATIENT
Start: 2024-01-30 | End: 2024-01-30

## 2024-01-30 RX ORDER — SIMVASTATIN 40 MG
40 TABLET ORAL NIGHTLY
Qty: 90 TABLET | Refills: 2 | Status: SHIPPED | OUTPATIENT
Start: 2024-01-30

## 2024-01-30 NOTE — TELEPHONE ENCOUNTER
Future Appointments   Date Time Provider Department Center   2/15/2024  2:40 PM Aicha Bolton MD [facility] [place] [facility]     LOV 11/15/2023

## 2024-02-15 ENCOUNTER — OFFICE VISIT (OUTPATIENT)
Dept: FAMILY MEDICINE CLINIC | Age: 69
End: 2024-02-15

## 2024-02-15 VITALS
HEART RATE: 44 BPM | DIASTOLIC BLOOD PRESSURE: 66 MMHG | SYSTOLIC BLOOD PRESSURE: 116 MMHG | TEMPERATURE: 97.1 F | WEIGHT: 195 LBS | BODY MASS INDEX: 34.55 KG/M2 | HEIGHT: 63 IN | OXYGEN SATURATION: 96 % | RESPIRATION RATE: 16 BRPM

## 2024-02-15 DIAGNOSIS — E03.9 ACQUIRED HYPOTHYROIDISM: ICD-10-CM

## 2024-02-15 DIAGNOSIS — F31.60 BIPOLAR AFFECTIVE DISORDER, CURRENT EPISODE MIXED, CURRENT EPISODE SEVERITY UNSPECIFIED (HCC): ICD-10-CM

## 2024-02-15 DIAGNOSIS — E78.2 MIXED HYPERLIPIDEMIA: ICD-10-CM

## 2024-02-15 DIAGNOSIS — E11.9 TYPE 2 DIABETES MELLITUS WITHOUT COMPLICATION, WITHOUT LONG-TERM CURRENT USE OF INSULIN (HCC): Primary | ICD-10-CM

## 2024-02-15 DIAGNOSIS — I10 PRIMARY HYPERTENSION: ICD-10-CM

## 2024-02-15 LAB — HBA1C MFR BLD: 6 %

## 2024-02-15 RX ORDER — PANTOPRAZOLE SODIUM 40 MG/1
40 TABLET, DELAYED RELEASE ORAL DAILY
Status: SHIPPED | COMMUNITY
Start: 2024-02-15

## 2024-02-15 RX ORDER — LEVOTHYROXINE SODIUM 0.15 MG/1
150 TABLET ORAL DAILY
Qty: 90 TABLET | Refills: 1 | Status: SHIPPED | OUTPATIENT
Start: 2024-02-15

## 2024-02-15 SDOH — ECONOMIC STABILITY: FOOD INSECURITY: WITHIN THE PAST 12 MONTHS, THE FOOD YOU BOUGHT JUST DIDN'T LAST AND YOU DIDN'T HAVE MONEY TO GET MORE.: NEVER TRUE

## 2024-02-15 SDOH — ECONOMIC STABILITY: INCOME INSECURITY: HOW HARD IS IT FOR YOU TO PAY FOR THE VERY BASICS LIKE FOOD, HOUSING, MEDICAL CARE, AND HEATING?: NOT HARD AT ALL

## 2024-02-15 SDOH — ECONOMIC STABILITY: FOOD INSECURITY: WITHIN THE PAST 12 MONTHS, YOU WORRIED THAT YOUR FOOD WOULD RUN OUT BEFORE YOU GOT MONEY TO BUY MORE.: NEVER TRUE

## 2024-02-15 ASSESSMENT — PATIENT HEALTH QUESTIONNAIRE - PHQ9
9. THOUGHTS THAT YOU WOULD BE BETTER OFF DEAD, OR OF HURTING YOURSELF: 0
SUM OF ALL RESPONSES TO PHQ QUESTIONS 1-9: 0
5. POOR APPETITE OR OVEREATING: 0
3. TROUBLE FALLING OR STAYING ASLEEP: 0
SUM OF ALL RESPONSES TO PHQ QUESTIONS 1-9: 0
4. FEELING TIRED OR HAVING LITTLE ENERGY: 0
8. MOVING OR SPEAKING SO SLOWLY THAT OTHER PEOPLE COULD HAVE NOTICED. OR THE OPPOSITE, BEING SO FIGETY OR RESTLESS THAT YOU HAVE BEEN MOVING AROUND A LOT MORE THAN USUAL: 0
10. IF YOU CHECKED OFF ANY PROBLEMS, HOW DIFFICULT HAVE THESE PROBLEMS MADE IT FOR YOU TO DO YOUR WORK, TAKE CARE OF THINGS AT HOME, OR GET ALONG WITH OTHER PEOPLE: 0
SUM OF ALL RESPONSES TO PHQ QUESTIONS 1-9: 0
SUM OF ALL RESPONSES TO PHQ9 QUESTIONS 1 & 2: 0
1. LITTLE INTEREST OR PLEASURE IN DOING THINGS: 0
7. TROUBLE CONCENTRATING ON THINGS, SUCH AS READING THE NEWSPAPER OR WATCHING TELEVISION: 0
6. FEELING BAD ABOUT YOURSELF - OR THAT YOU ARE A FAILURE OR HAVE LET YOURSELF OR YOUR FAMILY DOWN: 0
SUM OF ALL RESPONSES TO PHQ QUESTIONS 1-9: 0
2. FEELING DOWN, DEPRESSED OR HOPELESS: 0

## 2024-02-15 NOTE — PROGRESS NOTES
Anxiety : Buspar 10 in the morning and 2 at night.     Physical exam  /66 (Site: Left Upper Arm, Position: Sitting)   Pulse (!) 44   Temp 97.1 °F (36.2 °C) (Temporal)   Resp 16   Ht 1.6 m (5' 3\")   Wt 88.5 kg (195 lb)   SpO2 96%   BMI 34.54 kg/m²     Physical Exam  Constitutional:       General: He is not in acute distress.     Appearance: Normal appearance. He is not ill-appearing.   HENT:      Head: Normocephalic and atraumatic.   Eyes:      Conjunctiva/sclera: Conjunctivae normal.   Neck:      Thyroid: No thyroid mass, thyromegaly or thyroid tenderness.   Cardiovascular:      Rate and Rhythm: Normal rate and regular rhythm.      Pulses: Normal pulses.      Heart sounds: Normal heart sounds. No murmur heard.     No friction rub. No gallop.   Pulmonary:      Effort: Pulmonary effort is normal. No respiratory distress.      Breath sounds: Normal breath sounds. No wheezing.   Abdominal:      General: Abdomen is flat. Bowel sounds are normal. There is no distension.      Palpations: Abdomen is soft.      Tenderness: There is no abdominal tenderness. There is no right CVA tenderness or left CVA tenderness.   Musculoskeletal:      Right lower leg: No edema.      Left lower leg: No edema.   Feet:      Right foot:      Protective Sensation: 10 sites tested.  10 sites sensed.      Left foot:      Protective Sensation:   10 sites sensed.      Comments: Diabetic foot exam:   Left Foot:   Visual Exam: normal   Pulse DP: 2+ (normal)   Filament test: normal sensation   Vibratory Sensation: normal  Right Foot:   Visual Exam: normal   Pulse DP: 2+ (normal)   Filament test: normal sensation   Vibratory Sensation: normal       Skin:     General: Skin is warm and dry.      Capillary Refill: Capillary refill takes less than 2 seconds.   Neurological:      General: No focal deficit present.      Mental Status: He is alert.   Psychiatric:         Mood and Affect: Mood normal.         Behavior: Behavior normal.

## 2024-04-10 ENCOUNTER — OFFICE VISIT (OUTPATIENT)
Dept: URGENT CARE | Age: 69
End: 2024-04-10

## 2024-04-10 VITALS
BODY MASS INDEX: 34.91 KG/M2 | HEIGHT: 63 IN | SYSTOLIC BLOOD PRESSURE: 139 MMHG | TEMPERATURE: 98.4 F | HEART RATE: 41 BPM | OXYGEN SATURATION: 92 % | WEIGHT: 197 LBS | DIASTOLIC BLOOD PRESSURE: 68 MMHG

## 2024-04-10 DIAGNOSIS — S43.402A SPRAIN OF LEFT SHOULDER, UNSPECIFIED SHOULDER SPRAIN TYPE, INITIAL ENCOUNTER: Primary | ICD-10-CM

## 2024-04-10 DIAGNOSIS — S63.502A SPRAIN OF LEFT WRIST, INITIAL ENCOUNTER: ICD-10-CM

## 2024-04-10 DIAGNOSIS — S53.402A SPRAIN OF LEFT ELBOW, INITIAL ENCOUNTER: ICD-10-CM

## 2024-04-10 DIAGNOSIS — I10 PRIMARY HYPERTENSION: ICD-10-CM

## 2024-04-10 RX ORDER — LOSARTAN POTASSIUM 50 MG/1
50 TABLET ORAL DAILY
Qty: 90 TABLET | Refills: 3 | Status: SHIPPED | OUTPATIENT
Start: 2024-04-10

## 2024-04-10 RX ORDER — AMLODIPINE BESYLATE 5 MG/1
5 TABLET ORAL DAILY
Qty: 90 TABLET | Refills: 3 | Status: SHIPPED | OUTPATIENT
Start: 2024-04-10

## 2024-04-10 RX ORDER — PREDNISONE 10 MG/1
10 TABLET ORAL 2 TIMES DAILY
Qty: 10 TABLET | Refills: 0 | Status: SHIPPED | OUTPATIENT
Start: 2024-04-10 | End: 2024-04-15

## 2024-04-10 ASSESSMENT — ENCOUNTER SYMPTOMS
DIARRHEA: 0
SHORTNESS OF BREATH: 0
VOMITING: 0
EYE PAIN: 0

## 2024-04-10 NOTE — PROGRESS NOTES
normal.      Pupils: Pupils are equal, round, and reactive to light.   Cardiovascular:      Rate and Rhythm: Regular rhythm. Bradycardia present.      Pulses: Normal pulses.      Heart sounds: Normal heart sounds.   Pulmonary:      Effort: Pulmonary effort is normal. No tachypnea, bradypnea, accessory muscle usage, prolonged expiration, respiratory distress or retractions. He is not intubated.      Breath sounds: Normal breath sounds and air entry. No stridor, decreased air movement or transmitted upper airway sounds. No decreased breath sounds, wheezing, rhonchi or rales.   Musculoskeletal:      Right shoulder: Normal.      Left shoulder: Tenderness present. No swelling, deformity, effusion, laceration, bony tenderness or crepitus. Normal range of motion. Normal strength. Normal pulse.      Right elbow: Normal.      Left elbow: No swelling, deformity, effusion or lacerations. Normal range of motion. Tenderness present in radial head.      Right wrist: Normal.      Left wrist: Tenderness present. No swelling, deformity, effusion, lacerations, bony tenderness, snuff box tenderness or crepitus. Normal range of motion. Normal pulse.      Cervical back: Normal range of motion.      Comments: Pt has tenderness of the anterior left shoulder, radial aspect of left elbow and anterior aspect of left wrist. No erythema, swelling, bruising, abrasions, lacerations, crepitus, foreign body, rash, numbness to the left fingers. No decreased ROM of left shoulder, elbow and wrist.    Skin:     General: Skin is warm and dry.   Neurological:      General: No focal deficit present.      Mental Status: He is alert and oriented to person, place, and time.   Psychiatric:         Mood and Affect: Mood normal.         Behavior: Behavior normal.         Thought Content: Thought content normal.         Judgment: Judgment normal.         PROCEDURES:  Unless otherwise noted below, none     Procedures    RESULTS:  No results found for this visit

## 2024-04-10 NOTE — PATIENT INSTRUCTIONS
- Pt to drink lots of fluids  - Pt to take medication as prescribed  - Pt ok to take tylenol as needed  - Pt to call if any symptoms worsen or follow up with PCP  - Pt to go to ER if have shortness of breath or chest pain  - Pt to rest left shoulder, elbow and wrist

## 2024-04-10 NOTE — TELEPHONE ENCOUNTER
Future Appointments   Date Time Provider Department Center   5/16/2024  2:20 PM Aicha Bolton MD [facility] [place] [facility]             LOV 2/15/2024

## 2024-05-01 ENCOUNTER — OFFICE VISIT (OUTPATIENT)
Dept: URGENT CARE | Age: 69
End: 2024-05-01

## 2024-05-01 VITALS
HEART RATE: 44 BPM | OXYGEN SATURATION: 96 % | TEMPERATURE: 98.6 F | WEIGHT: 193.6 LBS | HEIGHT: 64 IN | DIASTOLIC BLOOD PRESSURE: 65 MMHG | BODY MASS INDEX: 33.05 KG/M2 | SYSTOLIC BLOOD PRESSURE: 116 MMHG

## 2024-05-01 DIAGNOSIS — L24.9 IRRITANT CONTACT DERMATITIS, UNSPECIFIED TRIGGER: Primary | ICD-10-CM

## 2024-05-01 RX ORDER — MULTIVIT-MIN/IRON/FOLIC ACID/K 18-600-40
CAPSULE ORAL
COMMUNITY

## 2024-05-01 NOTE — PATIENT INSTRUCTIONS
Recommend use of an emollient based creams/lotions (Eucerin, Cetaphil, or Aquaphor) to help with skin healing.  Can use an oral antihistamine to help if any itching develops - Claritin, Zyrtec, Allegra, or a benadryl cream    Follow up with your PCP within 7 days or, if unable, you can return to the clinic if symptoms persist beyond 7 days or if symptoms worsen.

## 2024-05-01 NOTE — PROGRESS NOTES
Aneudy Shields (: 1955) is a 69 y.o. male, Established patient, here for evaluation of the following chief complaint(s):  Rash (2 days. Pt doesn't know if its poison ivy or bug bites. Doesn't itch. Only on legs. )      ASSESSMENT/PLAN:    ICD-10-CM    1. Irritant contact dermatitis, unspecified trigger  L24.9           Exam of the rash appears maculopapular and is not pruritic - appears to be contact dermatitis due to noted recent time spent kneeling in the dirt/grass in a wooded area, which is further strengthened by the distribution of the rash between the ankles and the knees, which the pt notes was between where the tops of his socks, and the bottoms of his shorts ended.  Low concerns for cellulitis, petechiae, allergic urticaria, SJS, lacerations, abrasions, burns, and insect bites/stings.   Bradycardia noted on vitals and during exam - given pt's past history of previously noted bradycardia, thought likely pt's baseline and not concerning to the pt's rash for which he presents with in clinic today.  Given non-pruritic nature of the rash, and given the pt's history of nephritis, steroid treatment, oral, as well as topical, was thought inappropriate at this time.  Recommended topical applications of emollient based creams to help soothe the skin.  Oral antihistamines should any itching develop - can also trial topical benadryl/antihistamine creams over the areas of itch.    Discussed PCP follow up for persisting or worsening symptoms, or to return to the clinic if unable to obtain PCP follow up for worsening symptoms.  The patient tolerated their visit well. The patient and/or the family were informed of the results of any tests, a time was given to answer questions, a plan was proposed and they agreed with plan. Reviewed AVS with treatment instructions and answered questions - pt/family expresses understanding and agreement with the discussed treatment plan and AVS

## 2024-05-19 SDOH — HEALTH STABILITY: PHYSICAL HEALTH
ON AVERAGE, HOW MANY DAYS PER WEEK DO YOU ENGAGE IN MODERATE TO STRENUOUS EXERCISE (LIKE A BRISK WALK)?: PATIENT DECLINED

## 2024-05-19 ASSESSMENT — PATIENT HEALTH QUESTIONNAIRE - PHQ9
SUM OF ALL RESPONSES TO PHQ QUESTIONS 1-9: 1
10. IF YOU CHECKED OFF ANY PROBLEMS, HOW DIFFICULT HAVE THESE PROBLEMS MADE IT FOR YOU TO DO YOUR WORK, TAKE CARE OF THINGS AT HOME, OR GET ALONG WITH OTHER PEOPLE: NOT DIFFICULT AT ALL
2. FEELING DOWN, DEPRESSED OR HOPELESS: SEVERAL DAYS
4. FEELING TIRED OR HAVING LITTLE ENERGY: NOT AT ALL
9. THOUGHTS THAT YOU WOULD BE BETTER OFF DEAD, OR OF HURTING YOURSELF: NOT AT ALL
6. FEELING BAD ABOUT YOURSELF - OR THAT YOU ARE A FAILURE OR HAVE LET YOURSELF OR YOUR FAMILY DOWN: NOT AT ALL
3. TROUBLE FALLING OR STAYING ASLEEP: NOT AT ALL
SUM OF ALL RESPONSES TO PHQ QUESTIONS 1-9: 1
SUM OF ALL RESPONSES TO PHQ QUESTIONS 1-9: 1
7. TROUBLE CONCENTRATING ON THINGS, SUCH AS READING THE NEWSPAPER OR WATCHING TELEVISION: NOT AT ALL
8. MOVING OR SPEAKING SO SLOWLY THAT OTHER PEOPLE COULD HAVE NOTICED. OR THE OPPOSITE, BEING SO FIGETY OR RESTLESS THAT YOU HAVE BEEN MOVING AROUND A LOT MORE THAN USUAL: NOT AT ALL
5. POOR APPETITE OR OVEREATING: NOT AT ALL
1. LITTLE INTEREST OR PLEASURE IN DOING THINGS: NOT AT ALL
SUM OF ALL RESPONSES TO PHQ QUESTIONS 1-9: 1
SUM OF ALL RESPONSES TO PHQ9 QUESTIONS 1 & 2: 1

## 2024-05-19 ASSESSMENT — LIFESTYLE VARIABLES
HOW OFTEN DO YOU HAVE A DRINK CONTAINING ALCOHOL: 1
HOW MANY STANDARD DRINKS CONTAINING ALCOHOL DO YOU HAVE ON A TYPICAL DAY: PATIENT DOES NOT DRINK
HOW OFTEN DO YOU HAVE SIX OR MORE DRINKS ON ONE OCCASION: 1
HOW OFTEN DO YOU HAVE A DRINK CONTAINING ALCOHOL: NEVER
HOW MANY STANDARD DRINKS CONTAINING ALCOHOL DO YOU HAVE ON A TYPICAL DAY: 0

## 2024-05-21 ENCOUNTER — OFFICE VISIT (OUTPATIENT)
Dept: FAMILY MEDICINE CLINIC | Age: 69
End: 2024-05-21

## 2024-05-21 VITALS
HEART RATE: 52 BPM | HEIGHT: 63 IN | RESPIRATION RATE: 16 BRPM | SYSTOLIC BLOOD PRESSURE: 120 MMHG | BODY MASS INDEX: 34.02 KG/M2 | TEMPERATURE: 97.2 F | WEIGHT: 192 LBS | DIASTOLIC BLOOD PRESSURE: 60 MMHG | OXYGEN SATURATION: 97 %

## 2024-05-21 DIAGNOSIS — I10 PRIMARY HYPERTENSION: ICD-10-CM

## 2024-05-21 DIAGNOSIS — F31.60 BIPOLAR AFFECTIVE DISORDER, CURRENT EPISODE MIXED, CURRENT EPISODE SEVERITY UNSPECIFIED (HCC): ICD-10-CM

## 2024-05-21 DIAGNOSIS — E03.9 ACQUIRED HYPOTHYROIDISM: ICD-10-CM

## 2024-05-21 DIAGNOSIS — Z12.5 SCREENING PSA (PROSTATE SPECIFIC ANTIGEN): ICD-10-CM

## 2024-05-21 DIAGNOSIS — E78.2 MIXED HYPERLIPIDEMIA: ICD-10-CM

## 2024-05-21 DIAGNOSIS — E11.22 TYPE 2 DIABETES MELLITUS WITH CHRONIC KIDNEY DISEASE, WITHOUT LONG-TERM CURRENT USE OF INSULIN, UNSPECIFIED CKD STAGE (HCC): ICD-10-CM

## 2024-05-21 DIAGNOSIS — R35.1 NOCTURIA: ICD-10-CM

## 2024-05-21 DIAGNOSIS — Z00.00 MEDICARE ANNUAL WELLNESS VISIT, SUBSEQUENT: Primary | ICD-10-CM

## 2024-05-21 DIAGNOSIS — K21.9 GASTROESOPHAGEAL REFLUX DISEASE WITHOUT ESOPHAGITIS: ICD-10-CM

## 2024-05-21 NOTE — PATIENT INSTRUCTIONS
. Annual physical exam  -Recommend 150 minutes of cardiovascular activity a week, or 10,000 to 15,000 steps a day, 2 days of weightbearing  -Encourage healthy diet,avoid processed/refined carbohydrates     Labs today   Health Maintenance Due   Topic Date Due    Shingles vaccine (2 of 3) 06/28/2011    Respiratory Syncytial Virus (RSV) Pregnant or age 60 yrs+ (1 - 1-dose 60+ series) Never done    Pneumococcal 65+ years Vaccine (2 of 2 - PCV) 07/27/2021    COVID-19 Vaccine (4 - 2023-24 season) 09/01/2023          A Healthy Heart: Care Instructions  Overview     Coronary artery disease, also called heart disease, occurs when a substance called plaque builds up in the vessels that supply oxygen-rich blood to your heart muscle. This can narrow the blood vessels and reduce blood flow. A heart attack happens when blood flow is completely blocked. A high-fat diet, smoking, and other factors increase the risk of heart disease.  Your doctor has found that you have a chance of having heart disease. A heart-healthy lifestyle can help keep your heart healthy and prevent heart disease. This lifestyle includes eating healthy, being active, staying at a weight that's healthy for you, and not smoking or using tobacco. It also includes taking medicines as directed, managing other health conditions, and trying to get a healthy amount of sleep.  Follow-up care is a key part of your treatment and safety. Be sure to make and go to all appointments, and call your doctor if you are having problems. It's also a good idea to know your test results and keep a list of the medicines you take.  How can you care for yourself at home?  Diet    Use less salt when you cook and eat. This helps lower your blood pressure. Taste food before salting. Add only a little salt when you think you need it. With time, your taste buds will adjust to less salt.     Eat fewer snack items, fast foods, canned soups, and other high-salt, high-fat, processed foods.

## 2024-05-21 NOTE — PROGRESS NOTES
([place] [facility]); Future  Nocturia  -     PSA, Prostatic Specific Antigen ([place] [facility]); Future                     Return in 6 months (on 11/21/2024).     Subjective   The following acute and/or chronic problems were also addressed today:    Patient is a 69 y.o. male  has a past medical history of Allergic rhinitis, Anxiety, Arthritis, Bipolar Disorder, Chronic kidney disease, Depression, Diabetes mellitus (HCC), GERD (gastroesophageal reflux disease), Hearing loss, HYPERCHOLESTERAEMIA, Hypertension, Hypothyroid, Kidney disease, Nephritis, OCD (obsessive compulsive disorder), Osteoarthritis, PTSD, Screening PSA (prostate specific antigen), and Sleep apnea. who presents for AWV    Type 2 diabetes  Diet controlled.  Most recent A1c 6.1 % (4/2024 - home reading) 6.0% (2/15/2024)6.7% 11/18/23.Checks blood sugars at home 110 fasting. Not taking any diabetes medications.  No numbnes or tingling in the feet. No polyuria, polyphagia or polyuria,  Eye exam November 20/25   Diet: Salads, Brisket, baked beans, gaytan no nitrates   HPL : On simvastatin 40 mg     Hypothyroidism:  Takes levothyroxine 150 mcg daily.  Denies weight changes, change in bowel pattern, heat/ cold intolerance, dyspnea on exertion, irregular heart rate or worsening tremor.     Hypertension:Taking losartan 50 mg daily and amlodipine 5mg daily in the morning.   No recent lightheadedness, headaches, blurred vision, chest pain, orthopnea, palpitations, or peripheral edema.   History of sleep apnea not on CPAP.     Anxiety : Buspar 10 in the morning and 2 at night and Pristiq 50 mg daily fluvoxaMINE (LUVOX) 100 MG   GERD: Protonix 40mg daily   Exercise: Walks dog, 3 times a  week, 30- 45 minutes.  Diet changes: shredded wheat, sausage , arbys , salmon , raisin bread , brocolli , aspargus, cookies     Social History  Lives with: wife , 1 dog   Smoker: Never   Alcohol use: Never   Drug use: Never   Exercise: walking daily,   Last eye exam: last year ,   Last

## 2024-05-22 DIAGNOSIS — E87.5 HYPERKALEMIA: Primary | ICD-10-CM

## 2024-05-22 LAB
ALBUMIN SERPL-MCNC: 4.3 G/DL (ref 3.4–5)
ALBUMIN/GLOB SERPL: 1.8 {RATIO} (ref 1.1–2.2)
ALP SERPL-CCNC: 83 U/L (ref 40–129)
ALT SERPL-CCNC: 22 U/L (ref 10–40)
ANION GAP SERPL CALCULATED.3IONS-SCNC: 11 MMOL/L (ref 3–16)
AST SERPL-CCNC: 15 U/L (ref 15–37)
BASOPHILS # BLD: 0 K/UL (ref 0–0.2)
BASOPHILS NFR BLD: 0.4 %
BILIRUB SERPL-MCNC: 0.4 MG/DL (ref 0–1)
BUN SERPL-MCNC: 33 MG/DL (ref 7–20)
CALCIUM SERPL-MCNC: 9.7 MG/DL (ref 8.3–10.6)
CHLORIDE SERPL-SCNC: 105 MMOL/L (ref 99–110)
CO2 SERPL-SCNC: 28 MMOL/L (ref 21–32)
CREAT SERPL-MCNC: 1.3 MG/DL (ref 0.8–1.3)
DEPRECATED RDW RBC AUTO: 14.4 % (ref 12.4–15.4)
EOSINOPHIL # BLD: 0.1 K/UL (ref 0–0.6)
EOSINOPHIL NFR BLD: 2 %
GFR SERPLBLD CREATININE-BSD FMLA CKD-EPI: 59 ML/MIN/{1.73_M2}
GLUCOSE SERPL-MCNC: 115 MG/DL (ref 70–99)
HCT VFR BLD AUTO: 40.8 % (ref 40.5–52.5)
HGB BLD-MCNC: 13.4 G/DL (ref 13.5–17.5)
LYMPHOCYTES # BLD: 1.5 K/UL (ref 1–5.1)
LYMPHOCYTES NFR BLD: 21.3 %
MCH RBC QN AUTO: 28.4 PG (ref 26–34)
MCHC RBC AUTO-ENTMCNC: 32.9 G/DL (ref 31–36)
MCV RBC AUTO: 86.4 FL (ref 80–100)
MONOCYTES # BLD: 0.6 K/UL (ref 0–1.3)
MONOCYTES NFR BLD: 8.7 %
NEUTROPHILS # BLD: 4.6 K/UL (ref 1.7–7.7)
NEUTROPHILS NFR BLD: 67.6 %
PLATELET # BLD AUTO: 179 K/UL (ref 135–450)
PMV BLD AUTO: 10.5 FL (ref 5–10.5)
POTASSIUM SERPL-SCNC: 5.8 MMOL/L (ref 3.5–5.1)
PROT SERPL-MCNC: 6.7 G/DL (ref 6.4–8.2)
PSA SERPL DL<=0.01 NG/ML-MCNC: 2.14 NG/ML (ref 0–4)
RBC # BLD AUTO: 4.72 M/UL (ref 4.2–5.9)
SODIUM SERPL-SCNC: 144 MMOL/L (ref 136–145)
TSH SERPL DL<=0.005 MIU/L-ACNC: 2.41 UIU/ML (ref 0.27–4.2)
WBC # BLD AUTO: 6.8 K/UL (ref 4–11)

## 2024-07-16 DIAGNOSIS — E78.2 MIXED HYPERLIPIDEMIA: ICD-10-CM

## 2024-07-16 DIAGNOSIS — E03.9 ACQUIRED HYPOTHYROIDISM: ICD-10-CM

## 2024-07-17 RX ORDER — LEVOTHYROXINE SODIUM 0.15 MG/1
150 TABLET ORAL DAILY
Qty: 90 TABLET | Refills: 3 | Status: SHIPPED | OUTPATIENT
Start: 2024-07-17

## 2024-07-17 RX ORDER — SIMVASTATIN 40 MG
40 TABLET ORAL NIGHTLY
Qty: 90 TABLET | Refills: 3 | Status: SHIPPED | OUTPATIENT
Start: 2024-07-17

## 2024-07-17 NOTE — TELEPHONE ENCOUNTER
Levothyroxine   LAST REFILL 01/30/2024  AMOUNT 90   1 REFILLS  LAST VISIT 5/21/2024    NEXT VISIT 08/21/2024

## 2024-08-21 ENCOUNTER — OFFICE VISIT (OUTPATIENT)
Dept: FAMILY MEDICINE CLINIC | Age: 69
End: 2024-08-21
Payer: MEDICARE

## 2024-08-21 VITALS
DIASTOLIC BLOOD PRESSURE: 53 MMHG | RESPIRATION RATE: 16 BRPM | BODY MASS INDEX: 35.07 KG/M2 | SYSTOLIC BLOOD PRESSURE: 123 MMHG | WEIGHT: 198 LBS | TEMPERATURE: 97.7 F | OXYGEN SATURATION: 95 % | HEART RATE: 45 BPM

## 2024-08-21 DIAGNOSIS — F31.60 BIPOLAR AFFECTIVE DISORDER, CURRENT EPISODE MIXED, CURRENT EPISODE SEVERITY UNSPECIFIED (HCC): ICD-10-CM

## 2024-08-21 DIAGNOSIS — N18.31 STAGE 3A CHRONIC KIDNEY DISEASE (HCC): ICD-10-CM

## 2024-08-21 DIAGNOSIS — I10 PRIMARY HYPERTENSION: ICD-10-CM

## 2024-08-21 DIAGNOSIS — K21.9 GASTROESOPHAGEAL REFLUX DISEASE WITHOUT ESOPHAGITIS: ICD-10-CM

## 2024-08-21 DIAGNOSIS — E78.2 MIXED HYPERLIPIDEMIA: ICD-10-CM

## 2024-08-21 DIAGNOSIS — E03.9 ACQUIRED HYPOTHYROIDISM: ICD-10-CM

## 2024-08-21 DIAGNOSIS — E11.22 TYPE 2 DIABETES MELLITUS WITH CHRONIC KIDNEY DISEASE, WITHOUT LONG-TERM CURRENT USE OF INSULIN, UNSPECIFIED CKD STAGE (HCC): Primary | ICD-10-CM

## 2024-08-21 LAB — HBA1C MFR BLD: 6.4 %

## 2024-08-21 PROCEDURE — 1036F TOBACCO NON-USER: CPT | Performed by: STUDENT IN AN ORGANIZED HEALTH CARE EDUCATION/TRAINING PROGRAM

## 2024-08-21 PROCEDURE — 1123F ACP DISCUSS/DSCN MKR DOCD: CPT | Performed by: STUDENT IN AN ORGANIZED HEALTH CARE EDUCATION/TRAINING PROGRAM

## 2024-08-21 PROCEDURE — 3017F COLORECTAL CA SCREEN DOC REV: CPT | Performed by: STUDENT IN AN ORGANIZED HEALTH CARE EDUCATION/TRAINING PROGRAM

## 2024-08-21 PROCEDURE — G8417 CALC BMI ABV UP PARAM F/U: HCPCS | Performed by: STUDENT IN AN ORGANIZED HEALTH CARE EDUCATION/TRAINING PROGRAM

## 2024-08-21 PROCEDURE — 99214 OFFICE O/P EST MOD 30 MIN: CPT | Performed by: STUDENT IN AN ORGANIZED HEALTH CARE EDUCATION/TRAINING PROGRAM

## 2024-08-21 PROCEDURE — G8427 DOCREV CUR MEDS BY ELIG CLIN: HCPCS | Performed by: STUDENT IN AN ORGANIZED HEALTH CARE EDUCATION/TRAINING PROGRAM

## 2024-08-21 PROCEDURE — 3044F HG A1C LEVEL LT 7.0%: CPT | Performed by: STUDENT IN AN ORGANIZED HEALTH CARE EDUCATION/TRAINING PROGRAM

## 2024-08-21 PROCEDURE — 2022F DILAT RTA XM EVC RTNOPTHY: CPT | Performed by: STUDENT IN AN ORGANIZED HEALTH CARE EDUCATION/TRAINING PROGRAM

## 2024-08-21 PROCEDURE — 3078F DIAST BP <80 MM HG: CPT | Performed by: STUDENT IN AN ORGANIZED HEALTH CARE EDUCATION/TRAINING PROGRAM

## 2024-08-21 PROCEDURE — 3074F SYST BP LT 130 MM HG: CPT | Performed by: STUDENT IN AN ORGANIZED HEALTH CARE EDUCATION/TRAINING PROGRAM

## 2024-08-21 PROCEDURE — 83036 HEMOGLOBIN GLYCOSYLATED A1C: CPT | Performed by: STUDENT IN AN ORGANIZED HEALTH CARE EDUCATION/TRAINING PROGRAM

## 2024-08-21 PROCEDURE — G2211 COMPLEX E/M VISIT ADD ON: HCPCS | Performed by: STUDENT IN AN ORGANIZED HEALTH CARE EDUCATION/TRAINING PROGRAM

## 2024-08-21 RX ORDER — OMEGA-3S/DHA/EPA/FISH OIL/D3 300MG-1000
400 CAPSULE ORAL DAILY
COMMUNITY

## 2024-08-21 SDOH — ECONOMIC STABILITY: FOOD INSECURITY: WITHIN THE PAST 12 MONTHS, THE FOOD YOU BOUGHT JUST DIDN'T LAST AND YOU DIDN'T HAVE MONEY TO GET MORE.: NEVER TRUE

## 2024-08-21 SDOH — ECONOMIC STABILITY: FOOD INSECURITY: WITHIN THE PAST 12 MONTHS, YOU WORRIED THAT YOUR FOOD WOULD RUN OUT BEFORE YOU GOT MONEY TO BUY MORE.: NEVER TRUE

## 2024-08-21 SDOH — ECONOMIC STABILITY: INCOME INSECURITY: HOW HARD IS IT FOR YOU TO PAY FOR THE VERY BASICS LIKE FOOD, HOUSING, MEDICAL CARE, AND HEATING?: NOT HARD AT ALL

## 2024-08-21 ASSESSMENT — PATIENT HEALTH QUESTIONNAIRE - PHQ9
SUM OF ALL RESPONSES TO PHQ QUESTIONS 1-9: 6
1. LITTLE INTEREST OR PLEASURE IN DOING THINGS: NEARLY EVERY DAY
6. FEELING BAD ABOUT YOURSELF - OR THAT YOU ARE A FAILURE OR HAVE LET YOURSELF OR YOUR FAMILY DOWN: NOT AT ALL
SUM OF ALL RESPONSES TO PHQ QUESTIONS 1-9: 6
SUM OF ALL RESPONSES TO PHQ9 QUESTIONS 1 & 2: 6
3. TROUBLE FALLING OR STAYING ASLEEP: NOT AT ALL
2. FEELING DOWN, DEPRESSED OR HOPELESS: NEARLY EVERY DAY
10. IF YOU CHECKED OFF ANY PROBLEMS, HOW DIFFICULT HAVE THESE PROBLEMS MADE IT FOR YOU TO DO YOUR WORK, TAKE CARE OF THINGS AT HOME, OR GET ALONG WITH OTHER PEOPLE: SOMEWHAT DIFFICULT
SUM OF ALL RESPONSES TO PHQ QUESTIONS 1-9: 6
4. FEELING TIRED OR HAVING LITTLE ENERGY: NOT AT ALL
7. TROUBLE CONCENTRATING ON THINGS, SUCH AS READING THE NEWSPAPER OR WATCHING TELEVISION: NOT AT ALL
8. MOVING OR SPEAKING SO SLOWLY THAT OTHER PEOPLE COULD HAVE NOTICED. OR THE OPPOSITE, BEING SO FIGETY OR RESTLESS THAT YOU HAVE BEEN MOVING AROUND A LOT MORE THAN USUAL: NOT AT ALL
5. POOR APPETITE OR OVEREATING: NOT AT ALL
9. THOUGHTS THAT YOU WOULD BE BETTER OFF DEAD, OR OF HURTING YOURSELF: NOT AT ALL
SUM OF ALL RESPONSES TO PHQ QUESTIONS 1-9: 6

## 2024-08-21 NOTE — PROGRESS NOTES
[facility]  [address]  [address]  [place], [place] [address]  Tel: [phone]      2024   SUBJECTIVE/OBJECTIVE  HPI    Aneudy Shields (:  1955) is a 69 y.o. male, here for evaluation of the following medical concerns:  Chief Complaint   Patient presents with    Diabetes     Patient is a 69 y.o. male  has a past medical history of Allergic rhinitis, Anxiety, Arthritis, Bipolar Disorder, Chronic kidney disease, Depression, Diabetes mellitus (HCC), GERD (gastroesophageal reflux disease), Hearing loss, HYPERCHOLESTERAEMIA, Hypertension, Hypothyroid, Kidney disease, Nephritis, OCD (obsessive compulsive disorder), Osteoarthritis, PTSD, Screening PSA (prostate specific antigen), and Sleep apnea. who presents for AWV     Type 2 diabetes  Diet controlled.  Most recent A1c 6.4 % ()6.1 % (2024 - home reading) 6.0% (2/15/2024)6.7% 23.Checks blood sugars at home 120-140 fasting. Not taking any diabetes medications.  No numbnes or tingling in the feet. No polyphagia or polydipsia. Endorses polyuria.  Diet: Salads, apples , strawberries, baked beans, gaytan no nitrates , windmill cookie . Limits sweets   Exercise: Walking his dog   HPL : On simvastatin 40 mg      Hypothyroidism:  Takes levothyroxine 150 mcg daily.  Denies weight changes, change in bowel pattern, heat/ cold intolerance, dyspnea on exertion, irregular heart rate or worsening tremor.      Hypertension:Taking losartan 50 mg daily and amlodipine 5mg daily in the morning. Endorses feeling aggravated, not checking blood pressures.    No recent lightheadedness, headaches, blurred vision, chest pain, orthopnea, palpitations, or peripheral edema.   History of sleep apnea not on CPAP.      Anxiety : Buspar 10 in the morning and 2 at night and Pristiq 50 mg daily, fluvoxaMINE (LUVOX) 100 MG . Sees psychiatry nex t week   GERD: Protonix 40mg daily        Review of Systems   Constitutional:  Negative for chills,

## 2024-08-30 DIAGNOSIS — E87.5 HYPERKALEMIA: ICD-10-CM

## 2024-08-31 LAB
ANION GAP SERPL CALCULATED.3IONS-SCNC: 9 MMOL/L (ref 3–16)
BUN SERPL-MCNC: 30 MG/DL (ref 7–20)
CALCIUM SERPL-MCNC: 9.1 MG/DL (ref 8.3–10.6)
CHLORIDE SERPL-SCNC: 104 MMOL/L (ref 99–110)
CO2 SERPL-SCNC: 26 MMOL/L (ref 21–32)
CREAT SERPL-MCNC: 1.3 MG/DL (ref 0.8–1.3)
GFR SERPLBLD CREATININE-BSD FMLA CKD-EPI: 59 ML/MIN/{1.73_M2}
GLUCOSE SERPL-MCNC: 164 MG/DL (ref 70–99)
POTASSIUM SERPL-SCNC: 4.8 MMOL/L (ref 3.5–5.1)
SODIUM SERPL-SCNC: 139 MMOL/L (ref 136–145)

## 2024-09-03 ENCOUNTER — TELEPHONE (OUTPATIENT)
Dept: FAMILY MEDICINE CLINIC | Age: 69
End: 2024-09-03

## 2024-09-03 DIAGNOSIS — Z79.2 NEED FOR ANTIBIOTIC PROPHYLAXIS FOR DENTAL PROCEDURE: ICD-10-CM

## 2024-09-03 RX ORDER — AMOXICILLIN 500 MG/1
CAPSULE ORAL
Qty: 4 CAPSULE | Refills: 0 | Status: SHIPPED | OUTPATIENT
Start: 2024-09-03

## 2024-09-03 NOTE — TELEPHONE ENCOUNTER
Patient wanted to let Dr. Bolton know that he will continue to increase water intake, but he reports his BUN has decreased from 42 since his last visit with his nephrologist 4 months ago. SARAH

## 2024-09-03 NOTE — TELEPHONE ENCOUNTER
Patient called back for his labs requesting amoxicillin sent into the pharmacy for dental procedure for September 12th.

## 2024-09-15 ENCOUNTER — OFFICE VISIT (OUTPATIENT)
Dept: URGENT CARE | Age: 69
End: 2024-09-15

## 2024-09-15 VITALS
HEART RATE: 42 BPM | HEIGHT: 64 IN | DIASTOLIC BLOOD PRESSURE: 75 MMHG | TEMPERATURE: 98.4 F | SYSTOLIC BLOOD PRESSURE: 120 MMHG | WEIGHT: 196 LBS | OXYGEN SATURATION: 92 % | BODY MASS INDEX: 33.46 KG/M2

## 2024-09-15 DIAGNOSIS — J02.9 ACUTE PHARYNGITIS, UNSPECIFIED ETIOLOGY: Primary | ICD-10-CM

## 2024-09-15 RX ORDER — METHYLPREDNISOLONE 4 MG
TABLET, DOSE PACK ORAL
Qty: 1 KIT | Refills: 0 | Status: SHIPPED | OUTPATIENT
Start: 2024-09-15

## 2024-10-01 ENCOUNTER — TELEPHONE (OUTPATIENT)
Dept: FAMILY MEDICINE CLINIC | Age: 69
End: 2024-10-01

## 2024-10-01 RX ORDER — AMOXICILLIN 500 MG/1
CAPSULE ORAL
Qty: 32 CAPSULE | Refills: 0 | Status: SHIPPED | OUTPATIENT
Start: 2024-10-01

## 2024-10-01 NOTE — TELEPHONE ENCOUNTER
[id number] - [place], [place] - [address] - P [phone] - F [phone] [[id number]]       amoxicillin (AMOXIL) 500 MG capsule [[id number]]     8/21/2024     Future Appointments   Date Time Provider Department Center   2/21/2025 12:00 PM Aicha Bolton MD [facility]    Patient having gum surgery tomorrow and then in another 2 weeks he will be going back to the dentist for a follow up. Patient asking for 8 doses.

## 2024-12-16 ENCOUNTER — OFFICE VISIT (OUTPATIENT)
Dept: URGENT CARE | Age: 69
End: 2024-12-16

## 2024-12-16 VITALS
OXYGEN SATURATION: 94 % | HEART RATE: 52 BPM | SYSTOLIC BLOOD PRESSURE: 126 MMHG | BODY MASS INDEX: 34.67 KG/M2 | TEMPERATURE: 98.8 F | DIASTOLIC BLOOD PRESSURE: 76 MMHG | WEIGHT: 202 LBS

## 2024-12-16 DIAGNOSIS — M54.12 CERVICAL RADICULOPATHY: ICD-10-CM

## 2024-12-16 DIAGNOSIS — S16.1XXA STRAIN OF NECK MUSCLE, INITIAL ENCOUNTER: Primary | ICD-10-CM

## 2024-12-16 DIAGNOSIS — M25.511 ACUTE PAIN OF RIGHT SHOULDER: ICD-10-CM

## 2024-12-16 PROBLEM — M43.16 SPONDYLOLISTHESIS OF LUMBAR REGION: Status: ACTIVE | Noted: 2024-12-16

## 2024-12-16 PROBLEM — I44.1 MOBITZ (TYPE) I (WENCKEBACH'S) ATRIOVENTRICULAR BLOCK: Status: ACTIVE | Noted: 2024-02-05

## 2024-12-16 PROBLEM — M48.10 ANKYLOSING HYPEROSTOSIS: Status: ACTIVE | Noted: 2024-12-16

## 2024-12-16 PROBLEM — Z47.1 AFTERCARE FOLLOWING JOINT REPLACEMENT SURGERY: Status: RESOLVED | Noted: 2024-12-16 | Resolved: 2024-12-16

## 2024-12-16 RX ORDER — TRIAMCINOLONE ACETONIDE 1 MG/G
CREAM TOPICAL PRN
COMMUNITY
Start: 2024-05-07

## 2024-12-16 RX ORDER — METHYLPREDNISOLONE 4 MG/1
TABLET ORAL
Qty: 1 KIT | Refills: 0 | Status: SHIPPED | OUTPATIENT
Start: 2024-12-16

## 2024-12-16 ASSESSMENT — VISUAL ACUITY: OU: 1

## 2024-12-17 NOTE — PATIENT INSTRUCTIONS
Medrol dose pack prescribed for treatment of the inflammation/impingement  Muscle relaxer (Tizanidine) provided for noted muscle spasms on exam. Recommend taking them at night, and if staying at home as they can cause fatigue. Do not take them before driving or if working heavy machinery.  Do not take with your Fluvoxamine, if taken together, they can decrease your blood pressure to dangerous levels.  You have been prescribed medication, tizanidine, that causes drowsiness. While this is not a problem under normal circumstances, when taken with alcohol or while driving or operating heavy machinery, this medicine could cause you to become too sleepy and/or hurt yourself or others. Therefore, it is very important that you DO NOT DRIVE, DRINK ALCOHOL, OR OPERATE HEAVY MACHINERY WHILE TAKING THE MEDICINE(S) LISTED ABOVE.  Acetaminophen (tylenol) for pain management while taking steroids  Hot compresses over the base of the neck for 15-20 minutes, with at least a 1 hour break in between applications, several times per day to help with pain.     If you develop numbness or loss of bowel or bladder function (can not hold your urine or stool) follow up immediately with the ER for further evaluation due to concerns for a serious spine complication.    New Prescriptions    METHYLPREDNISOLONE (MEDROL DOSEPACK) 4 MG TABLET    Take by mouth.    TIZANIDINE (ZANAFLEX) 4 MG TABLET    Take 1 tablet by mouth 3 times daily as needed (muscle spasms)

## 2024-12-17 NOTE — PROGRESS NOTES
Aneudy Shields (: 1955) is a 69 y.o. male, Established patient, here for evaluation of the following chief complaint(s):  pulled muscle (Pulled muscles in chest, shoulder and neck last week )      ASSESSMENT/PLAN:    ICD-10-CM    1. Strain of neck muscle, initial encounter  S16.1XXA tiZANidine (ZANAFLEX) 4 MG tablet      2. Cervical radiculopathy  M54.12 methylPREDNISolone (MEDROL DOSEPACK) 4 MG tablet      3. Acute pain of right shoulder  M25.511           - Cervical neck muscle strain with radiculopathy:  Given recurrent history of past back issues, and with current shooting pains and tingling sensations down the right arm and upper chest, all after a history of  moving heavy furniture, there are concerns for a muscle strain with radiculopathy.  Low concern for varicella zoster, AAA or aortic dissection, epidural abscess, spinal fracture, UTI, kidney stones, and cauda equina syndrome  No bowel/bladder incontinence or perianal numbness, thus suspicion for acute cord compression was very low.   Medrol dose pack prescribed for treatment of the inflammation/impingement  Muscle relaxer (tizanidine) provided for noted muscle spasms on exam. Warning against medication induced drowsiness provided.  Discussed holding his Fluvoxamine due to concerns for possible decrease in BP when taken together.  Acetaminophen (tylenol) for pain management while taking steroids  Recommended intermittent warm compresses to help with discomfort.  Recommended Orthopedics follow up for any persistent or worsening symptoms.  Strict ED follow up instructions provided.    Discussed PCP follow up for persisting or worsening symptoms, or to return to the clinic if unable to obtain PCP follow up for worsening symptoms.    The patient tolerated their visit well. The patient and/or the family were informed of the results of any tests, a time was given to answer questions, a plan was proposed and they agreed with plan. Reviewed AVS with

## 2025-01-05 ENCOUNTER — OFFICE VISIT (OUTPATIENT)
Dept: URGENT CARE | Age: 70
End: 2025-01-05

## 2025-01-05 VITALS
HEART RATE: 47 BPM | SYSTOLIC BLOOD PRESSURE: 138 MMHG | DIASTOLIC BLOOD PRESSURE: 66 MMHG | OXYGEN SATURATION: 95 % | WEIGHT: 205 LBS | BODY MASS INDEX: 36.32 KG/M2 | HEIGHT: 63 IN | TEMPERATURE: 98.4 F

## 2025-01-05 DIAGNOSIS — S29.011A MUSCLE STRAIN OF CHEST WALL, INITIAL ENCOUNTER: Primary | ICD-10-CM

## 2025-01-05 DIAGNOSIS — M62.830 SPASM OF THORACIC BACK MUSCLE: ICD-10-CM

## 2025-01-05 DIAGNOSIS — R09.81 NASAL CONGESTION: ICD-10-CM

## 2025-01-05 DIAGNOSIS — J06.9 VIRAL URI: ICD-10-CM

## 2025-01-05 DIAGNOSIS — R05.1 ACUTE COUGH: ICD-10-CM

## 2025-01-05 DIAGNOSIS — M54.14 THORACIC RADICULOPATHY: ICD-10-CM

## 2025-01-05 DIAGNOSIS — R09.82 POSTNASAL DRIP: ICD-10-CM

## 2025-01-05 RX ORDER — DEXTROMETHORPHAN HBR AND PYRILAMINE MALEATE 30; 30 MG/1; MG/1
1 TABLET ORAL 4 TIMES DAILY PRN
Qty: 40 TABLET | Refills: 0 | Status: SHIPPED | OUTPATIENT
Start: 2025-01-05

## 2025-01-05 RX ORDER — METHYLPREDNISOLONE 4 MG/1
TABLET ORAL
Qty: 1 KIT | Refills: 0 | Status: SHIPPED | OUTPATIENT
Start: 2025-01-05

## 2025-01-05 RX ORDER — GUAIFENESIN 600 MG/1
600 TABLET, EXTENDED RELEASE ORAL 2 TIMES DAILY
Qty: 20 TABLET | Refills: 0 | Status: SHIPPED | OUTPATIENT
Start: 2025-01-05 | End: 2025-01-15

## 2025-01-27 ENCOUNTER — OFFICE VISIT (OUTPATIENT)
Dept: URGENT CARE | Age: 70
End: 2025-01-27

## 2025-01-27 VITALS
HEART RATE: 83 BPM | SYSTOLIC BLOOD PRESSURE: 136 MMHG | HEIGHT: 63 IN | TEMPERATURE: 97.9 F | BODY MASS INDEX: 35.44 KG/M2 | WEIGHT: 200 LBS | OXYGEN SATURATION: 94 % | DIASTOLIC BLOOD PRESSURE: 68 MMHG

## 2025-01-27 DIAGNOSIS — B02.9 HERPES ZOSTER WITHOUT COMPLICATION: Primary | ICD-10-CM

## 2025-01-27 PROBLEM — M54.50 LOW BACK PAIN: Status: ACTIVE | Noted: 2022-02-01

## 2025-01-27 RX ORDER — KETOCONAZOLE 20 MG/ML
SHAMPOO, SUSPENSION TOPICAL
COMMUNITY
Start: 2025-01-10

## 2025-01-27 RX ORDER — DOXYCYCLINE 50 MG/1
TABLET ORAL
COMMUNITY
Start: 2025-01-22

## 2025-01-27 SDOH — ECONOMIC STABILITY: FOOD INSECURITY: WITHIN THE PAST 12 MONTHS, YOU WORRIED THAT YOUR FOOD WOULD RUN OUT BEFORE YOU GOT MONEY TO BUY MORE.: NEVER TRUE

## 2025-01-27 SDOH — ECONOMIC STABILITY: FOOD INSECURITY: WITHIN THE PAST 12 MONTHS, THE FOOD YOU BOUGHT JUST DIDN'T LAST AND YOU DIDN'T HAVE MONEY TO GET MORE.: NEVER TRUE

## 2025-01-27 SDOH — ECONOMIC STABILITY: INCOME INSECURITY: IN THE LAST 12 MONTHS, WAS THERE A TIME WHEN YOU WERE NOT ABLE TO PAY THE MORTGAGE OR RENT ON TIME?: NO

## 2025-01-27 ASSESSMENT — PATIENT HEALTH QUESTIONNAIRE - PHQ9
3. TROUBLE FALLING OR STAYING ASLEEP: SEVERAL DAYS
3. TROUBLE FALLING OR STAYING ASLEEP: SEVERAL DAYS
10. IF YOU CHECKED OFF ANY PROBLEMS, HOW DIFFICULT HAVE THESE PROBLEMS MADE IT FOR YOU TO DO YOUR WORK, TAKE CARE OF THINGS AT HOME, OR GET ALONG WITH OTHER PEOPLE: NOT DIFFICULT AT ALL
7. TROUBLE CONCENTRATING ON THINGS, SUCH AS READING THE NEWSPAPER OR WATCHING TELEVISION: NOT AT ALL
SUM OF ALL RESPONSES TO PHQ QUESTIONS 1-9: 8
5. POOR APPETITE OR OVEREATING: NOT AT ALL
10. IF YOU CHECKED OFF ANY PROBLEMS, HOW DIFFICULT HAVE THESE PROBLEMS MADE IT FOR YOU TO DO YOUR WORK, TAKE CARE OF THINGS AT HOME, OR GET ALONG WITH OTHER PEOPLE: NOT DIFFICULT AT ALL
8. MOVING OR SPEAKING SO SLOWLY THAT OTHER PEOPLE COULD HAVE NOTICED. OR THE OPPOSITE - BEING SO FIDGETY OR RESTLESS THAT YOU HAVE BEEN MOVING AROUND A LOT MORE THAN USUAL: MORE THAN HALF THE DAYS
SUM OF ALL RESPONSES TO PHQ QUESTIONS 1-9: 8
4. FEELING TIRED OR HAVING LITTLE ENERGY: SEVERAL DAYS
6. FEELING BAD ABOUT YOURSELF - OR THAT YOU ARE A FAILURE OR HAVE LET YOURSELF OR YOUR FAMILY DOWN: NOT AT ALL
SUM OF ALL RESPONSES TO PHQ QUESTIONS 1-9: 8
5. POOR APPETITE OR OVEREATING: NOT AT ALL
SUM OF ALL RESPONSES TO PHQ QUESTIONS 1-9: 8
7. TROUBLE CONCENTRATING ON THINGS, SUCH AS READING THE NEWSPAPER OR WATCHING TELEVISION: NOT AT ALL
SUM OF ALL RESPONSES TO PHQ QUESTIONS 1-9: 8
9. THOUGHTS THAT YOU WOULD BE BETTER OFF DEAD, OR OF HURTING YOURSELF: NOT AT ALL
SUM OF ALL RESPONSES TO PHQ9 QUESTIONS 1 & 2: 4
8. MOVING OR SPEAKING SO SLOWLY THAT OTHER PEOPLE COULD HAVE NOTICED. OR THE OPPOSITE, BEING SO FIGETY OR RESTLESS THAT YOU HAVE BEEN MOVING AROUND A LOT MORE THAN USUAL: MORE THAN HALF THE DAYS
9. THOUGHTS THAT YOU WOULD BE BETTER OFF DEAD, OR OF HURTING YOURSELF: NOT AT ALL
1. LITTLE INTEREST OR PLEASURE IN DOING THINGS: MORE THAN HALF THE DAYS
2. FEELING DOWN, DEPRESSED OR HOPELESS: MORE THAN HALF THE DAYS
4. FEELING TIRED OR HAVING LITTLE ENERGY: SEVERAL DAYS
1. LITTLE INTEREST OR PLEASURE IN DOING THINGS: MORE THAN HALF THE DAYS
2. FEELING DOWN, DEPRESSED OR HOPELESS: MORE THAN HALF THE DAYS
6. FEELING BAD ABOUT YOURSELF - OR THAT YOU ARE A FAILURE OR HAVE LET YOURSELF OR YOUR FAMILY DOWN: NOT AT ALL

## 2025-01-27 ASSESSMENT — VISUAL ACUITY: OU: 1

## 2025-01-27 NOTE — PROGRESS NOTES
Aneudy Shields (: 1955) is a 69 y.o. male, Established patient, here for evaluation of the following chief complaint(s):  Insect Bite (Patient states he was bitten by spiders in his bed multiple times on his upper arm, chest , neck and back.)      ASSESSMENT/PLAN:    ICD-10-CM    1. Herpes zoster without complication  B02.9           - Zoster Rash:  Given presence of small, vesicular rash with tightly packed, small, clear vesicles, with surrounding tenderness and itch, history of prodrome of burning/itching sensation, all occurring in a unilateral distribution within the same dermatome (T3), and with no history of skin injuries, traumas, or warm erythematous rash on exam, and pt noting past history of chicken pox as a child, concern for outbreak of herpes zoster.  Low concern for cellulitis, abscess, tinea, SJS, folliculitis, eczema, contact dermatitis, or sun burn.  Given presence of rash noted for over 1 week, with no visible vesicles remaining, there is low clinical indication for treatment with antivirals, such as Valtrex.  Discussed continuing with pain management regimen as used at home.  Discussed potential for exposure of others to the discharge from the vesicles as being contagious.   Monitor for signs of infection and return to clinic or f/u with PCP for concerns, or f/u with PCP for any persistent or worsening symptoms.  Recommended patient follow-up with his PCP appointment scheduled for tomorrow to discuss possible treatment with gabapentin, as well as future shingles vaccination.    Discussed PCP follow up for persisting or worsening symptoms, or to return to the clinic if unable to obtain PCP follow up for worsening symptoms.    The patient tolerated their visit well. The patient and/or the family were informed of the results of any tests, a time was given to answer questions, a plan was proposed and they agreed with plan. Reviewed AVS with treatment instructions and answered questions

## 2025-01-27 NOTE — PATIENT INSTRUCTIONS
As you have had the symptoms over the past week, with no new rashes in the last 5 days, it is too late to start the antiviral medications (Valtrex).  Discuss Gabapentin with your PCP appointment tomorrow.  Can also discuss getting a shingles vaccine to help prevent future outbreaks.    Continue using Tylenol, hot showers, and Biofreeze to help with the pain symptoms.    Continue to keep the rash clean and dry. Can cover with gauze, clothing, or a bandage if having any discharge -   Avoid allowing others to come into contact with the discharge as it can be contagious to those not vaccinated against Chicken Pox, have previously had Chicken Pox, or who are pregnant.    Watch for signs of infection (such as swelling, increased tenderness, discharge, purulence, spreading red rash, heat), and return to the clinic should any develop.

## 2025-01-28 ENCOUNTER — OFFICE VISIT (OUTPATIENT)
Dept: FAMILY MEDICINE CLINIC | Age: 70
End: 2025-01-28

## 2025-01-28 VITALS
TEMPERATURE: 98.6 F | OXYGEN SATURATION: 97 % | DIASTOLIC BLOOD PRESSURE: 70 MMHG | HEART RATE: 44 BPM | RESPIRATION RATE: 16 BRPM | WEIGHT: 201 LBS | SYSTOLIC BLOOD PRESSURE: 120 MMHG | BODY MASS INDEX: 35.61 KG/M2

## 2025-01-28 DIAGNOSIS — F31.60 BIPOLAR AFFECTIVE DISORDER, CURRENT EPISODE MIXED, CURRENT EPISODE SEVERITY UNSPECIFIED (HCC): ICD-10-CM

## 2025-01-28 DIAGNOSIS — N18.31 STAGE 3A CHRONIC KIDNEY DISEASE (HCC): ICD-10-CM

## 2025-01-28 DIAGNOSIS — B02.29 POSTHERPETIC NEURALGIA: Primary | ICD-10-CM

## 2025-01-28 DIAGNOSIS — G89.29 CHRONIC BILATERAL LOW BACK PAIN WITH SCIATICA, SCIATICA LATERALITY UNSPECIFIED: ICD-10-CM

## 2025-01-28 DIAGNOSIS — M54.40 CHRONIC BILATERAL LOW BACK PAIN WITH SCIATICA, SCIATICA LATERALITY UNSPECIFIED: ICD-10-CM

## 2025-01-28 DIAGNOSIS — E11.22 TYPE 2 DIABETES MELLITUS WITH CHRONIC KIDNEY DISEASE, WITHOUT LONG-TERM CURRENT USE OF INSULIN, UNSPECIFIED CKD STAGE (HCC): ICD-10-CM

## 2025-01-28 RX ORDER — GABAPENTIN 100 MG/1
100 CAPSULE ORAL 3 TIMES DAILY
Qty: 90 CAPSULE | Refills: 0 | Status: SHIPPED | OUTPATIENT
Start: 2025-01-28 | End: 2025-02-27

## 2025-01-28 ASSESSMENT — ENCOUNTER SYMPTOMS
DIARRHEA: 0
SHORTNESS OF BREATH: 0
COUGH: 0
ABDOMINAL PAIN: 0
NAUSEA: 0
VOMITING: 0

## 2025-01-28 NOTE — PATIENT INSTRUCTIONS
- Pharmacy for flu vaccine   - Shingles vaccine next visit     -Can alternate OTC Tylenol for the pain. Take with food.   *Can use Lidocaine patches. Leave on for 12 hrs and then have 12 hrs without the patch.   *Can use topical capsaicin or something similar at the other times.  GABAPENTIN 100 MG 1-3 PILLS AT A TIME , NIGHTLY   DO NOT TAKE WITH TIZANIDINE [AS NEEDED FOR THE BACK]  -The biggest concern is sedation, dizziness, peripheral edema. Do not drive or operate heavy machinery  Can take the gabapentin once daily for the first day (start taking at night to help with sleep) and increase daily up to 3 times a day as needed for the paiN     -Advised to return if symptoms persist or worsen or lesions do not heal or for any new or worsening symptoms.

## 2025-01-28 NOTE — PROGRESS NOTES
[facility]  [address]  [address]  [place], [place] [address]  Tel: [phone]      2025   SUBJECTIVE/OBJECTIVE  HPI    Aneudy Shields (:  1955) is a 69 y.o. male, here for evaluation of the following medical concerns:  Chief Complaint   Patient presents with    Herpes Zoster     Patient is a 69 y.o. male  has a past medical history of Allergic rhinitis, Anxiety, Arthritis, Bipolar Disorder, Chronic kidney disease, Depression, Diabetes mellitus (HCC), GERD (gastroesophageal reflux disease), Hearing loss, , Hypertension, Hypothyroid, Kidney disease,, OCD (obsessive compulsive disorder), Osteoarthritis, presents with painful rash     History of shingles  The patient has a 7 day history of a painful rash on the chest upper right.  Was seen in urgent care.  Has not had herpes zoster in the past. Healed, was not given any antiviral medication. Stabbing pain in the chest. Interested in pain medication. Has been using biofreeze. Not taking Tylenol or ibuprofen.   Reports he was prescribed doxycycline monohydrate    Back spams   Saw urgent care, prescribed tizanidine 4mg , medrol pack.       Type 2 diabetes  Diet controlled.  Most recent A1c 6.4 % (2024).    HPL : On simvastatin 40 mg      Hypothyroidism:  Takes levothyroxine 150 mcg daily.     Hypertension:Taking losartan 50 mg daily and amlodipine 5mg daily in the morning.  No recent lightheadedness, headaches, blurred vision, chest pain, orthopnea, palpitations, or peripheral edema.   History of sleep apnea not on CPAP.      Anxiety : Currently taking Buspar 10 in the morning and 2 at night and Pristiq 50 mg daily, fluvoxaMINE (LUVOX) 100 MG. Sees psychiatry.  GERD: Protonix 40mg daily        Review of Systems   Constitutional:  Negative for chills, diaphoresis, fatigue and fever.   Respiratory:  Negative for cough and shortness of breath.    Cardiovascular:  Negative for chest pain, palpitations and leg swelling.

## 2025-02-03 NOTE — PROGRESS NOTES
Called and let pt know that I have a couple weeks of samples up at the  for her. JS   Leon Soriano (:  1955) is a 79 y.o. male,Established patient, here for evaluation of the following chief complaint(s):  Otalgia      ASSESSMENT/PLAN:  1. Non-recurrent acute suppurative otitis media of both ears without spontaneous rupture of tympanic membranes  No improvement in ear infection  Stop Augmentin  Start Omnicef  Referral to PCP for further follow up. - [address] [place] [address]  - cefdinir (OMNICEF) 300 MG capsule; Take 1 capsule by mouth 2 times daily for 7 days  Dispense: 14 capsule; Refill: 0    2. Primary hypertension  Encouraged patient to monitor blood pressure and follow up with PCP. DASH Diet and Lifestyle changes discussed  Follow up with PCP - referral provided    - [address] [place] [address]     Return if symptoms worsen or fail to improve. SUBJECTIVE/OBJECTIVE:  Patient comes in today for ear check after dx of bilateral ear infection last week. States antibiotics has helped minimal to none. Is still having decreased hearing and fullness feeling of bilateral ears. History provided by:  Patient   used: No    Otalgia   Associated symptoms include hearing loss. Vitals:    02/10/23 1823   BP: (!) 153/69   Pulse: (!) 47   Temp: 98.6 °F (37 °C)   SpO2: 96%   Weight: 203 lb (92.1 kg)   Height: 5' 3\" (1.6 m)       Review of Systems   HENT:  Positive for ear pain and hearing loss. All other systems reviewed and are negative. Physical Exam  Vitals reviewed. Constitutional:       Appearance: Normal appearance. HENT:      Head: Normocephalic and atraumatic. Right Ear: A middle ear effusion is present. Tympanic membrane is erythematous. Left Ear: A middle ear effusion is present. Tympanic membrane is erythematous. Ears:      Comments: No change in exam from previous.       Nose: Nose normal.      Mouth/Throat:      Mouth: Mucous membranes are moist.      Pharynx: Oropharynx is clear. No oropharyngeal exudate or posterior oropharyngeal erythema. Eyes:      Pupils: Pupils are equal, round, and reactive to light. Cardiovascular:      Rate and Rhythm: Regular rhythm. Bradycardia present. Pulses: Normal pulses. Heart sounds: No murmur heard. No friction rub. No gallop. Pulmonary:      Effort: Pulmonary effort is normal.      Breath sounds: Normal breath sounds. Skin:     General: Skin is warm and dry. Capillary Refill: Capillary refill takes less than 2 seconds. Neurological:      Mental Status: He is alert and oriented to person, place, and time. An electronic signature was used to authenticate this note.     --Larisa Aguilar, TOBY - CNP

## 2025-02-21 ENCOUNTER — OFFICE VISIT (OUTPATIENT)
Dept: FAMILY MEDICINE CLINIC | Age: 70
End: 2025-02-21

## 2025-02-21 VITALS
OXYGEN SATURATION: 98 % | HEART RATE: 50 BPM | RESPIRATION RATE: 16 BRPM | WEIGHT: 200 LBS | TEMPERATURE: 97.8 F | DIASTOLIC BLOOD PRESSURE: 80 MMHG | SYSTOLIC BLOOD PRESSURE: 120 MMHG | BODY MASS INDEX: 35.43 KG/M2

## 2025-02-21 DIAGNOSIS — E78.2 MIXED HYPERLIPIDEMIA: ICD-10-CM

## 2025-02-21 DIAGNOSIS — Z23 NEED FOR SHINGLES VACCINE: ICD-10-CM

## 2025-02-21 DIAGNOSIS — I10 PRIMARY HYPERTENSION: ICD-10-CM

## 2025-02-21 DIAGNOSIS — K21.9 GASTROESOPHAGEAL REFLUX DISEASE WITHOUT ESOPHAGITIS: ICD-10-CM

## 2025-02-21 DIAGNOSIS — E03.9 ACQUIRED HYPOTHYROIDISM: ICD-10-CM

## 2025-02-21 DIAGNOSIS — E11.22 TYPE 2 DIABETES MELLITUS WITH CHRONIC KIDNEY DISEASE, WITHOUT LONG-TERM CURRENT USE OF INSULIN, UNSPECIFIED CKD STAGE (HCC): Primary | ICD-10-CM

## 2025-02-21 LAB — HBA1C MFR BLD: 6.4 %

## 2025-02-21 RX ORDER — PANTOPRAZOLE SODIUM 40 MG/1
40 TABLET, DELAYED RELEASE ORAL DAILY
Qty: 90 TABLET | Refills: 1 | Status: SHIPPED | OUTPATIENT
Start: 2025-02-21

## 2025-02-21 RX ORDER — LOSARTAN POTASSIUM 50 MG/1
50 TABLET ORAL DAILY
Qty: 90 TABLET | Refills: 3 | Status: SHIPPED | OUTPATIENT
Start: 2025-02-21

## 2025-02-21 RX ORDER — LEVOTHYROXINE SODIUM 150 UG/1
150 TABLET ORAL DAILY
Qty: 90 TABLET | Refills: 3 | Status: SHIPPED | OUTPATIENT
Start: 2025-02-21

## 2025-02-21 RX ORDER — GABAPENTIN 100 MG/1
100 CAPSULE ORAL 3 TIMES DAILY
Qty: 90 CAPSULE | Refills: 0 | Status: CANCELLED | OUTPATIENT
Start: 2025-02-21 | End: 2025-03-23

## 2025-02-21 RX ORDER — SIMVASTATIN 40 MG
40 TABLET ORAL NIGHTLY
Qty: 90 TABLET | Refills: 3 | Status: SHIPPED | OUTPATIENT
Start: 2025-02-21

## 2025-02-21 RX ORDER — AMLODIPINE BESYLATE 5 MG/1
5 TABLET ORAL DAILY
Qty: 90 TABLET | Refills: 3 | Status: SHIPPED | OUTPATIENT
Start: 2025-02-21

## 2025-02-21 SDOH — ECONOMIC STABILITY: FOOD INSECURITY: WITHIN THE PAST 12 MONTHS, THE FOOD YOU BOUGHT JUST DIDN'T LAST AND YOU DIDN'T HAVE MONEY TO GET MORE.: NEVER TRUE

## 2025-02-21 SDOH — ECONOMIC STABILITY: FOOD INSECURITY: WITHIN THE PAST 12 MONTHS, YOU WORRIED THAT YOUR FOOD WOULD RUN OUT BEFORE YOU GOT MONEY TO BUY MORE.: NEVER TRUE

## 2025-02-21 ASSESSMENT — ENCOUNTER SYMPTOMS
DIARRHEA: 0
SHORTNESS OF BREATH: 0
VOMITING: 0
ABDOMINAL PAIN: 0
NAUSEA: 0
COUGH: 0

## 2025-02-21 ASSESSMENT — PATIENT HEALTH QUESTIONNAIRE - PHQ9
SUM OF ALL RESPONSES TO PHQ QUESTIONS 1-9: 0
5. POOR APPETITE OR OVEREATING: NOT AT ALL
6. FEELING BAD ABOUT YOURSELF - OR THAT YOU ARE A FAILURE OR HAVE LET YOURSELF OR YOUR FAMILY DOWN: NOT AT ALL
1. LITTLE INTEREST OR PLEASURE IN DOING THINGS: NOT AT ALL
SUM OF ALL RESPONSES TO PHQ QUESTIONS 1-9: 0
8. MOVING OR SPEAKING SO SLOWLY THAT OTHER PEOPLE COULD HAVE NOTICED. OR THE OPPOSITE, BEING SO FIGETY OR RESTLESS THAT YOU HAVE BEEN MOVING AROUND A LOT MORE THAN USUAL: NOT AT ALL
10. IF YOU CHECKED OFF ANY PROBLEMS, HOW DIFFICULT HAVE THESE PROBLEMS MADE IT FOR YOU TO DO YOUR WORK, TAKE CARE OF THINGS AT HOME, OR GET ALONG WITH OTHER PEOPLE: NOT DIFFICULT AT ALL
9. THOUGHTS THAT YOU WOULD BE BETTER OFF DEAD, OR OF HURTING YOURSELF: NOT AT ALL
2. FEELING DOWN, DEPRESSED OR HOPELESS: NOT AT ALL
SUM OF ALL RESPONSES TO PHQ QUESTIONS 1-9: 0
SUM OF ALL RESPONSES TO PHQ9 QUESTIONS 1 & 2: 0
7. TROUBLE CONCENTRATING ON THINGS, SUCH AS READING THE NEWSPAPER OR WATCHING TELEVISION: NOT AT ALL
3. TROUBLE FALLING OR STAYING ASLEEP: NOT AT ALL
SUM OF ALL RESPONSES TO PHQ QUESTIONS 1-9: 0
4. FEELING TIRED OR HAVING LITTLE ENERGY: NOT AT ALL

## 2025-02-21 NOTE — PROGRESS NOTES
[facility]  [address]  [address]  [place], [place] [address]  Tel: [phone]      2025   SUBJECTIVE/OBJECTIVE  HPI    Aneudy Shields (:  1955) is a 69 y.o. male, here for evaluation of the following medical concerns:  Chief Complaint   Patient presents with    Diabetes     Patient is a 69 y.o. male  has a past medical history of Allergic rhinitis, Anxiety, Arthritis, Bipolar Disorder, Chronic kidney disease, Depression, Diabetes mellitus (HCC), GERD (gastroesophageal reflux disease), Hearing loss, HYPERCHOLESTERAEMIA, Hypertension, Hypothyroid, Kidney disease, Nephritis, OCD (obsessive compulsive disorder), Osteoarthritis, PTSD, Screening PSA (prostate specific antigen), and Sleep apnea. who presents for diabetes follow up      Type 2 diabetes  Diet controlled.  Most recent A1c 6.4 % (2025, 2024), 6.1 % (2024 - home reading) 6.0% (2/15/2024)6.7% 23.Checks blood sugars at home  fasting. Not taking any diabetes medications.  No numbnes or tingling in the feet. No polyphagia or polydipsia. Endorses nocturia.  Diet: Salads, apples , strawberries, baked beans, gaytan no nitrates , windmill cookie . Limits sweets - muffins ,  Exercise: Walking his dog , 3-4 day   HPL : On simvastatin 40 mg         Hypothyroidism:  Takes levothyroxine 150 mcg daily.  Denies weight changes, change in bowel pattern, heat/ cold intolerance, or dyspnea on exertion.      Hypertension:Taking losartan 50 mg daily and amlodipine 5mg daily in the morning.   No recent lightheadedness, headaches, blurred vision, chest pain, orthopnea, palpitations, or peripheral edema.   History of sleep apnea not on CPAP.      Anxiety : Endorses some anxiety, patient discontinued fluvoxamine due to concerns regarding blood sugars.  Discussed with patient that this medication does not affect blood sugars.  Currently taking Buspar 10 in the morning and 2 at night and Pristiq 50 mg daily, managed

## 2025-02-22 LAB
CHOLEST SERPL-MCNC: 176 MG/DL (ref 0–199)
HDLC SERPL-MCNC: 57 MG/DL (ref 40–60)
LDLC SERPL CALC-MCNC: 104 MG/DL
TRIGL SERPL-MCNC: 75 MG/DL (ref 0–150)
VLDLC SERPL CALC-MCNC: 15 MG/DL

## 2025-02-23 ENCOUNTER — OFFICE VISIT (OUTPATIENT)
Dept: URGENT CARE | Age: 70
End: 2025-02-23

## 2025-02-23 VITALS
HEART RATE: 66 BPM | TEMPERATURE: 99 F | SYSTOLIC BLOOD PRESSURE: 110 MMHG | BODY MASS INDEX: 34.31 KG/M2 | WEIGHT: 201 LBS | OXYGEN SATURATION: 94 % | HEIGHT: 64 IN | DIASTOLIC BLOOD PRESSURE: 66 MMHG

## 2025-02-23 DIAGNOSIS — U07.1 COVID: ICD-10-CM

## 2025-02-23 DIAGNOSIS — R05.1 ACUTE COUGH: Primary | ICD-10-CM

## 2025-02-23 LAB
INFLUENZA A ANTIGEN, POC: NEGATIVE
INFLUENZA B ANTIGEN, POC: NEGATIVE
Lab: ABNORMAL
PERFORMING INSTRUMENT: ABNORMAL
QC PASS/FAIL: ABNORMAL
SARS-COV-2, POC: DETECTED

## 2025-02-23 RX ORDER — DEXTROMETHORPHAN HYDROBROMIDE AND PROMETHAZINE HYDROCHLORIDE 15; 6.25 MG/5ML; MG/5ML
5 SYRUP ORAL 4 TIMES DAILY PRN
Qty: 120 ML | Refills: 0 | Status: SHIPPED | OUTPATIENT
Start: 2025-02-23 | End: 2025-03-02

## 2025-02-24 DIAGNOSIS — E78.2 MIXED HYPERLIPIDEMIA: ICD-10-CM

## 2025-02-24 RX ORDER — SIMVASTATIN 80 MG
80 TABLET ORAL NIGHTLY
Qty: 90 TABLET | Refills: 0 | Status: SHIPPED | OUTPATIENT
Start: 2025-02-24 | End: 2025-02-24 | Stop reason: SDUPTHER

## 2025-02-25 RX ORDER — SIMVASTATIN 80 MG
80 TABLET ORAL NIGHTLY
Qty: 90 TABLET | Refills: 0 | Status: SHIPPED | OUTPATIENT
Start: 2025-02-25

## 2025-03-04 ENCOUNTER — TELEPHONE (OUTPATIENT)
Dept: FAMILY MEDICINE CLINIC | Age: 70
End: 2025-03-04

## 2025-03-04 DIAGNOSIS — E78.2 MIXED HYPERLIPIDEMIA: Primary | ICD-10-CM

## 2025-03-04 NOTE — TELEPHONE ENCOUNTER
[facility] Calling in about this medication and the dosage asking if this the correct dose due to the dose being higher than the recommended start dosage.    Medication:   simvastatin (ZOCOR) 80 MG tablet

## 2025-03-05 ENCOUNTER — OFFICE VISIT (OUTPATIENT)
Dept: FAMILY MEDICINE CLINIC | Age: 70
End: 2025-03-05
Payer: MEDICARE

## 2025-03-05 VITALS
BODY MASS INDEX: 33.64 KG/M2 | OXYGEN SATURATION: 97 % | TEMPERATURE: 97.6 F | WEIGHT: 196 LBS | HEART RATE: 47 BPM | SYSTOLIC BLOOD PRESSURE: 120 MMHG | RESPIRATION RATE: 16 BRPM | DIASTOLIC BLOOD PRESSURE: 70 MMHG

## 2025-03-05 DIAGNOSIS — R25.1 TREMOR: ICD-10-CM

## 2025-03-05 DIAGNOSIS — Z82.0 FAMILY HISTORY OF PARKINSON DISEASE: ICD-10-CM

## 2025-03-05 DIAGNOSIS — E78.5 HYPERLIPIDEMIA, UNSPECIFIED HYPERLIPIDEMIA TYPE: ICD-10-CM

## 2025-03-05 DIAGNOSIS — E78.2 MIXED HYPERLIPIDEMIA: ICD-10-CM

## 2025-03-05 DIAGNOSIS — M79.10 MYALGIA: ICD-10-CM

## 2025-03-05 DIAGNOSIS — Z86.16 HISTORY OF COVID-19: Primary | ICD-10-CM

## 2025-03-05 PROCEDURE — 3078F DIAST BP <80 MM HG: CPT | Performed by: STUDENT IN AN ORGANIZED HEALTH CARE EDUCATION/TRAINING PROGRAM

## 2025-03-05 PROCEDURE — G8417 CALC BMI ABV UP PARAM F/U: HCPCS | Performed by: STUDENT IN AN ORGANIZED HEALTH CARE EDUCATION/TRAINING PROGRAM

## 2025-03-05 PROCEDURE — 3017F COLORECTAL CA SCREEN DOC REV: CPT | Performed by: STUDENT IN AN ORGANIZED HEALTH CARE EDUCATION/TRAINING PROGRAM

## 2025-03-05 PROCEDURE — G8427 DOCREV CUR MEDS BY ELIG CLIN: HCPCS | Performed by: STUDENT IN AN ORGANIZED HEALTH CARE EDUCATION/TRAINING PROGRAM

## 2025-03-05 PROCEDURE — 99214 OFFICE O/P EST MOD 30 MIN: CPT | Performed by: STUDENT IN AN ORGANIZED HEALTH CARE EDUCATION/TRAINING PROGRAM

## 2025-03-05 PROCEDURE — G2211 COMPLEX E/M VISIT ADD ON: HCPCS | Performed by: STUDENT IN AN ORGANIZED HEALTH CARE EDUCATION/TRAINING PROGRAM

## 2025-03-05 PROCEDURE — 3074F SYST BP LT 130 MM HG: CPT | Performed by: STUDENT IN AN ORGANIZED HEALTH CARE EDUCATION/TRAINING PROGRAM

## 2025-03-05 PROCEDURE — 1123F ACP DISCUSS/DSCN MKR DOCD: CPT | Performed by: STUDENT IN AN ORGANIZED HEALTH CARE EDUCATION/TRAINING PROGRAM

## 2025-03-05 PROCEDURE — 1036F TOBACCO NON-USER: CPT | Performed by: STUDENT IN AN ORGANIZED HEALTH CARE EDUCATION/TRAINING PROGRAM

## 2025-03-05 PROCEDURE — 1159F MED LIST DOCD IN RCRD: CPT | Performed by: STUDENT IN AN ORGANIZED HEALTH CARE EDUCATION/TRAINING PROGRAM

## 2025-03-05 RX ORDER — ROSUVASTATIN CALCIUM 10 MG/1
10 TABLET, COATED ORAL DAILY
Qty: 90 TABLET | Refills: 0 | Status: SHIPPED | OUTPATIENT
Start: 2025-03-05 | End: 2025-03-07 | Stop reason: SDUPTHER

## 2025-03-05 RX ORDER — ROSUVASTATIN CALCIUM 10 MG/1
10 TABLET, COATED ORAL DAILY
Qty: 90 TABLET | Refills: 1 | Status: SHIPPED | OUTPATIENT
Start: 2025-03-05 | End: 2025-03-05 | Stop reason: SDUPTHER

## 2025-03-05 SDOH — ECONOMIC STABILITY: FOOD INSECURITY: WITHIN THE PAST 12 MONTHS, YOU WORRIED THAT YOUR FOOD WOULD RUN OUT BEFORE YOU GOT MONEY TO BUY MORE.: NEVER TRUE

## 2025-03-05 SDOH — ECONOMIC STABILITY: FOOD INSECURITY: WITHIN THE PAST 12 MONTHS, THE FOOD YOU BOUGHT JUST DIDN'T LAST AND YOU DIDN'T HAVE MONEY TO GET MORE.: NEVER TRUE

## 2025-03-05 ASSESSMENT — PATIENT HEALTH QUESTIONNAIRE - PHQ9
7. TROUBLE CONCENTRATING ON THINGS, SUCH AS READING THE NEWSPAPER OR WATCHING TELEVISION: NOT AT ALL
SUM OF ALL RESPONSES TO PHQ QUESTIONS 1-9: 0
6. FEELING BAD ABOUT YOURSELF - OR THAT YOU ARE A FAILURE OR HAVE LET YOURSELF OR YOUR FAMILY DOWN: NOT AT ALL
1. LITTLE INTEREST OR PLEASURE IN DOING THINGS: NOT AT ALL
3. TROUBLE FALLING OR STAYING ASLEEP: NOT AT ALL
8. MOVING OR SPEAKING SO SLOWLY THAT OTHER PEOPLE COULD HAVE NOTICED. OR THE OPPOSITE, BEING SO FIGETY OR RESTLESS THAT YOU HAVE BEEN MOVING AROUND A LOT MORE THAN USUAL: NOT AT ALL
SUM OF ALL RESPONSES TO PHQ QUESTIONS 1-9: 0
SUM OF ALL RESPONSES TO PHQ QUESTIONS 1-9: 0
10. IF YOU CHECKED OFF ANY PROBLEMS, HOW DIFFICULT HAVE THESE PROBLEMS MADE IT FOR YOU TO DO YOUR WORK, TAKE CARE OF THINGS AT HOME, OR GET ALONG WITH OTHER PEOPLE: NOT DIFFICULT AT ALL
2. FEELING DOWN, DEPRESSED OR HOPELESS: NOT AT ALL
5. POOR APPETITE OR OVEREATING: NOT AT ALL
4. FEELING TIRED OR HAVING LITTLE ENERGY: NOT AT ALL
SUM OF ALL RESPONSES TO PHQ QUESTIONS 1-9: 0
9. THOUGHTS THAT YOU WOULD BE BETTER OFF DEAD, OR OF HURTING YOURSELF: NOT AT ALL

## 2025-03-05 NOTE — PROGRESS NOTES
[facility]  [address]  [address]  [place], [place] [address]  Tel: [phone]      3/5/2025   SUBJECTIVE/OBJECTIVE  HPI    Aneudy Shields (:  1955) is a 69 y.o. male, here for evaluation of the following medical concerns:  Chief Complaint   Patient presents with    Cough     Patient is a 69 y.o. male  has a past medical history of Allergic rhinitis, Anxiety, Arthritis, Bipolar Disorder, Chronic kidney disease, Depression, Diabetes mellitus (HCC), GERD (gastroesophageal reflux disease), Hearing loss, HYPERCHOLESTERAEMIA, Hypertension, Hypothyroid, Kidney disease, Nephritis, OCD (obsessive compulsive disorder), Osteoarthritis, PTSD, Screening PSA (prostate specific antigen), and Sleep apnea. who presents for cough and muscle aches.  Patient tested positive for COVID-19 on 2025.  Patient was treated with Paxlovid.      Cough  The problem has been gradually improving. Pertinent negatives include no chest pain, chills, ear congestion, ear pain, fever, nasal congestion, postnasal drip, rash, rhinorrhea, sore throat, shortness of breath or wheezing. He has tried prescription cough suppressant for the symptoms.   Using cough syrup.   Pain in elbow and chest. Declined intervention         Tremors   Pt c/o oral and hand tremors. Also at rest, some days are better than others.   Cogwheel rigidity: no . No slow movement. No shuffling gait.   Affecting daily activities like  handling eating utensils.  Endorses head  involvement. No alcohol involvement   Worsens with anxiety. Reports issues with wife.   Mother with Parkinson like tremor   No loss   Smell loss  Speech problems  Swallowing problems  Vision changes        HPL : On simvastatin 40 mg . Discussed switching to Crestor.        Type 2 diabetes  Diet controlled.  Most recent A1c 6.4 % (3/5/2025, 2024), 6.1 % (2024 - home reading) 6.0% (2/15/2024)6.7% 23.Checks blood sugars at home  fasting. Not taking any

## 2025-03-05 NOTE — TELEPHONE ENCOUNTER
Switch prescription to rosuvastatin/Crestor 10 mg daily.  Prescription sent.  Please call patient and inform that insurance not covering simvastatin 80 mg daily.  Will switch statin medication from simvastatin to rosuvastatin/Crestor 10 mg daily.

## 2025-03-05 NOTE — PATIENT INSTRUCTIONS
STOP simvastatin - start  rosuvastatin (CRESTOR) 10 MG tablet daily start end of week Friday   Follow up next visit   Elbow   -an elastic bandage,   Make sure that you gently bend and straighten your elbow each day if you are using a sling, bandage, or splint. This will help prevent elbow stiffness.  Put ice or a cold pack on your elbow for 10 to 20 minutes at a time. Try to do this every 1 to 2 hours for the next 3 days (when you are awake) or until the swelling goes down. Put a thin cloth between the ice and your skin.  After 2 or 3 days, you can try applying heat to your elbow. Apply heat for 10 to 20 minutes at a time, several times a day. You might also try switching between ice and heat.  Rest your elbow. Try to stop or reduce any activity that causes pain.

## 2025-03-05 NOTE — TELEPHONE ENCOUNTER
Called and spoke with patient. Patient stated that he thinks they will cover the 40 mg can he just double up on it.

## 2025-03-07 DIAGNOSIS — E78.2 MIXED HYPERLIPIDEMIA: ICD-10-CM

## 2025-03-10 ENCOUNTER — TELEPHONE (OUTPATIENT)
Dept: FAMILY MEDICINE CLINIC | Age: 70
End: 2025-03-10

## 2025-03-10 DIAGNOSIS — E78.2 MIXED HYPERLIPIDEMIA: ICD-10-CM

## 2025-03-10 RX ORDER — ROSUVASTATIN CALCIUM 10 MG/1
10 TABLET, COATED ORAL DAILY
Qty: 14 TABLET | Refills: 0 | Status: SHIPPED | OUTPATIENT
Start: 2025-03-10 | End: 2025-03-10

## 2025-03-10 RX ORDER — ROSUVASTATIN CALCIUM 10 MG/1
10 TABLET, COATED ORAL DAILY
Qty: 90 TABLET | Refills: 0 | Status: SHIPPED | OUTPATIENT
Start: 2025-03-10

## 2025-03-10 NOTE — TELEPHONE ENCOUNTER
Patient is asking for a 30 day supply for new medication.         Future Appointments   Date Time Provider Department Center   3/18/2025  2:00 PM Tessa Ramirez DO [facility]   5/28/2025  2:00 PM Aicha Bolton MD [facility]   6/16/2025 10:15 AM Aneudy Lockett MD [facility] -           [place] 3/5/2025

## 2025-03-10 NOTE — TELEPHONE ENCOUNTER
Please call pharmacy and check if they have received this prescription, and follow-up to see when they will send it

## 2025-03-10 NOTE — TELEPHONE ENCOUNTER
Patient calling in stating he has not received his crestor from [facility] and has been out of cholesterol medication for 2 weeks.   Patient going to call [facility] to find out when he should be receiving it.     3/5/2025

## 2025-03-11 NOTE — TELEPHONE ENCOUNTER
Called [place] they said the script that was sent in shows as cancelled.    Verbal given as written so they could get the medication ready to mail out. The other script was sent to [facility].    Take 1 tablet by mouth daily Dispense: 90 tablet, Refills: 0 ordered  03/05/2025 03/07/2025 REORDER Aicha Bolton MD               Summary: Take 1 tablet by mouth daily, Disp-90 tablet, R-0 Normal  Guidelines: Dose, Route, Frequency: 10 mg, Oral, DAILYDx Associated: Taking: End Date Warning: Different Encounter: Start Date & Time: 03/05/2025End w/ Doses: 3/7/2025Discontinued On: 03/10/2025Ord/Sold: 03/05/2025 (O)Ordered On: 03/05/2025Pharmacy: [facility] - [facility] [address] - P [phone] - F [phone]           Ordering Department: [facility] STEPHANIE

## 2025-03-12 NOTE — TELEPHONE ENCOUNTER
Pt called because he has been out of cholesterol med for almost 3 wks. He would like a 2 wk supply sent to his local [facility] until he gets his mail order supply.     Crestor #[phone] to [email or web address]

## 2025-05-13 ENCOUNTER — TELEPHONE (OUTPATIENT)
Dept: FAMILY MEDICINE CLINIC | Age: 70
End: 2025-05-13

## 2025-05-13 NOTE — TELEPHONE ENCOUNTER
dentist ciro darby, # [phone]. needing a call from . Patient will need a muscle relaxer to perform dental procedure.   Please call dr ciro darby , she would like to discuss patients dental issues.  Dentist is unable to get dental instruments in his mouth.

## 2025-05-14 NOTE — TELEPHONE ENCOUNTER
Called and spoke with dentis office they stated that patient need to have a seadation medication for patient.      After talking to patient he scheduled an apponitment for this week.

## 2025-05-16 ENCOUNTER — OFFICE VISIT (OUTPATIENT)
Dept: FAMILY MEDICINE CLINIC | Age: 70
End: 2025-05-16

## 2025-05-16 VITALS
TEMPERATURE: 97.6 F | OXYGEN SATURATION: 94 % | BODY MASS INDEX: 32.96 KG/M2 | WEIGHT: 192 LBS | DIASTOLIC BLOOD PRESSURE: 80 MMHG | HEART RATE: 49 BPM | SYSTOLIC BLOOD PRESSURE: 130 MMHG | RESPIRATION RATE: 16 BRPM

## 2025-05-16 DIAGNOSIS — E78.2 MIXED HYPERLIPIDEMIA: ICD-10-CM

## 2025-05-16 DIAGNOSIS — Z79.2 NEED FOR ANTIBIOTIC PROPHYLAXIS FOR DENTAL PROCEDURE: Primary | ICD-10-CM

## 2025-05-16 DIAGNOSIS — E11.22 TYPE 2 DIABETES MELLITUS WITH CHRONIC KIDNEY DISEASE, WITHOUT LONG-TERM CURRENT USE OF INSULIN, UNSPECIFIED CKD STAGE (HCC): ICD-10-CM

## 2025-05-16 DIAGNOSIS — F41.9 ANXIETY DUE TO INVASIVE PROCEDURE: ICD-10-CM

## 2025-05-16 DIAGNOSIS — R25.1 TREMOR: ICD-10-CM

## 2025-05-16 RX ORDER — ALPRAZOLAM 0.5 MG
0.5 TABLET ORAL 3 TIMES DAILY PRN
Qty: 3 TABLET | Refills: 0 | Status: SHIPPED | OUTPATIENT
Start: 2025-05-16 | End: 2025-05-30

## 2025-05-16 RX ORDER — AMOXICILLIN 500 MG/1
CAPSULE ORAL
Qty: 4 CAPSULE | Refills: 0 | Status: SHIPPED | OUTPATIENT
Start: 2025-05-16

## 2025-05-16 ASSESSMENT — ENCOUNTER SYMPTOMS
COUGH: 0
VOMITING: 0
SHORTNESS OF BREATH: 0
ABDOMINAL PAIN: 0
DIARRHEA: 0
NAUSEA: 0

## 2025-05-16 NOTE — PROGRESS NOTES
intolerance, or dyspnea on exertion.      Lab Results   Component Value Date    TSH 2.41 05/21/2024       Hypertension:Taking losartan 50 mg daily and amlodipine 5mg daily in the morning.   No recent lightheadedness, headaches, blurred vision, chest pain, orthopnea, palpitations, or peripheral edema.   History of sleep apnea not on CPAP.           Review of Systems   Constitutional:  Negative for chills, diaphoresis, fatigue and fever.   Respiratory:  Negative for cough and shortness of breath.    Cardiovascular:  Negative for chest pain, palpitations and leg swelling.   Gastrointestinal:  Negative for abdominal pain, diarrhea, nausea and vomiting.   Skin:  Negative for rash.   Neurological:  Negative for dizziness and light-headedness.   Psychiatric/Behavioral:  Negative for dysphoric mood and sleep disturbance.               Physical exam  /80 (BP Site: Left Upper Arm, Patient Position: Sitting)   Pulse (!) 49   Temp 97.6 °F (36.4 °C) (Temporal)   Resp 16   Wt 87.1 kg (192 lb)   SpO2 94%   BMI 32.96 kg/m²   Physical Exam  Constitutional:       General: He is not in acute distress.     Appearance: He is not toxic-appearing.   HENT:      Head: Normocephalic.      Right Ear: External ear normal.      Left Ear: External ear normal.      Nose: Nose normal.      Mouth/Throat:      Mouth: Mucous membranes are moist.      Dentition: Abnormal dentition. Dental caries present. No gingival swelling or dental abscesses.      Pharynx: Oropharynx is clear.   Eyes:      Extraocular Movements: Extraocular movements intact.      Conjunctiva/sclera: Conjunctivae normal.      Pupils: Pupils are equal, round, and reactive to light.   Cardiovascular:      Rate and Rhythm: Normal rate and regular rhythm.      Heart sounds: Normal heart sounds.   Pulmonary:      Effort: Pulmonary effort is normal. No respiratory distress.      Breath sounds: Normal breath sounds.   Abdominal:      General: Abdomen is flat. Bowel sounds are

## 2025-05-16 NOTE — PATIENT INSTRUCTIONS
Alprazolam  Immediate release: Oral, : 0.5 mg 30 to 90 minutes before procedure; if needed due to incomplete response and/or duration of procedure, may repeat the dose (usually at 50% of the initial dose) after 30 to 60 minutes     Side effects of benzodiazepines include sedation, impaired psychomotor performance, amnesia, and abuse, as well as dependence and withdrawal symptoms after long-term treatment     Discussed with patient regarding taking a test dose to gauge the duration and degree to which the medication may affect performance  and to avoid alcohol and driving during the hours after taking the medication.

## 2025-05-17 LAB
ALBUMIN SERPL-MCNC: 4.1 G/DL (ref 3.4–5)
ALP SERPL-CCNC: 83 U/L (ref 40–129)
ALT SERPL-CCNC: 24 U/L (ref 10–40)
AST SERPL-CCNC: 18 U/L (ref 15–37)
BILIRUB DIRECT SERPL-MCNC: 0.2 MG/DL (ref 0–0.3)
BILIRUB INDIRECT SERPL-MCNC: 0.2 MG/DL (ref 0–1)
BILIRUB SERPL-MCNC: 0.4 MG/DL (ref 0–1)
CHOLEST SERPL-MCNC: 154 MG/DL (ref 0–199)
HDLC SERPL-MCNC: 60 MG/DL (ref 40–60)
LDLC SERPL CALC-MCNC: 77 MG/DL
PROT SERPL-MCNC: 6.3 G/DL (ref 6.4–8.2)
TRIGL SERPL-MCNC: 85 MG/DL (ref 0–150)
VLDLC SERPL CALC-MCNC: 17 MG/DL

## 2025-05-19 ENCOUNTER — RESULTS FOLLOW-UP (OUTPATIENT)
Dept: FAMILY MEDICINE CLINIC | Age: 70
End: 2025-05-19

## 2025-05-28 ENCOUNTER — OFFICE VISIT (OUTPATIENT)
Dept: FAMILY MEDICINE CLINIC | Age: 70
End: 2025-05-28
Payer: MEDICARE

## 2025-05-28 VITALS
TEMPERATURE: 97.1 F | BODY MASS INDEX: 33.12 KG/M2 | SYSTOLIC BLOOD PRESSURE: 110 MMHG | HEIGHT: 64 IN | WEIGHT: 194 LBS | DIASTOLIC BLOOD PRESSURE: 60 MMHG | OXYGEN SATURATION: 97 % | HEART RATE: 41 BPM | RESPIRATION RATE: 16 BRPM

## 2025-05-28 DIAGNOSIS — F31.60 BIPOLAR AFFECTIVE DISORDER, CURRENT EPISODE MIXED, CURRENT EPISODE SEVERITY UNSPECIFIED (HCC): ICD-10-CM

## 2025-05-28 DIAGNOSIS — R35.1 NOCTURIA MORE THAN TWICE PER NIGHT: ICD-10-CM

## 2025-05-28 DIAGNOSIS — E78.2 MIXED HYPERLIPIDEMIA: ICD-10-CM

## 2025-05-28 DIAGNOSIS — E03.9 ACQUIRED HYPOTHYROIDISM: ICD-10-CM

## 2025-05-28 DIAGNOSIS — I10 PRIMARY HYPERTENSION: ICD-10-CM

## 2025-05-28 DIAGNOSIS — Z00.00 MEDICARE ANNUAL WELLNESS VISIT, SUBSEQUENT: Primary | ICD-10-CM

## 2025-05-28 DIAGNOSIS — R25.1 TREMOR: ICD-10-CM

## 2025-05-28 DIAGNOSIS — Z12.5 SCREENING PSA (PROSTATE SPECIFIC ANTIGEN): ICD-10-CM

## 2025-05-28 DIAGNOSIS — E11.22 TYPE 2 DIABETES MELLITUS WITH CHRONIC KIDNEY DISEASE, WITHOUT LONG-TERM CURRENT USE OF INSULIN, UNSPECIFIED CKD STAGE (HCC): ICD-10-CM

## 2025-05-28 DIAGNOSIS — K21.9 GASTROESOPHAGEAL REFLUX DISEASE WITHOUT ESOPHAGITIS: ICD-10-CM

## 2025-05-28 PROCEDURE — 3078F DIAST BP <80 MM HG: CPT | Performed by: STUDENT IN AN ORGANIZED HEALTH CARE EDUCATION/TRAINING PROGRAM

## 2025-05-28 PROCEDURE — 1159F MED LIST DOCD IN RCRD: CPT | Performed by: STUDENT IN AN ORGANIZED HEALTH CARE EDUCATION/TRAINING PROGRAM

## 2025-05-28 PROCEDURE — 3017F COLORECTAL CA SCREEN DOC REV: CPT | Performed by: STUDENT IN AN ORGANIZED HEALTH CARE EDUCATION/TRAINING PROGRAM

## 2025-05-28 PROCEDURE — 3074F SYST BP LT 130 MM HG: CPT | Performed by: STUDENT IN AN ORGANIZED HEALTH CARE EDUCATION/TRAINING PROGRAM

## 2025-05-28 PROCEDURE — G0439 PPPS, SUBSEQ VISIT: HCPCS | Performed by: STUDENT IN AN ORGANIZED HEALTH CARE EDUCATION/TRAINING PROGRAM

## 2025-05-28 PROCEDURE — 3044F HG A1C LEVEL LT 7.0%: CPT | Performed by: STUDENT IN AN ORGANIZED HEALTH CARE EDUCATION/TRAINING PROGRAM

## 2025-05-28 PROCEDURE — 1123F ACP DISCUSS/DSCN MKR DOCD: CPT | Performed by: STUDENT IN AN ORGANIZED HEALTH CARE EDUCATION/TRAINING PROGRAM

## 2025-05-28 RX ORDER — FLUVOXAMINE MALEATE 100 MG
100 TABLET ORAL NIGHTLY
COMMUNITY

## 2025-05-28 ASSESSMENT — PATIENT HEALTH QUESTIONNAIRE - PHQ9
3. TROUBLE FALLING OR STAYING ASLEEP: NOT AT ALL
8. MOVING OR SPEAKING SO SLOWLY THAT OTHER PEOPLE COULD HAVE NOTICED. OR THE OPPOSITE, BEING SO FIGETY OR RESTLESS THAT YOU HAVE BEEN MOVING AROUND A LOT MORE THAN USUAL: NOT AT ALL
SUM OF ALL RESPONSES TO PHQ QUESTIONS 1-9: 0
1. LITTLE INTEREST OR PLEASURE IN DOING THINGS: NOT AT ALL
SUM OF ALL RESPONSES TO PHQ QUESTIONS 1-9: 0
5. POOR APPETITE OR OVEREATING: NOT AT ALL
2. FEELING DOWN, DEPRESSED OR HOPELESS: NOT AT ALL
4. FEELING TIRED OR HAVING LITTLE ENERGY: NOT AT ALL
7. TROUBLE CONCENTRATING ON THINGS, SUCH AS READING THE NEWSPAPER OR WATCHING TELEVISION: NOT AT ALL
10. IF YOU CHECKED OFF ANY PROBLEMS, HOW DIFFICULT HAVE THESE PROBLEMS MADE IT FOR YOU TO DO YOUR WORK, TAKE CARE OF THINGS AT HOME, OR GET ALONG WITH OTHER PEOPLE: NOT DIFFICULT AT ALL
9. THOUGHTS THAT YOU WOULD BE BETTER OFF DEAD, OR OF HURTING YOURSELF: NOT AT ALL
6. FEELING BAD ABOUT YOURSELF - OR THAT YOU ARE A FAILURE OR HAVE LET YOURSELF OR YOUR FAMILY DOWN: NOT AT ALL
SUM OF ALL RESPONSES TO PHQ QUESTIONS 1-9: 0
SUM OF ALL RESPONSES TO PHQ QUESTIONS 1-9: 0

## 2025-05-28 NOTE — PATIENT INSTRUCTIONS
Labs today   A Healthy Heart: Care Instructions  Overview     Coronary artery disease, also called heart disease, occurs when a substance called plaque builds up in the vessels that supply oxygen-rich blood to your heart muscle. This can narrow the blood vessels and reduce blood flow. A heart attack happens when blood flow is completely blocked. A high-fat diet, smoking, and other factors increase the risk of heart disease.  Your doctor has found that you have a chance of having heart disease. A heart-healthy lifestyle can help keep your heart healthy and prevent heart disease. This lifestyle includes eating healthy, being active, staying at a weight that's healthy for you, and not smoking or using tobacco. It also includes taking medicines as directed, managing other health conditions, and trying to get a healthy amount of sleep.  Follow-up care is a key part of your treatment and safety. Be sure to make and go to all appointments, and call your doctor if you are having problems. It's also a good idea to know your test results and keep a list of the medicines you take.  How can you care for yourself at home?  Diet    Use less salt when you cook and eat. This helps lower your blood pressure. Taste food before salting. Add only a little salt when you think you need it. With time, your taste buds will adjust to less salt.     Eat fewer snack items, fast foods, canned soups, and other high-salt, high-fat, processed foods.     Read food labels and try to avoid saturated and trans fats. They increase your risk of heart disease by raising cholesterol levels.     Limit the amount of solid fat--butter, margarine, and shortening--you eat. Use olive, peanut, or canola oil when you cook. Bake, broil, and steam foods instead of frying them.     Eat a variety of fruit and vegetables every day. Dark green, deep orange, red, or yellow fruits and vegetables are especially good for you. Examples include spinach, carrots, peaches,

## 2025-05-28 NOTE — PROGRESS NOTES
murmur heard.     No friction rub. No gallop.   Pulmonary:      Effort: Pulmonary effort is normal. No respiratory distress.      Breath sounds: Normal breath sounds. No wheezing.   Abdominal:      General: Abdomen is flat. Bowel sounds are normal. There is no distension.      Palpations: Abdomen is soft. There is no mass.      Tenderness: There is no abdominal tenderness. There is no guarding.   Musculoskeletal:         General: No tenderness. Normal range of motion.      Cervical back: Normal range of motion.      Right lower leg: No edema.      Left lower leg: No edema.   Feet:      Right foot:      Protective Sensation: 10 sites tested.  10 sites sensed.      Left foot:      Protective Sensation: 10 sites tested.  10 sites sensed.   Lymphadenopathy:      Cervical: No cervical adenopathy.   Skin:     General: Skin is warm and dry.      Capillary Refill: Capillary refill takes less than 2 seconds.      Findings: No rash.   Neurological:      General: No focal deficit present.      Mental Status: He is alert.      Motor: Tremor present. No weakness, atrophy or abnormal muscle tone.   Psychiatric:         Mood and Affect: Mood normal.         Thought Content: Thought content normal.                 Allergies   Allergen Reactions    Codeine Nausea Only    Nsaids      Kidney issues     Prior to Visit Medications    Medication Sig Taking? Authorizing Provider   fluvoxaMINE (LUVOX) 100 MG tablet Take 1 tablet by mouth nightly Yes ProviderCydney MD   amoxicillin (AMOXIL) 500 MG capsule Take Amoxicillin 2 g (500mg x 4 tablets) by mouth 60 minutes prior to dental procedure. Yes Aicha Bolton MD   rosuvastatin (CRESTOR) 10 MG tablet Take 1 tablet by mouth daily Yes Aicha Bolton MD   pantoprazole (PROTONIX) 40 MG tablet Take 1 tablet by mouth daily Yes Aicha Bolton MD   losartan (COZAAR) 50 MG tablet Take 1 tablet by mouth daily Yes Aicha Bolton MD   levothyroxine (SYNTHROID) 150 MCG tablet Take 1 tablet by

## 2025-05-29 ENCOUNTER — RESULTS FOLLOW-UP (OUTPATIENT)
Dept: FAMILY MEDICINE CLINIC | Age: 70
End: 2025-05-29

## 2025-05-29 LAB
ANION GAP SERPL CALCULATED.3IONS-SCNC: 9 MMOL/L (ref 3–16)
BUN SERPL-MCNC: 24 MG/DL (ref 7–20)
CALCIUM SERPL-MCNC: 9.6 MG/DL (ref 8.3–10.6)
CHLORIDE SERPL-SCNC: 104 MMOL/L (ref 99–110)
CO2 SERPL-SCNC: 29 MMOL/L (ref 21–32)
CREAT SERPL-MCNC: 1.2 MG/DL (ref 0.8–1.3)
CREAT UR-MCNC: 149 MG/DL (ref 39–259)
DEPRECATED RDW RBC AUTO: 15.3 % (ref 12.4–15.4)
EST. AVERAGE GLUCOSE BLD GHB EST-MCNC: 119.8 MG/DL
GFR SERPLBLD CREATININE-BSD FMLA CKD-EPI: 65 ML/MIN/{1.73_M2}
GLUCOSE SERPL-MCNC: 120 MG/DL (ref 70–99)
HBA1C MFR BLD: 5.8 %
HCT VFR BLD AUTO: 39.9 % (ref 40.5–52.5)
HGB BLD-MCNC: 13.4 G/DL (ref 13.5–17.5)
MCH RBC QN AUTO: 28.5 PG (ref 26–34)
MCHC RBC AUTO-ENTMCNC: 33.7 G/DL (ref 31–36)
MCV RBC AUTO: 84.5 FL (ref 80–100)
MICROALBUMIN UR DL<=1MG/L-MCNC: 1.63 MG/DL
MICROALBUMIN/CREAT UR: 10.9 MG/G (ref 0–30)
PLATELET # BLD AUTO: 170 K/UL (ref 135–450)
PMV BLD AUTO: 10.1 FL (ref 5–10.5)
POTASSIUM SERPL-SCNC: 4.6 MMOL/L (ref 3.5–5.1)
PSA SERPL DL<=0.01 NG/ML-MCNC: 2.33 NG/ML (ref 0–4)
RBC # BLD AUTO: 4.72 M/UL (ref 4.2–5.9)
SODIUM SERPL-SCNC: 142 MMOL/L (ref 136–145)
TSH SERPL DL<=0.005 MIU/L-ACNC: 1.95 UIU/ML (ref 0.27–4.2)
WBC # BLD AUTO: 6.3 K/UL (ref 4–11)

## 2025-06-06 DIAGNOSIS — E78.2 MIXED HYPERLIPIDEMIA: ICD-10-CM

## 2025-06-06 RX ORDER — ROSUVASTATIN CALCIUM 10 MG/1
10 TABLET, COATED ORAL DAILY
Qty: 90 TABLET | Refills: 0 | Status: SHIPPED | OUTPATIENT
Start: 2025-06-06

## 2025-06-06 NOTE — TELEPHONE ENCOUNTER
Future Appointments   Date Time Provider Department Center   6/16/2025 10:15 AM Aneudy Lockett MD [facility] Neurology -   12/1/2025  1:00 PM Aicha Bolton MD [place] DEP     LOV 5/28/2025

## 2025-06-16 ENCOUNTER — OFFICE VISIT (OUTPATIENT)
Dept: NEUROLOGY | Age: 70
End: 2025-06-16
Payer: MEDICARE

## 2025-06-16 VITALS
OXYGEN SATURATION: 95 % | HEIGHT: 64 IN | SYSTOLIC BLOOD PRESSURE: 115 MMHG | WEIGHT: 194 LBS | HEART RATE: 105 BPM | DIASTOLIC BLOOD PRESSURE: 37 MMHG | BODY MASS INDEX: 33.12 KG/M2

## 2025-06-16 DIAGNOSIS — G20.C PRIMARY PARKINSONISM (HCC): Primary | ICD-10-CM

## 2025-06-16 PROCEDURE — 3078F DIAST BP <80 MM HG: CPT | Performed by: STUDENT IN AN ORGANIZED HEALTH CARE EDUCATION/TRAINING PROGRAM

## 2025-06-16 PROCEDURE — 3074F SYST BP LT 130 MM HG: CPT | Performed by: STUDENT IN AN ORGANIZED HEALTH CARE EDUCATION/TRAINING PROGRAM

## 2025-06-16 PROCEDURE — 1036F TOBACCO NON-USER: CPT | Performed by: STUDENT IN AN ORGANIZED HEALTH CARE EDUCATION/TRAINING PROGRAM

## 2025-06-16 PROCEDURE — G8417 CALC BMI ABV UP PARAM F/U: HCPCS | Performed by: STUDENT IN AN ORGANIZED HEALTH CARE EDUCATION/TRAINING PROGRAM

## 2025-06-16 PROCEDURE — 1159F MED LIST DOCD IN RCRD: CPT | Performed by: STUDENT IN AN ORGANIZED HEALTH CARE EDUCATION/TRAINING PROGRAM

## 2025-06-16 PROCEDURE — 1123F ACP DISCUSS/DSCN MKR DOCD: CPT | Performed by: STUDENT IN AN ORGANIZED HEALTH CARE EDUCATION/TRAINING PROGRAM

## 2025-06-16 PROCEDURE — 99203 OFFICE O/P NEW LOW 30 MIN: CPT | Performed by: STUDENT IN AN ORGANIZED HEALTH CARE EDUCATION/TRAINING PROGRAM

## 2025-06-16 PROCEDURE — G8427 DOCREV CUR MEDS BY ELIG CLIN: HCPCS | Performed by: STUDENT IN AN ORGANIZED HEALTH CARE EDUCATION/TRAINING PROGRAM

## 2025-06-16 PROCEDURE — 3017F COLORECTAL CA SCREEN DOC REV: CPT | Performed by: STUDENT IN AN ORGANIZED HEALTH CARE EDUCATION/TRAINING PROGRAM

## 2025-06-16 RX ORDER — CARBIDOPA AND LEVODOPA 25; 100 MG/1; MG/1
TABLET ORAL
Qty: 90 TABLET | Refills: 11 | Status: SHIPPED | OUTPATIENT
Start: 2025-06-16 | End: 2026-07-02

## 2025-06-16 RX ORDER — CARBIDOPA AND LEVODOPA 25; 100 MG/1; MG/1
TABLET ORAL
Qty: 180 TABLET | Refills: 11 | Status: SHIPPED | OUTPATIENT
Start: 2025-06-16 | End: 2025-06-16

## 2025-06-16 NOTE — PATIENT INSTRUCTIONS
These are very useful online resources for Parkinson's disease:   <https://www.parkinson.org/>  <https://www.apdaparkinson.org/>    At these websites you can learn more about treatment options, common symptoms, tools used to track symptoms, patient support groups, and ongoing research involving treatment of Parkinson's disease.     This book is a comprehensive guide to understanding and managing your condition. I highly recommend this book:  Life With Parkinson's : 50 Facts & More - The PD  by [employer]    You can find this book online at this website:   <https://Yohobuyfacts.Senseg/>

## 2025-06-16 NOTE — PROGRESS NOTES
History of Present Illness  New patient visit. Unaccompanied.     He reports experiencing hand tremors, particularly noticeable during activities such as eating or holding a piece of paper. The onset of these tremors is uncertain, but he believes they have been present for over 3 years. He is right-handed and has no family history of tremors, although Alzheimer's disease is prevalent in his family. He also mentions occasional memory lapses, which he attributes to his age. He describes a constant feeling of rigidity, likening it to being a tight rubber band. He maintains an active lifestyle, walking daily.    He has a history of psychological counseling spanning 38 years, with his current psychiatrist being the third in his lifetime. His medication regimen includes rosuvastatin for cholesterol management and BuSpar, the dosage of which was recently increased by his psychiatrist. He has a history of taking Seroquel (quetiapine) until 2010, which he discontinued due to weight gain and elevated A1c levels. He does not recall taking ziprasidone (Geodon), which was removed from his list in 2013.    He is unsure about his sense of smell and reports no issues with constipation. He experiences vivid dreams but does not act them out or engage in sleep fighting or yelling. He often wakes up feeling fatigued. He has a history of hearing impairment, with 85-90% loss in his right ear, and has undergone four ear surgeries. He has tried hearing aids but found them uncomfortable due to their loudness. He has a history of PTSD, OCD, and bipolar disorder, and experiences frequent flashbacks.    PAST SURGICAL HISTORY:  He has undergone two hip surgeries and takes amoxicillin prophylactically during dental cleanings to prevent infections.    FAMILY HISTORY  - Mother: Alzheimer's disease, exhibited symptoms such as writing in circles.   - Negative for tremors in the family.    BP (!) 115/37 (BP Site: Left Upper Arm)   Pulse (!) 105

## 2025-06-20 ENCOUNTER — HOSPITAL ENCOUNTER (OUTPATIENT)
Dept: PHYSICAL THERAPY | Age: 70
Setting detail: THERAPIES SERIES
Discharge: HOME | End: 2025-06-20
Attending: STUDENT IN AN ORGANIZED HEALTH CARE EDUCATION/TRAINING PROGRAM
Payer: MEDICARE

## 2025-06-20 ENCOUNTER — HOSPITAL ENCOUNTER (OUTPATIENT)
Dept: MRI IMAGING | Age: 70
Discharge: HOME | End: 2025-06-20
Attending: STUDENT IN AN ORGANIZED HEALTH CARE EDUCATION/TRAINING PROGRAM
Payer: MEDICARE

## 2025-06-20 DIAGNOSIS — G20.C PRIMARY PARKINSONISM (HCC): ICD-10-CM

## 2025-06-20 PROCEDURE — 97161 PT EVAL LOW COMPLEX 20 MIN: CPT

## 2025-06-20 PROCEDURE — 70551 MRI BRAIN STEM W/O DYE: CPT

## 2025-06-23 ENCOUNTER — TELEPHONE (OUTPATIENT)
Dept: NEUROLOGY | Age: 70
End: 2025-06-23

## 2025-06-23 NOTE — TELEPHONE ENCOUNTER
Pt also called and said his blood pressure is going up with taking the carbadope levadopa. Pt is only taking 3-1/2 tablets a day. Pt is due to start taking a whole pill 3 times a day starting WED. Please advise.

## 2025-06-23 NOTE — TELEPHONE ENCOUNTER
Patient called back and states that his BP in going up while taking the carbidopa-levodopa.     Pt is only taking 3-1/2 tablets a day. Pt is due to start taking a whole pill 3 times a day starting Wednesday.    Patient also wants Mri results    Please review and advise

## 2025-06-24 ENCOUNTER — TELEPHONE (OUTPATIENT)
Dept: NEUROLOGY | Age: 70
End: 2025-06-24

## 2025-06-24 ENCOUNTER — APPOINTMENT (OUTPATIENT)
Dept: PHYSICAL THERAPY | Age: 70
End: 2025-06-24
Attending: STUDENT IN AN ORGANIZED HEALTH CARE EDUCATION/TRAINING PROGRAM
Payer: MEDICARE

## 2025-06-24 NOTE — TELEPHONE ENCOUNTER
High blood pressure is not an expected side effect from Sinemet.  Brain MRI revealed evidence of reduced brain volume (otherwise known cerebral atrophy) which is most likely due to Parkinson's disease.  There were no other significant abnormalities on the brain MRI and nothing that would  or prognosis.    Recommendations:    Follow-up with PCP to discuss hypertension management  No change to Sinemet treatment plan discussed at last office visit

## 2025-06-24 NOTE — TELEPHONE ENCOUNTER
Pt returned call.  I discussed results with him and advised him to contact PCP with BP issues.  He said when he checked his BP last night, it had returned to normal.  Attributes it to being up and moving around a lot yesterday.

## 2025-06-26 ENCOUNTER — APPOINTMENT (OUTPATIENT)
Dept: PHYSICAL THERAPY | Age: 70
End: 2025-06-26
Attending: STUDENT IN AN ORGANIZED HEALTH CARE EDUCATION/TRAINING PROGRAM
Payer: MEDICARE

## 2025-07-02 ENCOUNTER — PATIENT MESSAGE (OUTPATIENT)
Dept: NEUROLOGY | Age: 70
End: 2025-07-02

## 2025-07-02 ENCOUNTER — TELEPHONE (OUTPATIENT)
Dept: NEUROLOGY | Age: 70
End: 2025-07-02

## 2025-07-02 DIAGNOSIS — G20.C PRIMARY PARKINSONISM (HCC): Primary | ICD-10-CM

## 2025-07-02 RX ORDER — CARBIDOPA AND LEVODOPA 25; 100 MG/1; MG/1
1 TABLET ORAL 3 TIMES DAILY
Qty: 90 TABLET | Refills: 11 | Status: SHIPPED | OUTPATIENT
Start: 2025-07-02 | End: 2026-06-27

## 2025-07-02 NOTE — TELEPHONE ENCOUNTER
LOV 6/16/25  NOV 9/29/25    Pt called with questions about the sinemet Rx that Dr. Lockett wrote on 6/16/25.       Initially the Rx was sent on 6/16/25 @ 10:46 a.m. with the following sig (THIS IS WHAT WAS DISPENSED TO PT FROM PHARMACY):     carbidopa-levodopa (SINEMET)  MG per tablet (Discontinued) 180 tablet 11 6/16/2025 6/16/2025     Sig - Route: Take 0.5 tablets by mouth 3 times daily for 7 days, THEN 1 tablet 3 times daily for 7 days, THEN 1.5 tablets 3 times daily for 7 days, THEN 2 tablets 3 times daily. - Oral     Sent to pharmacy as: Carbidopa-Levodopa  MG Oral Tablet (Sinemet)     E-Prescribing Status: Receipt confirmed by pharmacy (6/16/2025 10:46 AM EDT)        That Rx was d/c'd & Rx was resent to [facility] pharmacy on 6/16/25 at 10:53 a.m. with the following sig (THIS RX WAS NOT DISPENSED TO PT):    carbidopa-levodopa (SINEMET)  MG per tablet [[id number]]    Order Details       Dose, Route, Frequency: As Directed   Dispense Quantity: 90 tablet Refills: 11             Sig: Take 0.5 tablets by mouth daily for 7 days, THEN 0.5 tablets in the morning and at bedtime for 7 days, THEN 0.5 tablets 3 times daily for 7 days, THEN 1 tablet 3 times daily. Dose timing: upon waking, 30 minutes before lunch, 30 minutes before dinner.             Please clarify what instructions are correct.  I will call pharmacy once directions are confirmed

## 2025-07-02 NOTE — TELEPHONE ENCOUNTER
Pt called with questions about the sinemet Rx that Dr. Lockett wrote on 6/16/25.      Initially the Rx was sent on 6/16/25 @ 10:46 a.m. with the following sig (THIS IS WHAT WAS DISPENSED TO PT FROM PHARMACY):    carbidopa-levodopa (SINEMET)  MG per tablet (Discontinued) 180 tablet 11 6/16/2025 6/16/2025    Sig - Route: Take 0.5 tablets by mouth 3 times daily for 7 days, THEN 1 tablet 3 times daily for 7 days, THEN 1.5 tablets 3 times daily for 7 days, THEN 2 tablets 3 times daily. - Oral    Sent to pharmacy as: Carbidopa-Levodopa  MG Oral Tablet (Sinemet)    E-Prescribing Status: Receipt confirmed by pharmacy (6/16/2025 10:46 AM EDT)      That Rx was d/c'd & Rx was resent to [facility] pharmacy on 6/16/25 at 10:53 a.m. with the following sig (THIS RX WAS NOT DISPENSED TO PT):    carbidopa-levodopa (SINEMET)  MG per tablet [[id number]]    Order Details  Dose, Route, Frequency: As Directed   Dispense Quantity: 90 tablet Refills: 11         Sig: Take 0.5 tablets by mouth daily for 7 days, THEN 0.5 tablets in the morning and at bedtime for 7 days, THEN 0.5 tablets 3 times daily for 7 days, THEN 1 tablet 3 times daily. Dose timing: upon waking, 30 minutes before lunch, 30 minutes before dinner.       Please clarify which instructions are correct and contact pharmacy and pt to let them know which dosing instructions are correct.

## 2025-07-02 NOTE — TELEPHONE ENCOUNTER
Patient updated that new prescription was sent in and he is to take 1 tablet TID    He voiced understanding

## 2025-07-07 ENCOUNTER — APPOINTMENT (OUTPATIENT)
Dept: PHYSICAL THERAPY | Age: 70
End: 2025-07-07
Attending: STUDENT IN AN ORGANIZED HEALTH CARE EDUCATION/TRAINING PROGRAM
Payer: MEDICARE

## 2025-07-07 ENCOUNTER — HOSPITAL ENCOUNTER (OUTPATIENT)
Dept: OCCUPATIONAL THERAPY | Age: 70
Setting detail: THERAPIES SERIES
Discharge: HOME | End: 2025-07-07
Attending: STUDENT IN AN ORGANIZED HEALTH CARE EDUCATION/TRAINING PROGRAM
Payer: MEDICARE

## 2025-07-07 PROCEDURE — 97530 THERAPEUTIC ACTIVITIES: CPT

## 2025-07-07 PROCEDURE — 97165 OT EVAL LOW COMPLEX 30 MIN: CPT

## 2025-07-07 NOTE — PLAN OF CARE
[facility] - Outpatient Rehabilitation and Therapy: [address] [place] [address] office: [phone] fax: [phone]     Occupational Therapy Certification      Dear Aneudy Lockett MD,    We had the pleasure of evaluating the following patient for occupational therapy services at our [facility].  A summary of our findings can be found in the initial assessment below.  This includes our plan of care.  If you have any questions or concerns regarding these findings, please do not hesitate to contact me at the office phone number listed above.  Thank you for the referral.     Physician Signature:_______________________________Date:__________________  By signing above (or electronic signature), therapist’s plan is approved by physician       Initial Evaluation   Patient: Aneudy Shields (70 y.o. male)   Examination Date: 2025   :  1955 MRN: [id number]   Visit #: 1   Insurance Allowable: BMN  Auth Needed: No   Insurance: Payor: MEDICARE / Plan: MEDICARE PART A AND B / Product Type: *No Product type* /   Insurance ID: [id number] - (Medicare)  Secondary Insurance (if applicable): [place] BCBS   Treatment Diagnosis:  M62.81 (ICD-10-CM) - Muscle weakness (generalized), R27.8 (ICD-10-CM) - Other lack of coordination, and R41.9 (ICD-10-CM) - Unspecified symptoms and signs involving cognitive functions and awareness   Medical Diagnosis:  Primary parkinsonism (HCC) [G20.C]   Referring Provider: Aneudy Lockett MD  PCP: Aicha Bloton MD       Plan of care signed: No    Date of Patient follow up with Physician: TBD    POC update note: EVAL today  POC update due: (OR 10 visits /OR AUTH LIMITS, whichever is less) - 25  Recertification due: 25                                                          Precautions/ Contra-indications:   Latex allergy:   No  Pacemaker:     No  Other: TBD    Red Flags:  None    Suicide Screening:   The patient did not verbalize a

## 2025-07-10 ENCOUNTER — HOSPITAL ENCOUNTER (OUTPATIENT)
Dept: OCCUPATIONAL THERAPY | Age: 70
Setting detail: THERAPIES SERIES
Discharge: HOME | End: 2025-07-10
Attending: STUDENT IN AN ORGANIZED HEALTH CARE EDUCATION/TRAINING PROGRAM
Payer: MEDICARE

## 2025-07-10 ENCOUNTER — APPOINTMENT (OUTPATIENT)
Dept: PHYSICAL THERAPY | Age: 70
End: 2025-07-10
Attending: STUDENT IN AN ORGANIZED HEALTH CARE EDUCATION/TRAINING PROGRAM
Payer: MEDICARE

## 2025-07-10 PROCEDURE — 97110 THERAPEUTIC EXERCISES: CPT

## 2025-07-10 NOTE — FLOWSHEET NOTE
[facility] - Outpatient Rehabilitation and Therapy: [address] [place] [address] office: [phone] fax: [phone]      Daily Treatment Note/Progress Note   Patient: Aneudy Shields (70 y.o. male)   Examination Date: 07/10/2025   :  1955 MRN: [id number]   Visit #: 2   Insurance Allowable: BMN  Auth Needed: No   Insurance: Payor: MEDICARE / Plan: MEDICARE PART A AND B / Product Type: *No Product type* /   Insurance ID: [id number] - (Medicare)  Secondary Insurance (if applicable): [place] BCBS   Treatment Diagnosis:  M62.81 (ICD-10-CM) - Muscle weakness (generalized), R27.8 (ICD-10-CM) - Other lack of coordination, and R41.9 (ICD-10-CM) - Unspecified symptoms and signs involving cognitive functions and awareness   Medical Diagnosis:  Primary parkinsonism (HCC) [G20.C]   Referring Provider: Aneudy Lockett MD  PCP: Aicha Bolton MD       Plan of care signed: Yes, Dr. Lockett 25    Date of Patient follow up with Physician: TBD    POC update note: NO  POC update due: (OR 10 visits /OR AUTH LIMITS, whichever is less) - 25  Recertification due: 25                                                          Precautions/ Contra-indications:   Latex allergy:   No  Pacemaker:     No  Other: TBD    Red Flags:  None    Suicide Screening:   The patient did not verbalize a primary behavioral concern, suicidal ideation, suicidal intent, or demonstrate suicidal behaviors.    Preferred Language for Healthcare: English    Review Of Systems (ROS):  [x] Performed Review of systems (Integumentary, CardioPulmonary, Neurological) by intake and observation. Intake form has been scanned into medical record. Patient has been instructed to contact their primary care physician regarding ROS issues if not already being addressed at this time.    [x] Patient history, allergies, meds reviewed. Medical chart reviewed. See intake form.     Medical History:  Comorbidities:  Diabetes (Type I or

## 2025-07-14 ENCOUNTER — TELEPHONE (OUTPATIENT)
Dept: FAMILY MEDICINE CLINIC | Age: 70
End: 2025-07-14

## 2025-07-14 ENCOUNTER — APPOINTMENT (OUTPATIENT)
Dept: PHYSICAL THERAPY | Age: 70
End: 2025-07-14
Attending: STUDENT IN AN ORGANIZED HEALTH CARE EDUCATION/TRAINING PROGRAM
Payer: MEDICARE

## 2025-07-14 NOTE — TELEPHONE ENCOUNTER
Lalita jiménez from [facility] stating that patient had PHQ9 form done with patient and the score was 9. Low Suicide Risk assessment

## 2025-07-16 ENCOUNTER — HOSPITAL ENCOUNTER (OUTPATIENT)
Dept: OCCUPATIONAL THERAPY | Age: 70
Setting detail: THERAPIES SERIES
Discharge: HOME | End: 2025-07-16
Attending: STUDENT IN AN ORGANIZED HEALTH CARE EDUCATION/TRAINING PROGRAM
Payer: MEDICARE

## 2025-07-16 PROCEDURE — 97110 THERAPEUTIC EXERCISES: CPT

## 2025-07-16 NOTE — FLOWSHEET NOTE
[facility] - Outpatient Rehabilitation and Therapy: [address] [place] [address] office: [phone] fax: [phone]      Daily Treatment Note/Progress Note   Patient: Aneudy Shields (70 y.o. male)   Examination Date: 2025   :  1955 MRN: [id number]   Visit #: 3   Insurance Allowable: BMN  Auth Needed: No   Insurance: Payor: MEDICARE / Plan: MEDICARE PART A AND B / Product Type: *No Product type* /   Insurance ID: [id number] - (Medicare)  Secondary Insurance (if applicable): [place]   Treatment Diagnosis:  M62.81 (ICD-10-CM) - Muscle weakness (generalized), R27.8 (ICD-10-CM) - Other lack of coordination, and R41.9 (ICD-10-CM) - Unspecified symptoms and signs involving cognitive functions and awareness   Medical Diagnosis:  Primary parkinsonism (HCC) [G20.C]   Referring Provider: Aneudy Lockett MD  PCP: Aicha Bolton MD       Plan of care signed: Yes, Dr. Lockett 25    Date of Patient follow up with Physician: TBD    POC update note: NO  POC update due: (OR 10 visits /OR AUTH LIMITS, whichever is less) - 25  Recertification due: 25                                                          Precautions/ Contra-indications:   Latex allergy:   No  Pacemaker:     No  Other: TBD    Red Flags:  None    Suicide Screening:   The patient did not verbalize a primary behavioral concern, suicidal ideation, suicidal intent, or demonstrate suicidal behaviors.    Preferred Language for Healthcare: English    Review Of Systems (ROS):  [x] Performed Review of systems (Integumentary, CardioPulmonary, Neurological) by intake and observation. Intake form has been scanned into medical record. Patient has been instructed to contact their primary care physician regarding ROS issues if not already being addressed at this time.    [x] Patient history, allergies, meds reviewed. Medical chart reviewed. See intake form.     Medical History:  Comorbidities:  Diabetes (Type I or  POST-OP DIAGNOSIS:  Cervical spinal stenosis 16-Jun-2022 11:45:40  James Kyle N

## 2025-07-21 ENCOUNTER — HOSPITAL ENCOUNTER (OUTPATIENT)
Dept: OCCUPATIONAL THERAPY | Age: 70
Setting detail: THERAPIES SERIES
Discharge: HOME | End: 2025-07-21
Attending: STUDENT IN AN ORGANIZED HEALTH CARE EDUCATION/TRAINING PROGRAM
Payer: MEDICARE

## 2025-07-21 ENCOUNTER — APPOINTMENT (OUTPATIENT)
Dept: PHYSICAL THERAPY | Age: 70
End: 2025-07-21
Attending: STUDENT IN AN ORGANIZED HEALTH CARE EDUCATION/TRAINING PROGRAM
Payer: MEDICARE

## 2025-07-21 PROCEDURE — 97110 THERAPEUTIC EXERCISES: CPT

## 2025-07-24 ENCOUNTER — APPOINTMENT (OUTPATIENT)
Dept: PHYSICAL THERAPY | Age: 70
End: 2025-07-24
Attending: STUDENT IN AN ORGANIZED HEALTH CARE EDUCATION/TRAINING PROGRAM
Payer: MEDICARE

## 2025-07-24 ENCOUNTER — HOSPITAL ENCOUNTER (OUTPATIENT)
Dept: OCCUPATIONAL THERAPY | Age: 70
Setting detail: THERAPIES SERIES
Discharge: HOME | End: 2025-07-24
Attending: STUDENT IN AN ORGANIZED HEALTH CARE EDUCATION/TRAINING PROGRAM
Payer: MEDICARE

## 2025-07-24 PROCEDURE — 97110 THERAPEUTIC EXERCISES: CPT

## 2025-07-24 NOTE — FLOWSHEET NOTE
plan of care  [x] A total of 1-3 elements found upon examination of body systems using standardized tests and measures addressing any of the following: body structures, functions (impairments), activity limitations, and/or participation restrictions  [x] A clinical presentation with evolving clinical presentation w/ changing characteristics; min/mod assistance or modifications required to perform tasks. May have comorbidities that affect occupational performance  [x] Clinical decision making of LOW (99258 - Typically 20 minutes face-to-face) complexity using standardized patient assessment instrument and/or measurable assessment of functional outcome.    GOALS     Patient stated goal: \"Am I going to be okay?  I want to be independent.\"  Status:  [] Progressing: [] Met: [] Not Met: [] Adjusted    Therapist goals for Patient:   Short Term Goals: To be achieved in: 4 weeks  1. Pt will be assessed via score on UEFS for a subjective report of decreased difficulty with completing every day activities with BUE  [] Progressing: [x] Met: 7/16 [] Not Met: [] Adjusted  2. Pt will improve time on NHPT to more than 30 s to demonstrate improved fine motor coordination of BUE  [] Progressing: [x] Met: [] Not Met: [] Adjusted  3. Pt will demonstrate WFL score on BBT to demonstrate improved timing, speed and accuracy with functional grasp of right hand.  [x] Progressing: [] Met: [] Not Met: [] Adjusted  4. Pt will improve right hand  strength to facilitate increased independence with manipulating objects for increased participation and independence ADLs and IADLs.  [] Progressing: [] Met: [] Not Met: [] Adjusted  5. Pt will be Min A with HEP/Home activities program to facilitate independence with carryover from sessions and to progress towards returning to PLOF  [] Progressing: [x] Met: 7/16 [] Not Met: [] Adjusted    Long Term Goals: To be achieved in: 8 weeks  1. Pt will improve UEFS for a subjective report of decreased  No lymphadedenopathy

## 2025-07-25 ENCOUNTER — TELEPHONE (OUTPATIENT)
Dept: FAMILY MEDICINE CLINIC | Age: 70
End: 2025-07-25

## 2025-07-25 NOTE — TELEPHONE ENCOUNTER
Pt called to report his BP has been low for the last week or so. His neurologist put him on Sinemet back in June. Neurologist referred him back to his PCP    BP readings  113/40  123/47  110/49  101/56  126/49    Per pt he is not having any symptoms.

## 2025-07-25 NOTE — TELEPHONE ENCOUNTER
Please advise patient to hold amlodipine and call back with home blood pressures at the end of the week

## 2025-07-25 NOTE — TELEPHONE ENCOUNTER
Spoke with  she advised to have patient stop taking amlodipine 5 mg. I called patient and advised him to stop that medication monitor BP over the weekend and to call Monday or Tuesday with updates on his BP from the weekend.

## 2025-07-28 ENCOUNTER — APPOINTMENT (OUTPATIENT)
Dept: PHYSICAL THERAPY | Age: 70
End: 2025-07-28
Attending: STUDENT IN AN ORGANIZED HEALTH CARE EDUCATION/TRAINING PROGRAM
Payer: MEDICARE

## 2025-07-28 ENCOUNTER — HOSPITAL ENCOUNTER (OUTPATIENT)
Dept: OCCUPATIONAL THERAPY | Age: 70
Setting detail: THERAPIES SERIES
Discharge: HOME | End: 2025-07-28
Attending: STUDENT IN AN ORGANIZED HEALTH CARE EDUCATION/TRAINING PROGRAM
Payer: MEDICARE

## 2025-07-28 PROCEDURE — 97110 THERAPEUTIC EXERCISES: CPT

## 2025-07-28 NOTE — PLAN OF CARE
towards returning to PLOF  [x] Progressing: [] Met: [] Not Met: [] Adjusted    Overall Progression Towards Functional goals/ Treatment Progress Update:  Patient is progressing as expected towards functional goals listed.      CHARGE CAPTURE     CHARGE GRID   CPT Code (Timed) Minutes # CPT Code (Untimed) #      Therex (32990)  40 3   Eval:LOW (64108 - Typically 20 minutes face-to-face)      Ther. Act (64277)     Re-Eval (69330)      Sensory Integration (68171)     Estim Unattended (76264)      Self Care/Home Manage (82783)     Parraffin (68169)      Cognitive Function (25517)     Fluidotherapy (56849)      Cognitive Function (02925): each    additional 15 minutes     Dry Needle 1-2 muscle (81395)      Neuromusc. Re-ed (98794)     Dry Needle 3+ muscle (99144)      Manual (79430)     VASO (35712)      Aquatic Therex (92201)     Group Therapy (32581)      Iontophoresis (27583)         Ultrasound (54920)           Estim Attended (96045)           Other:     Other:             Total Timed Code Tx Minutes 40 3       Total Treatment Minutes 40     Charge Justification:  Therapeutic Exercise (96595): Provided verbal/tactile cueing for activities related to strengthening, flexibility, endurance, ROM. Includes review/progression of HEP activities related to strengthening, flexibility, endurance, AROM, proximal shoulder and UE for functional transfers/mobility, self-care, IADLs, and community re-entry     TREATMENT PLAN     Frequency/Duration: 1-2x/week for 8 weeks for the following treatment interventions:    Planned Treatment Interventions/Procedural Codes include:  Therapeutic exercise (23959): including strength training, ROM, endurance training and flexibility  Neuromuscular re-education (21344): including facilitation of normal movement patterns, promoting postural alignment and stability during static and dynamic movement (sitting or standing), visual perceptual training, proprioception training, balance retraining

## 2025-07-30 ENCOUNTER — HOSPITAL ENCOUNTER (OUTPATIENT)
Dept: OCCUPATIONAL THERAPY | Age: 70
Setting detail: THERAPIES SERIES
End: 2025-07-30
Attending: STUDENT IN AN ORGANIZED HEALTH CARE EDUCATION/TRAINING PROGRAM
Payer: MEDICARE

## 2025-07-30 ENCOUNTER — APPOINTMENT (OUTPATIENT)
Dept: PHYSICAL THERAPY | Age: 70
End: 2025-07-30
Attending: STUDENT IN AN ORGANIZED HEALTH CARE EDUCATION/TRAINING PROGRAM
Payer: MEDICARE

## 2025-08-27 DIAGNOSIS — E78.2 MIXED HYPERLIPIDEMIA: ICD-10-CM

## 2025-08-27 RX ORDER — ROSUVASTATIN CALCIUM 10 MG/1
TABLET, COATED ORAL
Qty: 90 TABLET | Refills: 1 | Status: SHIPPED | OUTPATIENT
Start: 2025-08-27

## 2025-09-01 DIAGNOSIS — Z79.2 NEED FOR ANTIBIOTIC PROPHYLAXIS FOR DENTAL PROCEDURE: ICD-10-CM

## 2025-09-02 RX ORDER — AMOXICILLIN 500 MG/1
CAPSULE ORAL
Qty: 4 CAPSULE | Refills: 0 | OUTPATIENT
Start: 2025-09-02

## 2025-09-02 RX ORDER — AMOXICILLIN 500 MG/1
CAPSULE ORAL
Qty: 16 CAPSULE | Refills: 0 | Status: SHIPPED | OUTPATIENT
Start: 2025-09-02

## 2025-09-03 ENCOUNTER — PATIENT MESSAGE (OUTPATIENT)
Dept: FAMILY MEDICINE CLINIC | Age: 70
End: 2025-09-03

## (undated) DEVICE — SOLUTION IV 1000ML 0.9% SOD CHL

## (undated) DEVICE — E-Z CLEAN, NON-STICK, PTFE COATED, ELECTROSURGICAL BLADE ELECTRODE, 2.5 INCH (6.35 CM): Brand: EZ CLEAN

## (undated) DEVICE — COAXIAL HIGH FLOW TIP WITH SOFT SHIELD

## (undated) DEVICE — STERILE PVP: Brand: MEDLINE INDUSTRIES, INC.

## (undated) DEVICE — MARKER SURG SKIN UTIL BLK REG TIP NONSMEARING W/ 6IN RUL

## (undated) DEVICE — GOWN,SIRUS,POLYRNF,BRTHSLV,XL,30/CS: Brand: MEDLINE

## (undated) DEVICE — APPLICATOR PREP 26ML 0.7% IOD POVACRYLEX 74% ISO ALC ST

## (undated) DEVICE — PEEL-AWAY HOOD: Brand: FLYTE, SURGICOOL

## (undated) DEVICE — GLOVE 6 LTX ST BIOGEL M PF BEAD CUFF

## (undated) DEVICE — INTENDED FOR TISSUE SEPARATION, AND OTHER PROCEDURES THAT REQUIRE A SHARP SURGICAL BLADE TO PUNCTURE OR CUT.: Brand: BARD-PARKER ® CARBON RIB-BACK BLADES

## (undated) DEVICE — SUTURE VCRL SZ 1 L18IN ABSRB UD L36MM CT-1 1/2 CIR J841D

## (undated) DEVICE — ORTHO PRE OP PACK: Brand: MEDLINE INDUSTRIES, INC.

## (undated) DEVICE — PLATE ES AD W 9FT CRD 2

## (undated) DEVICE — COTTON UNDERCAST PADDING,CRIMPED FINISH: Brand: WEBRIL

## (undated) DEVICE — TUBING, SUCTION, 1/4" X 12', STRAIGHT: Brand: MEDLINE

## (undated) DEVICE — STERILE POLYISOPRENE POWDER-FREE SURGICAL GLOVES WITH EMOLLIENT COATING: Brand: PROTEXIS

## (undated) DEVICE — PAD DRY FLOOR ABS 32X58IN GRN

## (undated) DEVICE — PROTECTOR ULN NRV PUR FOAM HK LOOP STRP ANATOMICALLY

## (undated) DEVICE — ELECTROSURGICAL PENCIL ROCKER SWITCH NON COATED BLADE ELECTRODE 10 FT (3 M) CORD HOLSTER: Brand: MEGADYNE

## (undated) DEVICE — DUAL CUT SAGITTAL BLADE

## (undated) DEVICE — DRAPE C ARM UNIV W41XL74IN CLR PLAS XR VELC CLSR POLY STRP

## (undated) DEVICE — HOLDER SCALP PLAS G STD

## (undated) DEVICE — SUTURE VCRL SZ 0 L18IN ABSRB UD L36MM CT-1 1/2 CIR J840D

## (undated) DEVICE — BANDAGE COBAN 4 IN COMPR W4INXL5YD FOAM COHESIVE QUIK STK SELF ADH SFT

## (undated) DEVICE — E-Z CLEAN, NON-STICK, PTFE COATED, ELECTROSURGICAL BLADE ELECTRODE, 4 INCH (10.2 CM): Brand: MEGADYNE

## (undated) DEVICE — SUTURE VCRL SZ 2-0 L18IN ABSRB UD CT-1 L36MM 1/2 CIR J839D

## (undated) DEVICE — 6619 2 PTNT ISO SYS INCISE AREA&LT;(&GT;&&LT;)&GT;P: Brand: STERI-DRAPE™ IOBAN™ 2

## (undated) DEVICE — DECANTER BAG 9": Brand: MEDLINE INDUSTRIES, INC.

## (undated) DEVICE — SURE SET-DOUBLE BASIN-LF: Brand: MEDLINE INDUSTRIES, INC.

## (undated) DEVICE — NEEDLE SPNL L3.5IN PNK HUB S STL REG WALL FIT STYL W/ QNCKE

## (undated) DEVICE — AGENT HEMSTAT 3GM OXIDIZED REGENERATED CELOS ABSRB FOR CONT (ORDER MULTIPLES OF 5EA)

## (undated) DEVICE — BLANKET WRM W29.9XL79.1IN UP BODY FORC AIR MISTRAL-AIR

## (undated) DEVICE — SUTURE MCRYL SZ 4-0 L27IN ABSRB UD L19MM PS-2 1/2 CIR PRIM Y426H

## (undated) DEVICE — Z INACTIVE NO SUPPLIER SOLUTIONIRRIG 3000ML 0.9% SOD CHL FLX CONT [79720808] [HOSPIRA WORLDWIDE INC]

## (undated) DEVICE — SOLUTION IV 250ML 0.9% SOD CHL PH 5 INJ USP VIAFLX PLAS

## (undated) DEVICE — STERILE SYNTHETIC POLYISOPRENE POWDER-FREE SURGICAL GLOVES WITH HYDROGEL COATING, SMOOTH FINISH, STRAIGHT FINGER: Brand: PROTEXIS

## (undated) DEVICE — YANKAUER,OPEN TIP,W/O VENT,STERILE: Brand: MEDLINE INDUSTRIES, INC.

## (undated) DEVICE — JEWISH HOSPITAL TURNOVER KIT: Brand: MEDLINE INDUSTRIES, INC.

## (undated) DEVICE — COVER LT HNDL BLU PLAS

## (undated) DEVICE — SYRINGE IRRIG 60ML SFT PLIABLE BLB EZ TO GRP 1 HND USE W/

## (undated) DEVICE — HANDPIECE SUCTION TUBING INTERPULSE 10FT

## (undated) DEVICE — COUNTER NDL 40 COUNT HLD 70 NUM FOAM BLK SGL MAG W BLDE REMV

## (undated) DEVICE — SOLUTION IRRIG 1000ML 09% SOD CHL USP PIC PLAS CONTAINER

## (undated) DEVICE — SPONGE,LAP,18"X18",DLX,XR,ST,5/PK,40/PK: Brand: MEDLINE

## (undated) DEVICE — CUFF RESTRN WRST OR ANK 45FT AD FOAM

## (undated) DEVICE — SYRINGE MED 30ML STD CLR PLAS LUERLOCK TIP N CTRL DISP

## (undated) DEVICE — SOLUTION IV IRRIG WATER 1000ML POUR BRL 2F7114

## (undated) DEVICE — DRESSING W4XL8IN ISLANDTHERABOND 3D

## (undated) DEVICE — SUTURE STRATAFIX SPRL SZ 1 L14IN ABSRB VLT L48CM CTX 1/2 SXPD2B405

## (undated) DEVICE — BIPOLAR SEALER 23-112-1 AQM 6.0: Brand: AQUAMANTYS ®

## (undated) DEVICE — UNDERGLOVE SURG SZ 8.5 FNGR THK0.21MIL GRN LTX BEAD CUF

## (undated) DEVICE — SYSTEM SKIN CLSR 22CM DERMBND PRINEO